# Patient Record
Sex: FEMALE | Race: WHITE | NOT HISPANIC OR LATINO | Employment: FULL TIME | URBAN - METROPOLITAN AREA
[De-identification: names, ages, dates, MRNs, and addresses within clinical notes are randomized per-mention and may not be internally consistent; named-entity substitution may affect disease eponyms.]

---
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
## 2022-03-21 ENCOUNTER — HOSPITAL ENCOUNTER (EMERGENCY)
Facility: HOSPITAL | Age: 22
Discharge: HOME | End: 2022-03-22
Attending: EMERGENCY MEDICINE
Payer: COMMERCIAL

## 2022-03-21 DIAGNOSIS — R25.1 TREMOR: ICD-10-CM

## 2022-03-21 DIAGNOSIS — R55 SYNCOPE, UNSPECIFIED SYNCOPE TYPE: Primary | ICD-10-CM

## 2022-03-21 DIAGNOSIS — R51.9 RIGHT-SIDED HEADACHE: ICD-10-CM

## 2022-03-21 LAB
ALBUMIN SERPL-MCNC: 4.4 G/DL (ref 3.4–5)
ALP SERPL-CCNC: 36 IU/L (ref 35–126)
ALT SERPL-CCNC: 24 IU/L (ref 11–54)
ANION GAP SERPL CALC-SCNC: 8 MEQ/L (ref 3–15)
AST SERPL-CCNC: 20 IU/L (ref 15–41)
B-HCG UR QL: NEGATIVE
BASOPHILS # BLD: 0.03 K/UL (ref 0.01–0.1)
BASOPHILS NFR BLD: 0.5 %
BILIRUB SERPL-MCNC: 0.3 MG/DL (ref 0.3–1.2)
BILIRUB UR QL STRIP.AUTO: NEGATIVE MG/DL
BUN SERPL-MCNC: 10 MG/DL (ref 8–20)
CALCIUM SERPL-MCNC: 9.3 MG/DL (ref 8.9–10.3)
CHLORIDE SERPL-SCNC: 107 MEQ/L (ref 98–109)
CLARITY UR REFRACT.AUTO: CLEAR
CO2 SERPL-SCNC: 23 MEQ/L (ref 22–32)
COLOR UR AUTO: COLORLESS
CREAT SERPL-MCNC: 0.7 MG/DL (ref 0.6–1.1)
DIFFERENTIAL METHOD BLD: NORMAL
EOSINOPHIL # BLD: 0.05 K/UL (ref 0.04–0.36)
EOSINOPHIL NFR BLD: 0.8 %
ERYTHROCYTE [DISTWIDTH] IN BLOOD BY AUTOMATED COUNT: 12.2 % (ref 11.7–14.4)
GFR SERPL CREATININE-BSD FRML MDRD: >60 ML/MIN/1.73M*2
GLUCOSE SERPL-MCNC: 91 MG/DL (ref 70–99)
GLUCOSE UR STRIP.AUTO-MCNC: NEGATIVE MG/DL
HCG UR QL: NEGATIVE
HCT VFR BLDCO AUTO: 42.8 % (ref 35–45)
HGB BLD-MCNC: 14.4 G/DL (ref 11.8–15.7)
HGB UR QL STRIP.AUTO: NEGATIVE
IMM GRANULOCYTES # BLD AUTO: 0.03 K/UL (ref 0–0.08)
IMM GRANULOCYTES NFR BLD AUTO: 0.5 %
KETONES UR STRIP.AUTO-MCNC: NEGATIVE MG/DL
LEUKOCYTE ESTERASE UR QL STRIP.AUTO: NEGATIVE
LYMPHOCYTES # BLD: 1.63 K/UL (ref 1.2–3.5)
LYMPHOCYTES NFR BLD: 24.9 %
MCH RBC QN AUTO: 31.2 PG (ref 28–33.2)
MCHC RBC AUTO-ENTMCNC: 33.6 G/DL (ref 32.2–35.5)
MCV RBC AUTO: 92.6 FL (ref 83–98)
MONOCYTES # BLD: 0.4 K/UL (ref 0.28–0.8)
MONOCYTES NFR BLD: 6.1 %
NEUTROPHILS # BLD: 4.41 K/UL (ref 1.7–7)
NEUTS SEG NFR BLD: 67.2 %
NITRITE UR QL STRIP.AUTO: NEGATIVE
NRBC BLD-RTO: 0 %
PDW BLD AUTO: 9.9 FL (ref 9.4–12.3)
PH UR STRIP.AUTO: 7 [PH]
PLATELET # BLD AUTO: 331 K/UL (ref 150–369)
POTASSIUM SERPL-SCNC: 4 MEQ/L (ref 3.6–5.1)
PROT SERPL-MCNC: 7.7 G/DL (ref 6–8.2)
PROT UR QL STRIP.AUTO: NEGATIVE
RBC # BLD AUTO: 4.62 M/UL (ref 3.93–5.22)
SODIUM SERPL-SCNC: 138 MEQ/L (ref 136–144)
SP GR UR REFRACT.AUTO: 1.01
UROBILINOGEN UR STRIP-ACNC: 0.2 EU/DL
WBC # BLD AUTO: 6.55 K/UL (ref 3.8–10.5)

## 2022-03-21 PROCEDURE — 99284 EMERGENCY DEPT VISIT MOD MDM: CPT | Mod: 25

## 2022-03-21 PROCEDURE — 3E023GC INTRODUCTION OF OTHER THERAPEUTIC SUBSTANCE INTO MUSCLE, PERCUTANEOUS APPROACH: ICD-10-PCS | Performed by: EMERGENCY MEDICINE

## 2022-03-21 PROCEDURE — 25800000 HC PHARMACY IV SOLUTIONS: Performed by: PHYSICIAN ASSISTANT

## 2022-03-21 PROCEDURE — 96375 TX/PRO/DX INJ NEW DRUG ADDON: CPT

## 2022-03-21 PROCEDURE — 63600000 HC DRUGS/DETAIL CODE: Performed by: PHYSICIAN ASSISTANT

## 2022-03-21 PROCEDURE — 96374 THER/PROPH/DIAG INJ IV PUSH: CPT

## 2022-03-21 PROCEDURE — 81003 URINALYSIS AUTO W/O SCOPE: CPT

## 2022-03-21 PROCEDURE — 36415 COLL VENOUS BLD VENIPUNCTURE: CPT

## 2022-03-21 PROCEDURE — 85025 COMPLETE CBC W/AUTO DIFF WBC: CPT | Performed by: EMERGENCY MEDICINE

## 2022-03-21 PROCEDURE — 84703 CHORIONIC GONADOTROPIN ASSAY: CPT | Performed by: EMERGENCY MEDICINE

## 2022-03-21 PROCEDURE — 3E0333Z INTRODUCTION OF ANTI-INFLAMMATORY INTO PERIPHERAL VEIN, PERCUTANEOUS APPROACH: ICD-10-PCS | Performed by: EMERGENCY MEDICINE

## 2022-03-21 PROCEDURE — 96372 THER/PROPH/DIAG INJ SC/IM: CPT | Mod: 59

## 2022-03-21 PROCEDURE — 81025 URINE PREGNANCY TEST: CPT

## 2022-03-21 PROCEDURE — 80053 COMPREHEN METABOLIC PANEL: CPT

## 2022-03-21 PROCEDURE — 3E033GC INTRODUCTION OF OTHER THERAPEUTIC SUBSTANCE INTO PERIPHERAL VEIN, PERCUTANEOUS APPROACH: ICD-10-PCS | Performed by: EMERGENCY MEDICINE

## 2022-03-21 PROCEDURE — 80053 COMPREHEN METABOLIC PANEL: CPT | Performed by: EMERGENCY MEDICINE

## 2022-03-21 PROCEDURE — 3E0337Z INTRODUCTION OF ELECTROLYTIC AND WATER BALANCE SUBSTANCE INTO PERIPHERAL VEIN, PERCUTANEOUS APPROACH: ICD-10-PCS | Performed by: EMERGENCY MEDICINE

## 2022-03-21 PROCEDURE — 93005 ELECTROCARDIOGRAM TRACING: CPT | Performed by: PHYSICIAN ASSISTANT

## 2022-03-21 PROCEDURE — 84703 CHORIONIC GONADOTROPIN ASSAY: CPT

## 2022-03-21 PROCEDURE — 81025 URINE PREGNANCY TEST: CPT | Performed by: EMERGENCY MEDICINE

## 2022-03-21 PROCEDURE — 81003 URINALYSIS AUTO W/O SCOPE: CPT | Performed by: EMERGENCY MEDICINE

## 2022-03-21 PROCEDURE — 85025 COMPLETE CBC W/AUTO DIFF WBC: CPT

## 2022-03-21 PROCEDURE — 96361 HYDRATE IV INFUSION ADD-ON: CPT

## 2022-03-21 RX ORDER — KETOROLAC TROMETHAMINE 15 MG/ML
15 INJECTION, SOLUTION INTRAMUSCULAR; INTRAVENOUS ONCE
Status: COMPLETED | OUTPATIENT
Start: 2022-03-21 | End: 2022-03-22

## 2022-03-21 RX ORDER — DIPHENHYDRAMINE HCL 50 MG/ML
25 VIAL (ML) INJECTION ONCE
Status: COMPLETED | OUTPATIENT
Start: 2022-03-21 | End: 2022-03-21

## 2022-03-21 RX ORDER — METOCLOPRAMIDE HYDROCHLORIDE 5 MG/ML
10 INJECTION INTRAMUSCULAR; INTRAVENOUS ONCE
Status: COMPLETED | OUTPATIENT
Start: 2022-03-21 | End: 2022-03-21

## 2022-03-21 RX ORDER — ATENOLOL 25 MG/1
25 TABLET ORAL DAILY
COMMUNITY
End: 2022-11-18

## 2022-03-21 RX ORDER — SUMATRIPTAN 6 MG/.5ML
6 INJECTION, SOLUTION SUBCUTANEOUS ONCE
Status: COMPLETED | OUTPATIENT
Start: 2022-03-21 | End: 2022-03-21

## 2022-03-21 RX ADMIN — SODIUM CHLORIDE 1000 ML: 9 INJECTION, SOLUTION INTRAVENOUS at 21:49

## 2022-03-21 RX ADMIN — SUMATRIPTAN 6 MG: 6 INJECTION, SOLUTION SUBCUTANEOUS at 21:55

## 2022-03-21 RX ADMIN — METOCLOPRAMIDE 10 MG: 5 INJECTION, SOLUTION INTRAMUSCULAR; INTRAVENOUS at 21:49

## 2022-03-21 RX ADMIN — DIPHENHYDRAMINE HYDROCHLORIDE 25 MG: 50 INJECTION, SOLUTION INTRAMUSCULAR; INTRAVENOUS at 21:53

## 2022-03-21 ASSESSMENT — ENCOUNTER SYMPTOMS
TREMORS: 1
DYSURIA: 0
COUGH: 0
ARTHRALGIAS: 0
EYE PAIN: 0
HEMATURIA: 0
DIZZINESS: 1
PALPITATIONS: 0
VOMITING: 0
ABDOMINAL PAIN: 0
NECK PAIN: 1
FEVER: 0
HEADACHES: 1
SEIZURES: 0
BACK PAIN: 0
SORE THROAT: 0
COLOR CHANGE: 0
SHORTNESS OF BREATH: 0
CHILLS: 0

## 2022-03-22 ENCOUNTER — TELEPHONE (OUTPATIENT)
Dept: FAMILY MEDICINE | Facility: CLINIC | Age: 22
End: 2022-03-22
Payer: COMMERCIAL

## 2022-03-22 VITALS
TEMPERATURE: 98.5 F | DIASTOLIC BLOOD PRESSURE: 75 MMHG | WEIGHT: 140 LBS | OXYGEN SATURATION: 99 % | HEART RATE: 82 BPM | SYSTOLIC BLOOD PRESSURE: 114 MMHG | HEIGHT: 64 IN | RESPIRATION RATE: 20 BRPM | BODY MASS INDEX: 23.9 KG/M2

## 2022-03-22 LAB
ATRIAL RATE: 78
P AXIS: 71
PR INTERVAL: 162
QRS DURATION: 76
QT INTERVAL: 374
QTC CALCULATION(BAZETT): 426
R AXIS: 86
T WAVE AXIS: 53
VENTRICULAR RATE: 78

## 2022-03-22 PROCEDURE — 63600000 HC DRUGS/DETAIL CODE: Performed by: PHYSICIAN ASSISTANT

## 2022-03-22 RX ADMIN — KETOROLAC TROMETHAMINE 15 MG: 15 INJECTION, SOLUTION INTRAMUSCULAR; INTRAVENOUS at 00:09

## 2022-03-22 NOTE — ED ATTESTATION NOTE
I reviewed and agree with physician assistant / nurse practitioner’s assessment and plan of care.  We discussed the treatment plan for the patient.  I was immediately available for any questions regarding the care of the patient.    We are in a pandemic with increased volumes and decreased capacity.      Héctor Mantilla MD  03/22/22 1958

## 2022-03-22 NOTE — DISCHARGE INSTRUCTIONS
Please call your primary care doctor, neurologist, and cardiologist today to inform them of your ER visit today and arrange a follow up appointment within 2 days. Please return immediately to the emergency department for any new/worsening or concerning symptoms.  We offered to complete a noncontrast CT of your head, but you declined at this time.  Please follow-up with your neurologist to discuss possibility of advanced imaging and to discuss your tremors.
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
## 2022-03-25 ENCOUNTER — HOSPITAL ENCOUNTER (EMERGENCY)
Facility: HOSPITAL | Age: 22
Discharge: HOME | End: 2022-03-25
Attending: EMERGENCY MEDICINE
Payer: COMMERCIAL

## 2022-03-25 ENCOUNTER — APPOINTMENT (EMERGENCY)
Dept: RADIOLOGY | Facility: HOSPITAL | Age: 22
End: 2022-03-25
Attending: EMERGENCY MEDICINE
Payer: COMMERCIAL

## 2022-03-25 ENCOUNTER — TELEPHONE (OUTPATIENT)
Dept: FAMILY MEDICINE | Facility: CLINIC | Age: 22
End: 2022-03-25

## 2022-03-25 VITALS
DIASTOLIC BLOOD PRESSURE: 84 MMHG | HEART RATE: 96 BPM | SYSTOLIC BLOOD PRESSURE: 116 MMHG | RESPIRATION RATE: 16 BRPM | OXYGEN SATURATION: 99 % | TEMPERATURE: 98 F

## 2022-03-25 DIAGNOSIS — N39.0 URINARY TRACT INFECTION IN FEMALE: Primary | ICD-10-CM

## 2022-03-25 DIAGNOSIS — R55 SYNCOPE, UNSPECIFIED SYNCOPE TYPE: ICD-10-CM

## 2022-03-25 DIAGNOSIS — R51.9 NONINTRACTABLE HEADACHE, UNSPECIFIED CHRONICITY PATTERN, UNSPECIFIED HEADACHE TYPE: ICD-10-CM

## 2022-03-25 DIAGNOSIS — Z86.69 HISTORY OF TREMOR: ICD-10-CM

## 2022-03-25 LAB
ALBUMIN SERPL-MCNC: 4.5 G/DL (ref 3.4–5)
ALP SERPL-CCNC: 42 IU/L (ref 35–126)
ALT SERPL-CCNC: 19 IU/L (ref 11–54)
ANION GAP SERPL CALC-SCNC: 11 MEQ/L (ref 3–15)
AST SERPL-CCNC: 18 IU/L (ref 15–41)
BACTERIA URNS QL MICRO: 2 /HPF
BASOPHILS # BLD: 0.03 K/UL (ref 0.01–0.1)
BASOPHILS NFR BLD: 0.4 %
BILIRUB SERPL-MCNC: 0.6 MG/DL (ref 0.3–1.2)
BILIRUB UR QL STRIP.AUTO: NEGATIVE MG/DL
BUN SERPL-MCNC: 18 MG/DL (ref 8–20)
CALCIUM SERPL-MCNC: 9.5 MG/DL (ref 8.9–10.3)
CHLORIDE SERPL-SCNC: 103 MEQ/L (ref 98–109)
CLARITY UR REFRACT.AUTO: ABNORMAL
CO2 SERPL-SCNC: 23 MEQ/L (ref 22–32)
COLOR UR AUTO: YELLOW
CREAT SERPL-MCNC: 1.1 MG/DL (ref 0.6–1.1)
DIFFERENTIAL METHOD BLD: ABNORMAL
EOSINOPHIL # BLD: 0.03 K/UL (ref 0.04–0.36)
EOSINOPHIL NFR BLD: 0.4 %
ERYTHROCYTE [DISTWIDTH] IN BLOOD BY AUTOMATED COUNT: 12.2 % (ref 11.7–14.4)
GFR SERPL CREATININE-BSD FRML MDRD: >60 ML/MIN/1.73M*2
GLUCOSE SERPL-MCNC: 66 MG/DL (ref 70–99)
GLUCOSE UR STRIP.AUTO-MCNC: NEGATIVE MG/DL
HCG UR QL: NEGATIVE
HCT VFR BLDCO AUTO: 44.6 % (ref 35–45)
HGB BLD-MCNC: 14.9 G/DL (ref 11.8–15.7)
HGB UR QL STRIP.AUTO: NEGATIVE
HYALINE CASTS #/AREA URNS LPF: ABNORMAL /LPF
IMM GRANULOCYTES # BLD AUTO: 0.02 K/UL (ref 0–0.08)
IMM GRANULOCYTES NFR BLD AUTO: 0.3 %
KETONES UR STRIP.AUTO-MCNC: NEGATIVE MG/DL
LEUKOCYTE ESTERASE UR QL STRIP.AUTO: 2
LIPASE SERPL-CCNC: 36 U/L (ref 20–51)
LYMPHOCYTES # BLD: 1.19 K/UL (ref 1.2–3.5)
LYMPHOCYTES NFR BLD: 17.8 %
MCH RBC QN AUTO: 31.1 PG (ref 28–33.2)
MCHC RBC AUTO-ENTMCNC: 33.4 G/DL (ref 32.2–35.5)
MCV RBC AUTO: 93.1 FL (ref 83–98)
MONOCYTES # BLD: 0.6 K/UL (ref 0.28–0.8)
MONOCYTES NFR BLD: 9 %
MUCOUS THREADS URNS QL MICRO: ABNORMAL /LPF
NEUTROPHILS # BLD: 4.83 K/UL (ref 1.7–7)
NEUTS SEG NFR BLD: 72.1 %
NITRITE UR QL STRIP.AUTO: NEGATIVE
NRBC BLD-RTO: 0 %
PDW BLD AUTO: 9.8 FL (ref 9.4–12.3)
PH UR STRIP.AUTO: 6.5 [PH]
PLATELET # BLD AUTO: 306 K/UL (ref 150–369)
POTASSIUM SERPL-SCNC: 4.1 MEQ/L (ref 3.6–5.1)
PROT SERPL-MCNC: 8.3 G/DL (ref 6–8.2)
PROT UR QL STRIP.AUTO: 2
RBC # BLD AUTO: 4.79 M/UL (ref 3.93–5.22)
RBC #/AREA URNS HPF: ABNORMAL /HPF
SODIUM SERPL-SCNC: 137 MEQ/L (ref 136–144)
SP GR UR REFRACT.AUTO: 1.01
SQUAMOUS URNS QL MICRO: 1 /HPF
UROBILINOGEN UR STRIP-ACNC: 0.2 EU/DL
WBC # BLD AUTO: 6.7 K/UL (ref 3.8–10.5)
WBC #/AREA URNS HPF: ABNORMAL /HPF

## 2022-03-25 PROCEDURE — 96374 THER/PROPH/DIAG INJ IV PUSH: CPT | Mod: 59

## 2022-03-25 PROCEDURE — 96375 TX/PRO/DX INJ NEW DRUG ADDON: CPT

## 2022-03-25 PROCEDURE — 87086 URINE CULTURE/COLONY COUNT: CPT | Performed by: PHYSICIAN ASSISTANT

## 2022-03-25 PROCEDURE — 83690 ASSAY OF LIPASE: CPT | Performed by: PHYSICIAN ASSISTANT

## 2022-03-25 PROCEDURE — 99284 EMERGENCY DEPT VISIT MOD MDM: CPT | Mod: 25

## 2022-03-25 PROCEDURE — 36415 COLL VENOUS BLD VENIPUNCTURE: CPT | Performed by: EMERGENCY MEDICINE

## 2022-03-25 PROCEDURE — 25000000 HC PHARMACY GENERAL

## 2022-03-25 PROCEDURE — 93005 ELECTROCARDIOGRAM TRACING: CPT | Performed by: EMERGENCY MEDICINE

## 2022-03-25 PROCEDURE — 80053 COMPREHEN METABOLIC PANEL: CPT | Performed by: EMERGENCY MEDICINE

## 2022-03-25 PROCEDURE — 84703 CHORIONIC GONADOTROPIN ASSAY: CPT | Performed by: EMERGENCY MEDICINE

## 2022-03-25 PROCEDURE — 63600000 HC DRUGS/DETAIL CODE: Performed by: PHYSICIAN ASSISTANT

## 2022-03-25 PROCEDURE — 96376 TX/PRO/DX INJ SAME DRUG ADON: CPT

## 2022-03-25 PROCEDURE — 3E0337Z INTRODUCTION OF ELECTROLYTIC AND WATER BALANCE SUBSTANCE INTO PERIPHERAL VEIN, PERCUTANEOUS APPROACH: ICD-10-PCS | Performed by: EMERGENCY MEDICINE

## 2022-03-25 PROCEDURE — 3E033GC INTRODUCTION OF OTHER THERAPEUTIC SUBSTANCE INTO PERIPHERAL VEIN, PERCUTANEOUS APPROACH: ICD-10-PCS | Performed by: EMERGENCY MEDICINE

## 2022-03-25 PROCEDURE — 93005 ELECTROCARDIOGRAM TRACING: CPT

## 2022-03-25 PROCEDURE — 63600105 HC IODINE BASED CONTRAST: Performed by: PHYSICIAN ASSISTANT

## 2022-03-25 PROCEDURE — 25800000 HC PHARMACY IV SOLUTIONS: Performed by: PHYSICIAN ASSISTANT

## 2022-03-25 PROCEDURE — 96361 HYDRATE IV INFUSION ADD-ON: CPT | Mod: 59

## 2022-03-25 PROCEDURE — 63700000 HC SELF-ADMINISTRABLE DRUG: Performed by: PHYSICIAN ASSISTANT

## 2022-03-25 PROCEDURE — 85025 COMPLETE CBC W/AUTO DIFF WBC: CPT | Performed by: EMERGENCY MEDICINE

## 2022-03-25 PROCEDURE — G1004 CDSM NDSC: HCPCS

## 2022-03-25 PROCEDURE — 81003 URINALYSIS AUTO W/O SCOPE: CPT | Performed by: PHYSICIAN ASSISTANT

## 2022-03-25 RX ORDER — DIPHENHYDRAMINE HCL 50 MG/ML
25 VIAL (ML) INJECTION ONCE
Status: COMPLETED | OUTPATIENT
Start: 2022-03-25 | End: 2022-03-25

## 2022-03-25 RX ORDER — CEFUROXIME AXETIL 500 MG/1
500 TABLET ORAL 2 TIMES DAILY
Qty: 14 TABLET | Refills: 0 | Status: SHIPPED | OUTPATIENT
Start: 2022-03-25 | End: 2022-04-01

## 2022-03-25 RX ORDER — DIPHENHYDRAMINE HCL 50 MG/ML
VIAL (ML) INJECTION
Status: DISCONTINUED
Start: 2022-03-25 | End: 2022-03-25 | Stop reason: HOSPADM

## 2022-03-25 RX ORDER — FAMOTIDINE 10 MG/ML
INJECTION INTRAVENOUS
Status: COMPLETED
Start: 2022-03-25 | End: 2022-03-25

## 2022-03-25 RX ORDER — METOCLOPRAMIDE HYDROCHLORIDE 5 MG/ML
10 INJECTION INTRAMUSCULAR; INTRAVENOUS ONCE
Status: COMPLETED | OUTPATIENT
Start: 2022-03-25 | End: 2022-03-25

## 2022-03-25 RX ORDER — CEFUROXIME AXETIL 250 MG/1
500 TABLET ORAL EVERY 12 HOURS
Status: DISCONTINUED | OUTPATIENT
Start: 2022-03-25 | End: 2022-03-25 | Stop reason: HOSPADM

## 2022-03-25 RX ORDER — FLUDROCORTISONE ACETATE 0.1 MG/1
TABLET ORAL
COMMUNITY
Start: 2022-03-04 | End: 2022-11-18

## 2022-03-25 RX ORDER — CEFUROXIME AXETIL 250 MG/1
TABLET ORAL
Status: DISCONTINUED
Start: 2022-03-25 | End: 2022-03-25 | Stop reason: HOSPADM

## 2022-03-25 RX ORDER — FAMOTIDINE 10 MG/ML
20 INJECTION INTRAVENOUS ONCE
Status: COMPLETED | OUTPATIENT
Start: 2022-03-25 | End: 2022-03-25

## 2022-03-25 RX ADMIN — DIPHENHYDRAMINE HYDROCHLORIDE 25 MG: 50 INJECTION INTRAMUSCULAR; INTRAVENOUS at 19:37

## 2022-03-25 RX ADMIN — METOCLOPRAMIDE HYDROCHLORIDE 10 MG: 5 INJECTION INTRAMUSCULAR; INTRAVENOUS at 16:28

## 2022-03-25 RX ADMIN — FAMOTIDINE 20 MG: 10 INJECTION INTRAVENOUS at 19:37

## 2022-03-25 RX ADMIN — DIPHENHYDRAMINE HYDROCHLORIDE 25 MG: 50 INJECTION INTRAMUSCULAR; INTRAVENOUS at 16:28

## 2022-03-25 RX ADMIN — IOHEXOL 120 ML: 350 INJECTION, SOLUTION INTRAVENOUS at 18:59

## 2022-03-25 RX ADMIN — SODIUM CHLORIDE 1000 ML: 9 INJECTION, SOLUTION INTRAVENOUS at 16:28

## 2022-03-25 RX ADMIN — CEFUROXIME AXETIL 500 MG: 250 TABLET, FILM COATED ORAL at 21:07

## 2022-03-25 NOTE — DISCHARGE INSTRUCTIONS
Your symptoms need further evaluation by your doctor and your neurologist.  Please call to arrange outpatient follow-up of you have worsening symptoms or concerns return to the ER for reevaluation
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
## 2022-03-26 LAB
ATRIAL RATE: 92
P AXIS: 45
PR INTERVAL: 140
QRS DURATION: 66
QT INTERVAL: 332
QTC CALCULATION(BAZETT): 410
R AXIS: 82
T WAVE AXIS: 42
VENTRICULAR RATE: 92

## 2022-03-26 ASSESSMENT — ENCOUNTER SYMPTOMS
CHILLS: 0
CHEST TIGHTNESS: 0
NUMBNESS: 0
HEADACHES: 1
WEAKNESS: 0
TREMORS: 1
SHORTNESS OF BREATH: 0
VOMITING: 0
NAUSEA: 0
FEVER: 0

## 2022-03-27 LAB
BACTERIA UR CULT: NORMAL
BACTERIA UR CULT: NORMAL

## 2022-03-29 ENCOUNTER — DOCUMENTATION (OUTPATIENT)
Dept: FAMILY MEDICINE | Facility: CLINIC | Age: 22
End: 2022-03-29
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
## 2022-11-03 ENCOUNTER — APPOINTMENT (EMERGENCY)
Dept: RADIOLOGY | Facility: HOSPITAL | Age: 22
End: 2022-11-03
Attending: STUDENT IN AN ORGANIZED HEALTH CARE EDUCATION/TRAINING PROGRAM
Payer: COMMERCIAL

## 2022-11-03 ENCOUNTER — HOSPITAL ENCOUNTER (EMERGENCY)
Facility: HOSPITAL | Age: 22
Discharge: HOME | End: 2022-11-03
Attending: EMERGENCY MEDICINE | Admitting: EMERGENCY MEDICINE
Payer: COMMERCIAL

## 2022-11-03 ENCOUNTER — OFFICE VISIT (OUTPATIENT)
Dept: FAMILY MEDICINE | Facility: CLINIC | Age: 22
End: 2022-11-03
Payer: COMMERCIAL

## 2022-11-03 VITALS
SYSTOLIC BLOOD PRESSURE: 118 MMHG | BODY MASS INDEX: 22.2 KG/M2 | WEIGHT: 130 LBS | RESPIRATION RATE: 14 BRPM | HEART RATE: 80 BPM | HEIGHT: 64 IN | TEMPERATURE: 98 F | OXYGEN SATURATION: 100 % | DIASTOLIC BLOOD PRESSURE: 63 MMHG

## 2022-11-03 VITALS — DIASTOLIC BLOOD PRESSURE: 70 MMHG | HEART RATE: 88 BPM | SYSTOLIC BLOOD PRESSURE: 90 MMHG | OXYGEN SATURATION: 98 %

## 2022-11-03 DIAGNOSIS — A18.01 POTT'S DISEASE: ICD-10-CM

## 2022-11-03 DIAGNOSIS — R55 SYNCOPE AND COLLAPSE: Primary | ICD-10-CM

## 2022-11-03 DIAGNOSIS — R10.9 ABDOMINAL PAIN, UNSPECIFIED ABDOMINAL LOCATION: ICD-10-CM

## 2022-11-03 DIAGNOSIS — R10.31 RIGHT LOWER QUADRANT ABDOMINAL PAIN: Primary | ICD-10-CM

## 2022-11-03 LAB
ALBUMIN SERPL-MCNC: 4 G/DL (ref 3.4–5)
ALP SERPL-CCNC: 36 IU/L (ref 35–126)
ALT SERPL-CCNC: 15 IU/L (ref 11–54)
ANION GAP SERPL CALC-SCNC: 7 MEQ/L (ref 3–15)
AST SERPL-CCNC: 17 IU/L (ref 15–41)
BASOPHILS # BLD: 0.03 K/UL (ref 0.01–0.1)
BASOPHILS NFR BLD: 0.5 %
BILIRUB SERPL-MCNC: 0.3 MG/DL (ref 0.3–1.2)
BILIRUB UR QL STRIP.AUTO: NEGATIVE MG/DL
BUN SERPL-MCNC: 14 MG/DL (ref 8–20)
CALCIUM SERPL-MCNC: 9.1 MG/DL (ref 8.9–10.3)
CHLORIDE SERPL-SCNC: 105 MEQ/L (ref 98–109)
CLARITY UR REFRACT.AUTO: CLEAR
CO2 SERPL-SCNC: 24 MEQ/L (ref 22–32)
COLOR UR AUTO: YELLOW
CREAT SERPL-MCNC: 0.7 MG/DL (ref 0.6–1.1)
DIFFERENTIAL METHOD BLD: ABNORMAL
EOSINOPHIL # BLD: 0.04 K/UL (ref 0.04–0.36)
EOSINOPHIL NFR BLD: 0.6 %
ERYTHROCYTE [DISTWIDTH] IN BLOOD BY AUTOMATED COUNT: 12.2 % (ref 11.7–14.4)
GFR SERPL CREATININE-BSD FRML MDRD: >60 ML/MIN/1.73M*2
GLUCOSE BLD-MCNC: 84 MG/DL (ref 70–99)
GLUCOSE SERPL-MCNC: 77 MG/DL (ref 70–99)
GLUCOSE UR STRIP.AUTO-MCNC: NEGATIVE MG/DL
HCG UR QL: NEGATIVE
HCT VFR BLDCO AUTO: 41.5 % (ref 35–45)
HGB BLD-MCNC: 13.3 G/DL (ref 11.8–15.7)
HGB UR QL STRIP.AUTO: NEGATIVE
IMM GRANULOCYTES # BLD AUTO: 0.04 K/UL (ref 0–0.08)
IMM GRANULOCYTES NFR BLD AUTO: 0.6 %
KETONES UR STRIP.AUTO-MCNC: NEGATIVE MG/DL
LACTATE SERPL-SCNC: 1 MMOL/L (ref 0.4–2)
LEUKOCYTE ESTERASE UR QL STRIP.AUTO: NEGATIVE
LYMPHOCYTES # BLD: 1.57 K/UL (ref 1.2–3.5)
LYMPHOCYTES NFR BLD: 23.8 %
MCH RBC QN AUTO: 30.9 PG (ref 28–33.2)
MCHC RBC AUTO-ENTMCNC: 32 G/DL (ref 32.2–35.5)
MCV RBC AUTO: 96.5 FL (ref 83–98)
MONOCYTES # BLD: 0.44 K/UL (ref 0.28–0.8)
MONOCYTES NFR BLD: 6.7 %
NEUTROPHILS # BLD: 4.49 K/UL (ref 1.7–7)
NEUTS SEG NFR BLD: 67.8 %
NITRITE UR QL STRIP.AUTO: NEGATIVE
NRBC BLD-RTO: 0 %
PDW BLD AUTO: 9.6 FL (ref 9.4–12.3)
PH UR STRIP.AUTO: 6.5 [PH]
PLATELET # BLD AUTO: 305 K/UL (ref 150–369)
POCT TEST: NORMAL
POTASSIUM SERPL-SCNC: 3.8 MEQ/L (ref 3.6–5.1)
PROT SERPL-MCNC: 7.6 G/DL (ref 6–8.2)
PROT UR QL STRIP.AUTO: NEGATIVE
RBC # BLD AUTO: 4.3 M/UL (ref 3.93–5.22)
SODIUM SERPL-SCNC: 136 MEQ/L (ref 136–144)
SP GR UR REFRACT.AUTO: 1.01
TROPONIN I SERPL HS-MCNC: <2 PG/ML
UROBILINOGEN UR STRIP-ACNC: 0.2 EU/DL
WBC # BLD AUTO: 6.61 K/UL (ref 3.8–10.5)

## 2022-11-03 PROCEDURE — 81003 URINALYSIS AUTO W/O SCOPE: CPT | Performed by: STUDENT IN AN ORGANIZED HEALTH CARE EDUCATION/TRAINING PROGRAM

## 2022-11-03 PROCEDURE — 93005 ELECTROCARDIOGRAM TRACING: CPT | Performed by: EMERGENCY MEDICINE

## 2022-11-03 PROCEDURE — 3E033NZ INTRODUCTION OF ANALGESICS, HYPNOTICS, SEDATIVES INTO PERIPHERAL VEIN, PERCUTANEOUS APPROACH: ICD-10-PCS | Performed by: EMERGENCY MEDICINE

## 2022-11-03 PROCEDURE — 76857 US EXAM PELVIC LIMITED: CPT | Mod: 59

## 2022-11-03 PROCEDURE — 83605 ASSAY OF LACTIC ACID: CPT

## 2022-11-03 PROCEDURE — 84484 ASSAY OF TROPONIN QUANT: CPT

## 2022-11-03 PROCEDURE — 84484 ASSAY OF TROPONIN QUANT: CPT | Performed by: EMERGENCY MEDICINE

## 2022-11-03 PROCEDURE — 83605 ASSAY OF LACTIC ACID: CPT | Performed by: EMERGENCY MEDICINE

## 2022-11-03 PROCEDURE — 84703 CHORIONIC GONADOTROPIN ASSAY: CPT | Performed by: EMERGENCY MEDICINE

## 2022-11-03 PROCEDURE — 93005 ELECTROCARDIOGRAM TRACING: CPT

## 2022-11-03 PROCEDURE — 99284 EMERGENCY DEPT VISIT MOD MDM: CPT | Mod: 25

## 2022-11-03 PROCEDURE — 76775 US EXAM ABDO BACK WALL LIM: CPT

## 2022-11-03 PROCEDURE — 76830 TRANSVAGINAL US NON-OB: CPT

## 2022-11-03 PROCEDURE — 63600000 HC DRUGS/DETAIL CODE: Performed by: EMERGENCY MEDICINE

## 2022-11-03 PROCEDURE — 85025 COMPLETE CBC W/AUTO DIFF WBC: CPT

## 2022-11-03 PROCEDURE — 84075 ASSAY ALKALINE PHOSPHATASE: CPT

## 2022-11-03 PROCEDURE — 85025 COMPLETE CBC W/AUTO DIFF WBC: CPT | Performed by: EMERGENCY MEDICINE

## 2022-11-03 PROCEDURE — 200200 PR NO CHARGE: Performed by: NURSE PRACTITIONER

## 2022-11-03 PROCEDURE — 84703 CHORIONIC GONADOTROPIN ASSAY: CPT

## 2022-11-03 PROCEDURE — 80053 COMPREHEN METABOLIC PANEL: CPT | Performed by: EMERGENCY MEDICINE

## 2022-11-03 PROCEDURE — 36415 COLL VENOUS BLD VENIPUNCTURE: CPT

## 2022-11-03 PROCEDURE — 96374 THER/PROPH/DIAG INJ IV PUSH: CPT

## 2022-11-03 RX ORDER — KETOROLAC TROMETHAMINE 15 MG/ML
15 INJECTION, SOLUTION INTRAMUSCULAR; INTRAVENOUS ONCE
Status: COMPLETED | OUTPATIENT
Start: 2022-11-03 | End: 2022-11-03

## 2022-11-03 RX ORDER — NAPROXEN 500 MG/1
500 TABLET ORAL 2 TIMES DAILY WITH MEALS
Qty: 60 TABLET | Refills: 0 | Status: SHIPPED | OUTPATIENT
Start: 2022-11-03 | End: 2023-10-05

## 2022-11-03 RX ADMIN — KETOROLAC TROMETHAMINE 15 MG: 15 INJECTION, SOLUTION INTRAMUSCULAR; INTRAVENOUS at 16:19

## 2022-11-03 ASSESSMENT — ENCOUNTER SYMPTOMS
FEVER: 0
ABDOMINAL PAIN: 1
ARTHRALGIAS: 0
PALPITATIONS: 0
SHORTNESS OF BREATH: 0
BACK PAIN: 0
SORE THROAT: 0
DYSURIA: 1
FREQUENCY: 1
COUGH: 0
VOMITING: 0
COLOR CHANGE: 0
CHILLS: 0
EYE PAIN: 0
HEMATURIA: 0
SEIZURES: 0
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
## 2022-11-03 NOTE — ED PROVIDER NOTES
Emergency Medicine Note  HPI   HISTORY OF PRESENT ILLNESS     22-year-old female, past medical history of appendectomy, POTS, anxiety, pseudoseizure who presents with abdominal pain.  Patient reports she has had 1 day of right lower quadrant abdominal pain.  Reports pain to be sharp and radiating to her back.  Patient does report she has a history of a ovarian cyst.  Orts that she went to the doctor to be evaluated today and while she was there she had a loss of consciousness.  No head strike or blood thinner use.  Witnessed by mother.  No seizure-like activity.,  Return to normal mental status fairly quickly.  Patient does report dysuria, frequency.  Patient denies vaginal bleeding.  Reports nausea without vomiting.  No chest pain, shortness of breath or additional symptoms.            Patient History   PAST HISTORY     Reviewed from Nursing Triage:       Past Medical History:   Diagnosis Date    Anxiety     Concussion     Hypoglycemia     POP (persistent occipitoposterior position)     POTS (postural orthostatic tachycardia syndrome)        Past Surgical History:   Procedure Laterality Date    APPENDECTOMY      SKIN BIOPSY         Family History   Problem Relation Age of Onset    Diabetes Biological Mother     Bipolar disorder Biological Father     JOSE RAUL disease Biological Father     No Known Problems Biological Sister         gastroparesis unknown     Kidney disease Maternal Grandmother     Hyperlipidemia Maternal Grandmother     Diabetes Maternal Grandfather     Heart disease Maternal Grandfather     Lupus Paternal Grandmother     Thyroid disease Paternal Grandmother     Hydrocephalus Paternal Grandmother     Bipolar disorder Paternal Grandmother     Atrial fibrillation Paternal Grandfather        Social History     Tobacco Use    Smoking status: Never    Smokeless tobacco: Never   Vaping Use    Vaping Use: Never used   Substance Use Topics    Alcohol use: Never    Drug use: Never          Review of Systems   REVIEW OF SYSTEMS     Review of Systems   Constitutional: Negative for chills and fever.   HENT: Negative for ear pain and sore throat.    Eyes: Negative for pain and visual disturbance.   Respiratory: Negative for cough and shortness of breath.    Cardiovascular: Negative for chest pain and palpitations.   Gastrointestinal: Positive for abdominal pain. Negative for vomiting.   Genitourinary: Positive for dysuria and frequency. Negative for hematuria.   Musculoskeletal: Negative for arthralgias and back pain.   Skin: Negative for color change and rash.   Neurological: Positive for syncope. Negative for seizures.   All other systems reviewed and are negative.        VITALS     ED Vitals    Date/Time Temp Pulse Resp BP SpO2 [facility]   11/03/22 1839 36.7 °C (98 °F) 80 14 118/63 100 % KLM   11/03/22 1520 -- 92 15 116/72 100 % KLM   11/03/22 1407 36.2 °C (97.2 °F) 99 17 115/75 100 % KLM                       Physical Exam   PHYSICAL EXAM     Physical Exam  Vitals and nursing note reviewed.   Constitutional:       General: She is not in acute distress.     Appearance: She is well-developed.   HENT:      Head: Normocephalic and atraumatic.      Right Ear: External ear normal.      Left Ear: External ear normal.      Mouth/Throat:      Pharynx: Oropharynx is clear.   Eyes:      Conjunctiva/sclera: Conjunctivae normal.   Cardiovascular:      Rate and Rhythm: Normal rate and regular rhythm.      Heart sounds: No murmur heard.  Pulmonary:      Effort: Pulmonary effort is normal. No respiratory distress.      Breath sounds: Normal breath sounds.   Abdominal:      Palpations: Abdomen is soft.      Tenderness: There is abdominal tenderness.      Comments: Tenderness to palpation in right pelvic area   Musculoskeletal:      Cervical back: Neck supple.      Right lower leg: No edema.      Left lower leg: No edema.   Skin:     General: Skin is warm and dry.   Neurological:      General: No focal deficit present.       Mental Status: She is alert and oriented to person, place, and time.      Cranial Nerves: No cranial nerve deficit.      Sensory: No sensory deficit.      Motor: No weakness.      Gait: Gait normal.   Psychiatric:         Mood and Affect: Mood normal.         Behavior: Behavior normal.         Thought Content: Thought content normal.         Judgment: Judgment normal.           PROCEDURES     Procedures     DATA     Results     Procedure Component Value Units Date/Time    Urinalysis with Reflex Culture [[id number]]  (Normal) Collected: 11/03/22 1533    Specimen: Urine, Clean Catch Updated: 11/03/22 1541    Narrative:      The following orders were created for panel order Urinalysis with Reflex Culture.  Procedure                               Abnormality         Status                     ---------                               -----------         ------                     UA Reflex to Culture (Ma...[[id number]]  Normal              Final result                 Please view results for these tests on the individual orders.    UA Reflex to Culture (Macroscopic) [[id number]]  (Normal) Collected: 11/03/22 1533    Specimen: Urine, Clean Catch Updated: 11/03/22 1541     Color, Urine Yellow     Clarity, Urine Clear     Specific Gravity, Urine 1.014     pH, Urine 6.5     Leukocyte Esterase Negative     Comment: Results can be falsely negative due to high specific gravity, some antibiotics, glucose >3 g/dl, or WBC other than neutrophils.        Nitrite, Urine Negative     Protein, Urine Negative     Glucose, Urine Negative mg/dL      Ketones, Urine Negative mg/dL      Urobilinogen, Urine 0.2 EU/dL      Bilirubin, Urine Negative mg/dL      Blood, Urine Negative     Comment: The sensitivity of the occult blood test is equivalent to approximately 4 intact RBC/HPF.       HS Troponin I (with 2 hour reflex) [[id number]]  (Normal) Collected: 11/03/22 1413    Specimen: Blood, Venous Updated: 11/03/22 1500     High Sens Troponin I <2.0  pg/mL     Comprehensive metabolic panel [[id number]]  (Normal) Collected: 11/03/22 1413    Specimen: Blood, Venous Updated: 11/03/22 1449     Sodium 136 mEQ/L      Potassium 3.8 mEQ/L      Comment: Results obtained on plasma. Plasma Potassium values may be up to 0.4 mEQ/L less than serum values. The differences may be greater for patients with high platelet or white cell counts.        Chloride 105 mEQ/L      CO2 24 mEQ/L      BUN 14 mg/dL      Creatinine 0.7 mg/dL      Glucose 77 mg/dL      Calcium 9.1 mg/dL      AST (SGOT) 17 IU/L      ALT (SGPT) 15 IU/L      Alkaline Phosphatase 36 IU/L      Total Protein 7.6 g/dL      Comment: Test performed on plasma which typically contains approximately 0.4 g/dL more protein than serum.        Albumin 4.0 g/dL      Bilirubin, Total 0.3 mg/dL      eGFR >60.0 mL/min/1.73m*2      Anion Gap 7 mEQ/L     hCG, serum, qualitative [[id number]]  (Normal) Collected: 11/03/22 1413    Specimen: Blood, Venous Updated: 11/03/22 1441     Preg Test, Serum Negative    CBC and differential [[id number]]  (Abnormal) Collected: 11/03/22 1413    Specimen: Blood, Venous Updated: 11/03/22 1433     WBC 6.61 K/uL      RBC 4.30 M/uL      Hemoglobin 13.3 g/dL      Hematocrit 41.5 %      MCV 96.5 fL      MCH 30.9 pg      MCHC 32.0 g/dL      RDW 12.2 %      Platelets 305 K/uL      MPV 9.6 fL      Differential Type Auto     nRBC 0.0 %      Immature Granulocytes 0.6 %      Neutrophils 67.8 %      Lymphocytes 23.8 %      Monocytes 6.7 %      Eosinophils 0.6 %      Basophils 0.5 %      Immature Granulocytes, Absolute 0.04 K/uL      Neutrophils, Absolute 4.49 K/uL      Lymphocytes, Absolute 1.57 K/uL      Monocytes, Absolute 0.44 K/uL      Eosinophils, Absolute 0.04 K/uL      Basophils, Absolute 0.03 K/uL     Lactate, w/ reflex repeat if > 2.0 [[id number]]  (Normal) Collected: 11/03/22 1413    Specimen: Blood, Venous Updated: 11/03/22 1425     Lactate 1.0 mmol/L     RAINBOW LT BLUE [[id number]] Collected: 11/03/22  1413    Specimen: Blood, Venous Updated: 11/03/22 1423    Reese Draw Panel [[id number]] Collected: 11/03/22 1413    Specimen: Blood, Venous Updated: 11/03/22 1423    Narrative:      The following orders were created for panel order Reese Draw Panel.  Procedure                               Abnormality         Status                     ---------                               -----------         ------                     RAINBOW RED[[id number]]                                      In process                 RAINBOW LT BLUE[[id number]]                                  In process                 RAINBOW GOLD[[id number]]                                                                  Please view results for these tests on the individual orders.    RAINBOW RED [[id number]] Collected: 11/03/22 1413    Specimen: Blood, Venous Updated: 11/03/22 1423          Imaging Results          ULTRASOUND KIDNEYS (Final result)  Result time 11/03/22 17:16:51    Final result                 Impression:    IMPRESSION: Unremarkable sonographic appearance of the kidneys.             Narrative:      CLINICAL HISTORY: c/f stone, please also eval with ureteral jets   . Right lower  quadrant pain radiating to right flank. Hematuria.    Comparison: None.    COMMENT: Ultrasound of the kidneys was performed.    The right kidney measures 10.7 cm.    The left kidney measures 11.3 cm.    There is no evidence of renal cysts, shadowing calculi, solid contour deforming  masses or hydronephrosis.    The renal parenchymal echogenicity is within normal limits.    No ureteral jets were demonstrated on survey imaging of the urinary bladder.  However, this may be technical in this patient without hydronephrosis.                               ULTRASOUND PELVIS LIMITED TRANSABDOMINAL ONLY (Final result)  Result time 11/03/22 17:14:37   Procedure changed from ULTRASOUND PELVIS COMPLETE TRANSABDOMINAL ONLY     Final result                 Impression:     IMPRESSION: Unremarkable pelvic ultrasound.               Narrative:      CLINICAL HISTORY: L pelvic pain, hx of ovarian cyst   .    Comparison: None.    COMMENT:    Transabdominal limited pelvic ultrasound and transvaginal examination of the  pelvis is performed. Transabdominal ultrasound was performed to allow a broad  examination of the pelvis to visualize structures that cannot be seen with  transvaginal imaging alone. Transvaginal scans were performed for better  delineation of the adnexa.    Uterus/Endometrium: The uterus measures 6.8 x 3.0 x 3.2 cm. Uterine parenchyma  appears homogeneous. No focal myometrial masses are identified. The endometrial  stripe measures 0.5 cm in maximum diameter.    Right Ovary: The right ovary measures 2.8 x 1.3 x 2.7 cm. The right ovary is  normal in echotexture with only tiny follicles.    The Left Ovary:  The left ovary measures 2.9 x 1.5 x 3.0 cm. The left ovary is  normal in echotexture with only tiny follicles.    Peritoneum:  There is no significant free fluid in the cul-de-sac.    Additional findings:  All of the pelvic structures were best demonstrated on  transvaginal imaging on this exam.                                ECG 12 lead             Scoring tools                                  ED Course & MDM   MDM / ED COURSE / CLINICAL IMPRESSION / DISPO     MDM  Number of Diagnoses or Management Options  Right lower quadrant abdominal pain  Diagnosis management comments: 22-year-old who presents with 1 day of right lower quadrant abdominal pain in the setting of known ovarian cyst complicated by syncopal type episode, likely related to POTS/vasovagal syncope while at doctor's office  Vital signs stable, patient overall well-appearing, in no acute distress, neuro examination within normal limits, patient does have tenderness to palpation in right lower quadrant/right pelvic area  Will obtain labs, pelvic ultrasound, renal ultrasound to evaluate for stones and  torsion  Pain controlled, patient has negative work-up, patient stable for discharge with PCP follow-up      ED Course as of 11/03/22 2053   Thu Nov 03, 2022 1732 IMPRESSION: Unremarkable sonographic appearance of the kidneys.   [MK]   1734 IMPRESSION: Unremarkable pelvic ultrasound. [MK]      ED Course User Index  [MK] Aleksandar Tyson MD     Clinical Impression      Right lower quadrant abdominal pain     _________________     ED Disposition   Discharge                   Aleksandar Tyson MD  Resident  11/03/22 2054

## 2022-11-03 NOTE — ED ATTESTATION NOTE
"Procedures  Physical Exam  Review of Systems    11/3/08481:51 PM  I have personally seen and examined the patient.  I reviewed and agree with the PA/NP/Resident's assessment and plan of care.    My examination, assessment, and plan of care of Kyaw Harrison is as follows:  The patient presents with pain in her right inguinal region radiating into her right back since last night.  She feels the pain started quite suddenly and she felt a \"pop.\"  The patient was going to her doctor's office for evaluation and had pain and became very flushed, diaphoretic, and passed out with some seizure-like activity.  She had no significant postictal period.  The patient has a history of pots and has had \"conversion disorder.\"  She has been evaluated with MRIs and EEGs and does not have a true seizure disorder.  She has a prescription for Klonopin but does not take it regularly.  She has never had her license removed for these episodes.  Patient has mild nausea, there is pain radiating from her right pelvis and inguinal region into her back.  She denies any pain radiating down her right leg.  There is a significant family history of kidney stones and the patient admits to frequent urinary infections.  Exam: The patient is alert in no acute distress.  Her skin is warm and dry.  She has mild CVA tenderness.  Her abdomen is diffusely tender without voluntary guarding or rebound tenderness.  There is no muscular tenderness in the back and straight leg raising is negative.  She is neurovascularly intact throughout.  Impression/Plan: The patient is being evaluated with lab work and will go for ultrasound of the pelvis, renal ultrasound with ureteral jets, etc.  We are treating her right now with IV fluids and Toradol.  We feel her syncope was vasovagal in origin.  She can follow-up with her own PCP for that.    Ultrasound of the pelvis was unremarkable, ultrasound of the kidneys was unremarkable.  The patient was discharged home to treat " symptomatically and she will follow-up with her own PCP.  She was given strict return precautions.    Vital Sign Review: Vital signs have been ordered and reviewed. The oxygen saturation is 100% on room air, normal     I was physically present for the key/critical portions of the following procedures: None    This document was created using dragon dictation software.  There might be some typographical errors due to this technology.     Vinod Nelson, DO  11/03/22 0988

## 2022-11-04 LAB
ATRIAL RATE: 98
P AXIS: 72
PR INTERVAL: 162
QRS DURATION: 78
QT INTERVAL: 332
QTC CALCULATION(BAZETT): 423
R AXIS: 85
T WAVE AXIS: 55
VENTRICULAR RATE: 98

## 2022-11-15 ENCOUNTER — TELEPHONE (OUTPATIENT)
Dept: FAMILY MEDICINE | Facility: CLINIC | Age: 22
End: 2022-11-15

## 2022-11-15 NOTE — TELEPHONE ENCOUNTER
HFU Visit needed ...11/3/2022 -- treated @ [facility] -abdominal pain.    I am not finding any availability with PCP... Call Beatriz brown ph# [phone]

## 2022-11-16 NOTE — TELEPHONE ENCOUNTER
Called Beatriz again to try to confirm appointment. She states that they should be able to make the appointment, if not she will call and reschedule.

## 2022-11-16 NOTE — TELEPHONE ENCOUNTER
Scheduled patient for an appointment on 11/18/2022 with Tali @ 10am. Called patient mother Beatriz to confirm appointment and LVM to callback to let me know if they can make that appointment.

## 2022-11-18 ENCOUNTER — OFFICE VISIT (OUTPATIENT)
Dept: FAMILY MEDICINE | Facility: CLINIC | Age: 22
End: 2022-11-18
Payer: COMMERCIAL

## 2022-11-18 VITALS
WEIGHT: 142 LBS | OXYGEN SATURATION: 98 % | HEART RATE: 101 BPM | HEIGHT: 64 IN | TEMPERATURE: 98 F | DIASTOLIC BLOOD PRESSURE: 70 MMHG | SYSTOLIC BLOOD PRESSURE: 110 MMHG | BODY MASS INDEX: 24.24 KG/M2

## 2022-11-18 DIAGNOSIS — Z62.810 HISTORY OF SEXUAL ABUSE IN CHILDHOOD: ICD-10-CM

## 2022-11-18 DIAGNOSIS — F43.10 PTSD (POST-TRAUMATIC STRESS DISORDER): ICD-10-CM

## 2022-11-18 DIAGNOSIS — R10.2 PELVIC PAIN: ICD-10-CM

## 2022-11-18 DIAGNOSIS — Z83.3 FAMILY HISTORY OF DIABETES MELLITUS (DM): ICD-10-CM

## 2022-11-18 DIAGNOSIS — Z87.440 HISTORY OF CHRONIC URINARY TRACT INFECTION: ICD-10-CM

## 2022-11-18 DIAGNOSIS — Q79.60 EHLERS-DANLOS SYNDROME: ICD-10-CM

## 2022-11-18 DIAGNOSIS — Z87.898 HISTORY OF SEIZURE: ICD-10-CM

## 2022-11-18 DIAGNOSIS — K31.84 GASTROPARESIS: ICD-10-CM

## 2022-11-18 DIAGNOSIS — N92.6 ABNORMAL MENSES: ICD-10-CM

## 2022-11-18 DIAGNOSIS — Z81.8 FAMILY HISTORY OF BIPOLAR DISORDER: ICD-10-CM

## 2022-11-18 DIAGNOSIS — E16.2 HYPOGLYCEMIA: Primary | ICD-10-CM

## 2022-11-18 DIAGNOSIS — F41.9 ANXIETY: ICD-10-CM

## 2022-11-18 DIAGNOSIS — M62.89 PELVIC FLOOR DYSFUNCTION: ICD-10-CM

## 2022-11-18 DIAGNOSIS — Z86.39 HISTORY OF HYPOTHYROIDISM: ICD-10-CM

## 2022-11-18 DIAGNOSIS — G43.109 MIGRAINE WITH AURA AND WITHOUT STATUS MIGRAINOSUS, NOT INTRACTABLE: ICD-10-CM

## 2022-11-18 DIAGNOSIS — Z87.42 HISTORY OF OVARIAN CYST: ICD-10-CM

## 2022-11-18 DIAGNOSIS — Z23 NEED FOR VACCINATION: ICD-10-CM

## 2022-11-18 DIAGNOSIS — A18.01 POTT'S DISEASE: ICD-10-CM

## 2022-11-18 PROCEDURE — 3008F BODY MASS INDEX DOCD: CPT | Performed by: NURSE PRACTITIONER

## 2022-11-18 PROCEDURE — 90686 IIV4 VACC NO PRSV 0.5 ML IM: CPT | Performed by: NURSE PRACTITIONER

## 2022-11-18 PROCEDURE — 90471 IMMUNIZATION ADMIN: CPT | Performed by: NURSE PRACTITIONER

## 2022-11-18 PROCEDURE — 99215 OFFICE O/P EST HI 40 MIN: CPT | Mod: 25 | Performed by: NURSE PRACTITIONER

## 2022-11-18 RX ORDER — DESMOPRESSIN ACETATE 0.1 MG/1
TABLET ORAL
COMMUNITY
End: 2024-03-01

## 2022-11-18 RX ORDER — PROPRANOLOL HYDROCHLORIDE 10 MG/1
1 TABLET ORAL 3 TIMES DAILY
COMMUNITY
Start: 2022-05-29 | End: 2022-11-29 | Stop reason: SDUPTHER

## 2022-11-18 ASSESSMENT — ENCOUNTER SYMPTOMS
HEADACHES: 1
JOINT SWELLING: 1
DIARRHEA: 1
NERVOUS/ANXIOUS: 1
VOMITING: 0
CONSTIPATION: 0
PALPITATIONS: 1
ABDOMINAL PAIN: 1
SHORTNESS OF BREATH: 1
UNEXPECTED WEIGHT CHANGE: 0

## 2022-11-18 NOTE — PATIENT INSTRUCTIONS
Please follow up with Dr Cook     Please consider probiotic    Please see Urogyn     We can check A1c and insulin with next labs     Please really consider mood stabilizer consider lamictal if you have not trialed it    Eat small frequent meals with carbs and protein and fat every 4-5 hours   Avoid high sugar foods   Cake gel for low blood sugar      [facility]  Vignesh Gonzales and Sj Conteh and NP Chloé Benson  [phone]    [address] [phone]    Dr Heredia minimally invasive surgery     Dr Margret Ramirez [facility] [place] [phone]    Boubacar Hamlin Dawson [phone]  Dr Edward Hamlin  [place] [phone]
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
## 2022-11-18 NOTE — ASSESSMENT & PLAN NOTE
Follows with psych regularly- Dr Cook and therapist   trialed multiple medications most recently Latuda but did not tolerate   On nortriptlyine for years   Encouraged her to follow up with Dr Cook neuropysch

## 2022-11-28 ENCOUNTER — HOSPITAL ENCOUNTER (OUTPATIENT)
Facility: HOSPITAL | Age: 22
Setting detail: OBSERVATION
Discharge: HOME | End: 2022-11-29
Attending: EMERGENCY MEDICINE | Admitting: STUDENT IN AN ORGANIZED HEALTH CARE EDUCATION/TRAINING PROGRAM
Payer: COMMERCIAL

## 2022-11-28 ENCOUNTER — APPOINTMENT (EMERGENCY)
Dept: RADIOLOGY | Facility: HOSPITAL | Age: 22
End: 2022-11-28
Attending: STUDENT IN AN ORGANIZED HEALTH CARE EDUCATION/TRAINING PROGRAM
Payer: COMMERCIAL

## 2022-11-28 DIAGNOSIS — R55 SYNCOPE AND COLLAPSE: Primary | ICD-10-CM

## 2022-11-28 DIAGNOSIS — R00.0 TACHYCARDIA: ICD-10-CM

## 2022-11-28 LAB
ALBUMIN SERPL-MCNC: 4.1 G/DL (ref 3.4–5)
ALP SERPL-CCNC: 44 IU/L (ref 35–126)
ALT SERPL-CCNC: 14 IU/L (ref 11–54)
ANION GAP SERPL CALC-SCNC: 5 MEQ/L (ref 3–15)
AST SERPL-CCNC: 17 IU/L (ref 15–41)
B-HCG UR QL: NEGATIVE
BACTERIA URNS QL MICRO: ABNORMAL /HPF
BASOPHILS # BLD: 0.03 K/UL (ref 0.01–0.1)
BASOPHILS NFR BLD: 0.3 %
BILIRUB SERPL-MCNC: 0.9 MG/DL (ref 0.3–1.2)
BILIRUB UR QL STRIP.AUTO: NEGATIVE MG/DL
BUN SERPL-MCNC: 13 MG/DL (ref 8–20)
CALCIUM SERPL-MCNC: 8.8 MG/DL (ref 8.9–10.3)
CHLORIDE SERPL-SCNC: 108 MEQ/L (ref 98–109)
CLARITY UR REFRACT.AUTO: CLEAR
CO2 SERPL-SCNC: 24 MEQ/L (ref 22–32)
COLOR UR AUTO: YELLOW
CREAT SERPL-MCNC: 0.6 MG/DL (ref 0.6–1.1)
D DIMER PPP IA.FEU-MCNC: 0.73 UG/ML FEU (ref 0–0.5)
DIFFERENTIAL METHOD BLD: ABNORMAL
EOSINOPHIL # BLD: 0.06 K/UL (ref 0.04–0.36)
EOSINOPHIL NFR BLD: 0.6 %
ERYTHROCYTE [DISTWIDTH] IN BLOOD BY AUTOMATED COUNT: 11.9 % (ref 11.7–14.4)
FLUAV RNA SPEC QL NAA+PROBE: NEGATIVE
FLUBV RNA SPEC QL NAA+PROBE: NEGATIVE
GFR SERPL CREATININE-BSD FRML MDRD: >60 ML/MIN/1.73M*2
GLUCOSE SERPL-MCNC: 91 MG/DL (ref 70–99)
GLUCOSE UR STRIP.AUTO-MCNC: NEGATIVE MG/DL
HCG SERPL-ACNC: <0.6 IU/L (MIU/ML)
HCG UR QL: NEGATIVE
HCT VFR BLDCO AUTO: 43.4 % (ref 35–45)
HGB BLD-MCNC: 14.2 G/DL (ref 11.8–15.7)
HGB UR QL STRIP.AUTO: NEGATIVE
HYALINE CASTS #/AREA URNS LPF: ABNORMAL /LPF
IMM GRANULOCYTES # BLD AUTO: 0.04 K/UL (ref 0–0.08)
IMM GRANULOCYTES NFR BLD AUTO: 0.4 %
KETONES UR STRIP.AUTO-MCNC: NEGATIVE MG/DL
LEUKOCYTE ESTERASE UR QL STRIP.AUTO: NEGATIVE
LYMPHOCYTES # BLD: 0.64 K/UL (ref 1.2–3.5)
LYMPHOCYTES NFR BLD: 6.6 %
MAGNESIUM SERPL-MCNC: 1.9 MG/DL (ref 1.8–2.5)
MCH RBC QN AUTO: 31.2 PG (ref 28–33.2)
MCHC RBC AUTO-ENTMCNC: 32.7 G/DL (ref 32.2–35.5)
MCV RBC AUTO: 95.4 FL (ref 83–98)
MONOCYTES # BLD: 0.46 K/UL (ref 0.28–0.8)
MONOCYTES NFR BLD: 4.7 %
MUCOUS THREADS URNS QL MICRO: ABNORMAL /LPF
NEUTROPHILS # BLD: 8.53 K/UL (ref 1.7–7)
NEUTS SEG NFR BLD: 87.4 %
NITRITE UR QL STRIP.AUTO: NEGATIVE
NRBC BLD-RTO: 0 %
PDW BLD AUTO: 9.5 FL (ref 9.4–12.3)
PH UR STRIP.AUTO: 6 [PH]
PLATELET # BLD AUTO: 241 K/UL (ref 150–369)
POTASSIUM SERPL-SCNC: 4.1 MEQ/L (ref 3.6–5.1)
PROT SERPL-MCNC: 7.3 G/DL (ref 6–8.2)
PROT UR QL STRIP.AUTO: ABNORMAL
RBC # BLD AUTO: 4.55 M/UL (ref 3.93–5.22)
RBC #/AREA URNS HPF: ABNORMAL /HPF
RSV RNA SPEC QL NAA+PROBE: NEGATIVE
SARS-COV-2 RNA RESP QL NAA+PROBE: NEGATIVE
SODIUM SERPL-SCNC: 137 MEQ/L (ref 136–144)
SP GR UR REFRACT.AUTO: >1.03
SQUAMOUS URNS QL MICRO: ABNORMAL /HPF
TROPONIN I SERPL HS-MCNC: <2 PG/ML
TSH SERPL DL<=0.05 MIU/L-ACNC: 1.29 MIU/L (ref 0.34–5.6)
UROBILINOGEN UR STRIP-ACNC: 0.2 EU/DL
WBC # BLD AUTO: 9.76 K/UL (ref 3.8–10.5)
WBC #/AREA URNS HPF: ABNORMAL /HPF

## 2022-11-28 PROCEDURE — 82533 TOTAL CORTISOL: CPT | Performed by: STUDENT IN AN ORGANIZED HEALTH CARE EDUCATION/TRAINING PROGRAM

## 2022-11-28 PROCEDURE — 84484 ASSAY OF TROPONIN QUANT: CPT | Performed by: STUDENT IN AN ORGANIZED HEALTH CARE EDUCATION/TRAINING PROGRAM

## 2022-11-28 PROCEDURE — 63700000 HC SELF-ADMINISTRABLE DRUG: Performed by: STUDENT IN AN ORGANIZED HEALTH CARE EDUCATION/TRAINING PROGRAM

## 2022-11-28 PROCEDURE — 71045 X-RAY EXAM CHEST 1 VIEW: CPT

## 2022-11-28 PROCEDURE — 81003 URINALYSIS AUTO W/O SCOPE: CPT | Performed by: STUDENT IN AN ORGANIZED HEALTH CARE EDUCATION/TRAINING PROGRAM

## 2022-11-28 PROCEDURE — 93005 ELECTROCARDIOGRAM TRACING: CPT | Performed by: EMERGENCY MEDICINE

## 2022-11-28 PROCEDURE — 85025 COMPLETE CBC W/AUTO DIFF WBC: CPT | Performed by: EMERGENCY MEDICINE

## 2022-11-28 PROCEDURE — 63600000 HC DRUGS/DETAIL CODE

## 2022-11-28 PROCEDURE — 96375 TX/PRO/DX INJ NEW DRUG ADDON: CPT | Mod: 59

## 2022-11-28 PROCEDURE — 99285 EMERGENCY DEPT VISIT HI MDM: CPT | Mod: 25

## 2022-11-28 PROCEDURE — 63600105 HC IODINE BASED CONTRAST: Performed by: STUDENT IN AN ORGANIZED HEALTH CARE EDUCATION/TRAINING PROGRAM

## 2022-11-28 PROCEDURE — 93005 ELECTROCARDIOGRAM TRACING: CPT

## 2022-11-28 PROCEDURE — 63600000 HC DRUGS/DETAIL CODE: Mod: JW | Performed by: STUDENT IN AN ORGANIZED HEALTH CARE EDUCATION/TRAINING PROGRAM

## 2022-11-28 PROCEDURE — 85379 FIBRIN DEGRADATION QUANT: CPT | Performed by: STUDENT IN AN ORGANIZED HEALTH CARE EDUCATION/TRAINING PROGRAM

## 2022-11-28 PROCEDURE — 81025 URINE PREGNANCY TEST: CPT | Performed by: STUDENT IN AN ORGANIZED HEALTH CARE EDUCATION/TRAINING PROGRAM

## 2022-11-28 PROCEDURE — 84702 CHORIONIC GONADOTROPIN TEST: CPT | Performed by: STUDENT IN AN ORGANIZED HEALTH CARE EDUCATION/TRAINING PROGRAM

## 2022-11-28 PROCEDURE — 87637 SARSCOV2&INF A&B&RSV AMP PRB: CPT | Performed by: EMERGENCY MEDICINE

## 2022-11-28 PROCEDURE — G1004 CDSM NDSC: HCPCS

## 2022-11-28 PROCEDURE — 36415 COLL VENOUS BLD VENIPUNCTURE: CPT

## 2022-11-28 PROCEDURE — 25800000 HC PHARMACY IV SOLUTIONS: Performed by: STUDENT IN AN ORGANIZED HEALTH CARE EDUCATION/TRAINING PROGRAM

## 2022-11-28 PROCEDURE — 80053 COMPREHEN METABOLIC PANEL: CPT | Performed by: EMERGENCY MEDICINE

## 2022-11-28 PROCEDURE — 84443 ASSAY THYROID STIM HORMONE: CPT | Performed by: STUDENT IN AN ORGANIZED HEALTH CARE EDUCATION/TRAINING PROGRAM

## 2022-11-28 PROCEDURE — 96374 THER/PROPH/DIAG INJ IV PUSH: CPT | Mod: 59

## 2022-11-28 PROCEDURE — 83735 ASSAY OF MAGNESIUM: CPT | Performed by: STUDENT IN AN ORGANIZED HEALTH CARE EDUCATION/TRAINING PROGRAM

## 2022-11-28 PROCEDURE — 85025 COMPLETE CBC W/AUTO DIFF WBC: CPT

## 2022-11-28 PROCEDURE — 96361 HYDRATE IV INFUSION ADD-ON: CPT

## 2022-11-28 PROCEDURE — 84703 CHORIONIC GONADOTROPIN ASSAY: CPT | Performed by: EMERGENCY MEDICINE

## 2022-11-28 RX ORDER — PROPRANOLOL HYDROCHLORIDE 10 MG/1
10 TABLET ORAL ONCE
Status: COMPLETED | OUTPATIENT
Start: 2022-11-29 | End: 2022-11-29

## 2022-11-28 RX ORDER — ACETAMINOPHEN 325 MG/1
975 TABLET ORAL ONCE
Status: COMPLETED | OUTPATIENT
Start: 2022-11-28 | End: 2022-11-28

## 2022-11-28 RX ORDER — DIPHENHYDRAMINE HCL 50 MG/ML
25 VIAL (ML) INJECTION ONCE
Status: COMPLETED | OUTPATIENT
Start: 2022-11-28 | End: 2022-11-28

## 2022-11-28 RX ORDER — METOCLOPRAMIDE 10 MG/1
10 TABLET ORAL ONCE
Status: DISCONTINUED | OUTPATIENT
Start: 2022-11-28 | End: 2022-11-28

## 2022-11-28 RX ORDER — KETOROLAC TROMETHAMINE 30 MG/ML
15 INJECTION, SOLUTION INTRAMUSCULAR; INTRAVENOUS ONCE
Status: COMPLETED | OUTPATIENT
Start: 2022-11-28 | End: 2022-11-28

## 2022-11-28 RX ORDER — METOCLOPRAMIDE HYDROCHLORIDE 5 MG/ML
INJECTION INTRAMUSCULAR; INTRAVENOUS
Status: COMPLETED
Start: 2022-11-28 | End: 2022-11-28

## 2022-11-28 RX ORDER — METOCLOPRAMIDE HYDROCHLORIDE 5 MG/ML
10 INJECTION INTRAMUSCULAR; INTRAVENOUS ONCE
Status: COMPLETED | OUTPATIENT
Start: 2022-11-28 | End: 2022-11-28

## 2022-11-28 RX ADMIN — METOCLOPRAMIDE HYDROCHLORIDE 10 MG: 5 INJECTION INTRAMUSCULAR; INTRAVENOUS at 17:38

## 2022-11-28 RX ADMIN — IOHEXOL 100 ML: 350 INJECTION, SOLUTION INTRAVENOUS at 23:27

## 2022-11-28 RX ADMIN — METOCLOPRAMIDE 10 MG: 5 INJECTION, SOLUTION INTRAMUSCULAR; INTRAVENOUS at 17:38

## 2022-11-28 RX ADMIN — SODIUM CHLORIDE 1000 ML: 9 INJECTION, SOLUTION INTRAVENOUS at 20:35

## 2022-11-28 RX ADMIN — KETOROLAC TROMETHAMINE 15 MG: 30 INJECTION, SOLUTION INTRAMUSCULAR at 21:49

## 2022-11-28 RX ADMIN — SODIUM CHLORIDE 1000 ML: 9 INJECTION, SOLUTION INTRAVENOUS at 17:37

## 2022-11-28 RX ADMIN — DIPHENHYDRAMINE HYDROCHLORIDE 25 MG: 50 INJECTION INTRAMUSCULAR; INTRAVENOUS at 17:38

## 2022-11-28 RX ADMIN — ACETAMINOPHEN 975 MG: 325 TABLET ORAL at 17:38

## 2022-11-28 ASSESSMENT — ENCOUNTER SYMPTOMS
COUGH: 0
RHINORRHEA: 0
FREQUENCY: 1
CHILLS: 0
ABDOMINAL PAIN: 1
FEVER: 0
SORE THROAT: 1
SHORTNESS OF BREATH: 0
HEADACHES: 1
VOMITING: 1
DIARRHEA: 1
AGITATION: 0
DYSURIA: 1

## 2022-11-28 NOTE — ED ATTESTATION NOTE
"ED ATTENDING ATTESTATION NOTE  11/28/2022, 5:24 PM    I supervised care provided by the Resident (Michael).   We have discussed the case.  I have personally seen and examined the patient, and have reviewed and agree with their assessment and plan of care.     My examination, assessment, and plan of care of Kyaw Harrison is as follows:  The patient presents with 5 \"falls\" over the past 3 weeks, with possible head injury, most recently PTA while being evaluated at [facility]. The episode today was reported as syncope . There was no injury today. She has a h/o POTS and migraines. She complains of headache, photophobia, and phonophobia. In additions she reports L flank pain that has been occurring over the past 3 weeks, with associated dysuria, hematuria, and frequency with N/V/D over the past few days.       Vital Signs Review: Vital signs have been reviewed. The initial oxygen saturation is SpO2: 98 % on room air. Interpretation: normal  ED Vitals    Date/Time Temp Pulse Resp BP SpO2 [facility]   11/28/22 1622 36.2 °C (97.1 °F) 116 18 124/75 98 % RP        PHYSICAL EXAM  Constitutional:    Comments: Appears in no acute distress  HENT:   Head: Normocephalic and atraumatic.   Cardiovascular:   Rate and Rhythm: Tachycardia, regular rhythm.   Heart sounds: Normal heart sounds.   Pulmonary:   Effort: Pulmonary effort is normal. No respiratory distress.   Breath sounds: Normal breath sounds.   Abdominal:   Palpation: Abdomen is soft.   Tenderness: There is mild tenderness in the LLQ, without CVAT, guarding or rebound.   Neurological:   Mental Status: Alert and oriented to person, place, and time. Mental status is at baseline. Grossly nonfocal.    Impression/Plan: headache, photophobia, phonophobia after a syncopal episode at a [facility] PTA in a pt. with POTS and migraines, who also reports LLQ pain and dysuria x 3 weeks.  CBC, CMP, troponin, UA, magnesium level, beta-hCG qual, CXR, COVID/flu/RSV test, IV fluids, p.o. Tylenol, IV Benadryl, IV " Reglan. Reevaluate.      *This document was created using dragon dictation software.  There might be some typographical errors due to this technology. 29     Bibi Burgess MD  12/13/22 3062

## 2022-11-28 NOTE — ED PROVIDER NOTES
Emergency Medicine Note  HPI   HISTORY OF PRESENT ILLNESS     Kyaw Harrison is a 22-year-old female with past medical history of postural orthostatic tachycardic syndrome, migraines, postconcussive syndrome.  Presents today with headache, photophobia, phonophobia.  Patient states that over the past 3 weeks she has fallen approximately 5 times.  States that she remembers the episode immediately before and immediately after.  Had episode of syncope in urgent care prior to presentation, urgent care called EMS. There are 2 instances in which patient is unsure if head was injured during fall. States that she has trialed Tylenol without relief for headache.     Additionally, patient endorses left flank pain that radiates to the left lower quadrant, labs as sharp and intermittent.  States that this has been present for approximately 3 weeks with burning on urination, blood in urine, frequency. History of UTIs. Patient says she woke today feeling feverish and sweaty. Patient also endorses diarrhea occurring over the last several days and nausea with vomiting.  Has sick contact in mother with URI-like symptoms. Patient endorses sore throat.  Patient denies blood in vomit. History of appendectomy. Vomiting occurred before the fall today, has pain in back of head from fall.            Patient History   PAST HISTORY     Reviewed from Nursing Triage:       Past Medical History:   Diagnosis Date   • Anxiety    • Concussion    • Hypoglycemia    • POP (persistent occipitoposterior position)    • POTS (postural orthostatic tachycardia syndrome)        Past Surgical History:   Procedure Laterality Date   • APPENDECTOMY     • SKIN BIOPSY         Family History   Problem Relation Age of Onset   • Diabetes Biological Mother    • Bipolar disorder Biological Father    • JOSE RAUL disease Biological Father    • No Known Problems Biological Sister         gastroparesis unknown    • Kidney disease Maternal Grandmother    • Hyperlipidemia Maternal  Grandmother    • Diabetes Maternal Grandfather    • Heart disease Maternal Grandfather    • Lupus Paternal Grandmother    • Thyroid disease Paternal Grandmother    • Hydrocephalus Paternal Grandmother    • Bipolar disorder Paternal Grandmother    • Atrial fibrillation Paternal Grandfather        Social History     Tobacco Use   • Smoking status: Never   • Smokeless tobacco: Never   Vaping Use   • Vaping Use: Never used   Substance Use Topics   • Alcohol use: Never   • Drug use: Never         Review of Systems   REVIEW OF SYSTEMS     Review of Systems   Constitutional: Negative for chills and fever.   HENT: Positive for sore throat. Negative for congestion and rhinorrhea.    Eyes: Negative for visual disturbance.   Respiratory: Negative for cough and shortness of breath.    Cardiovascular: Negative for chest pain and leg swelling.   Gastrointestinal: Positive for abdominal pain, diarrhea and vomiting.   Genitourinary: Positive for dysuria and frequency. Negative for vaginal bleeding and vaginal discharge.   Skin: Negative for rash.   Neurological: Positive for syncope and headaches.   Psychiatric/Behavioral: Negative for agitation.         VITALS     ED Vitals    Date/Time Temp Pulse Resp BP SpO2 [facility]   11/28/22 1622 36.2 °C (97.1 °F) 116 18 124/75 98 % RP                       Physical Exam   PHYSICAL EXAM     Physical Exam  Constitutional:       General: She is not in acute distress.     Appearance: She is not ill-appearing, toxic-appearing or diaphoretic.   HENT:      Head: Normocephalic and atraumatic.      Nose: No congestion or rhinorrhea.      Mouth/Throat:      Mouth: Mucous membranes are moist.      Pharynx: No oropharyngeal exudate or posterior oropharyngeal erythema.   Eyes:      Extraocular Movements: Extraocular movements intact.      Conjunctiva/sclera: Conjunctivae normal.      Pupils: Pupils are equal, round, and reactive to light.   Cardiovascular:      Rate and Rhythm: Normal rate and regular rhythm.       Heart sounds: Normal heart sounds.   Pulmonary:      Effort: Pulmonary effort is normal.      Breath sounds: Normal breath sounds.   Abdominal:      General: Abdomen is flat.      Palpations: Abdomen is soft.      Tenderness: There is no right CVA tenderness or left CVA tenderness.      Comments: Mild LLQ TTP   Musculoskeletal:      Right lower leg: No edema.      Left lower leg: No edema.      Comments: No tenderness palpation or visible signs of trauma on thorax, spine, extremities   Skin:     General: Skin is warm.      Capillary Refill: Capillary refill takes less than 2 seconds.   Neurological:      Mental Status: She is alert and oriented to person, place, and time.           PROCEDURES     Procedures     DATA     Results     Procedure Component Value Units Date/Time    CBC and differential [[id number]]  (Abnormal) Collected: 11/28/22 1633    Specimen: Blood, Venous Updated: 11/28/22 1645     WBC 9.76 K/uL      RBC 4.55 M/uL      Hemoglobin 14.2 g/dL      Hematocrit 43.4 %      MCV 95.4 fL      MCH 31.2 pg      MCHC 32.7 g/dL      RDW 11.9 %      Platelets 241 K/uL      MPV 9.5 fL      Differential Type Auto     nRBC 0.0 %      Immature Granulocytes 0.4 %      Neutrophils 87.4 %      Lymphocytes 6.6 %      Monocytes 4.7 %      Eosinophils 0.6 %      Basophils 0.3 %      Immature Granulocytes, Absolute 0.04 K/uL      Neutrophils, Absolute 8.53 K/uL      Lymphocytes, Absolute 0.64 K/uL      Monocytes, Absolute 0.46 K/uL      Eosinophils, Absolute 0.06 K/uL      Basophils, Absolute 0.03 K/uL     Magnesium [[id number]] Collected: 11/28/22 1633    Specimen: Blood, Venous Updated: 11/28/22 1640    BhCG, Serum, Quant [[id number]] Collected: 11/28/22 1633    Specimen: Blood, Venous Updated: 11/28/22 1640    Extra Tube Red [[id number]] Collected: 11/28/22 1633    Specimen: Blood, Venous Updated: 11/28/22 1639    Extra Tube Gold [[id number]] Collected: 11/28/22 1633    Specimen: Blood, Venous Updated: 11/28/22 1639     Extra Tube Lt Green [[id number]] Collected: 11/28/22 1633    Specimen: Blood, Venous Updated: 11/28/22 1639    Extra Tubes [[id number]] Collected: 11/28/22 1633    Specimen: Blood, Venous Updated: 11/28/22 1639    Narrative:      The following orders were created for panel order Extra Tubes.  Procedure                               Abnormality         Status                     ---------                               -----------         ------                     Extra Tube Lt Blue[[id number]]                               In process                 Extra Tube Red[[id number]]                                   In process                 Extra Tube Lt Green[[id number]]                              In process                 Extra Tube Gold[[id number]]                                  In process                   Please view results for these tests on the individual orders.    Extra Tube Lt Blue [[id number]] Collected: 11/28/22 1633    Specimen: Blood, Venous Updated: 11/28/22 1639    Comprehensive metabolic panel [[id number]] Collected: 11/28/22 1633    Specimen: Blood, Venous Updated: 11/28/22 1639    [place] Draw Panel [[id number]] Collected: 11/28/22 1635    Specimen: Blood, Venous Updated: 11/28/22 1639    Narrative:      The following orders were created for panel order [place] Draw Panel.  Procedure                               Abnormality         Status                     ---------                               -----------         ------                     [place] Blood Culture[[id number]]                            In process                   Please view results for these tests on the individual orders.    [place] Blood Culture [[id number]] Collected: 11/28/22 1635    Specimen: Blood, Venous Updated: 11/28/22 1639          Imaging Results          X-RAY CHEST 1 VIEW (Final result)  Result time 11/28/22 16:50:58    Final result                 Impression:    IMPRESSION:    No active disease.    COMMENT:    A single AP  view of the chest was performed.    Comparison: None.    The heart size and pulmonary vasculature are within normal limits.    The lung fields are clear, and no pleural effusions are seen.    No hilar or mediastinal abnormality is seen.    No osseous abnormality is noted.    Electrocardiogram leads and wires could obscure small lesions.                 Narrative:    CLINICAL HISTORY: Syncope                                ECG 12 lead    (Results Pending)       Scoring tools                                  ED Course & MDM   MDM / ED COURSE / CLINICAL IMPRESSION / DISPO     MDM  Number of Diagnoses or Management Options  Diagnosis management comments: We will treat with migraine cocktail, obtain EKG, tropes, urinalysis with culture, pregnancy test, give IV fluids.  Low concern for traumatic injury.  With regards to syncope, has stylish care with cardiology, in setting of known POTS and known syncope.  Describes viral prodrome with regards to diarrhea and URI symptoms.  Please see ED course for further work-up and management.      ED Course as of 11/29/22 0227 Mon Nov 28, 2022   1705 No active disease. [DS]   2140 TSH: 1.29 [DS]   2200 D-Dimer, Quant(!): 0.73 [DS]   2251 Pt signed out to me with concerns for POTS persistent tachycardia at this time will get PE study to rule out PE.  [RW]   2352 Rapid response was called to the CT after giving contrast.  Patient states that she feels like her lips are tingling and she feels short of breath lungs are clear at that time no evidence of rash, swelling or wheezing.  Given benadryl. Pt now requesting home meds.   [RW]   Tue Nov 29, 2022   0225 Patient evaluated after fluids and CT scan patient states she still feels tachycardic at this time attempted to get her up and walk she felt very lightheaded and dizzy at that time.  Patient denies any syncope.  Patient feels that she does not feel safe at home is requesting admission at this time.  I spoke to the patient that mostly  they would work on fluid hydration and reevaluation however patient is concerned she is not normally tachycardic.  [RW]      ED Course User Index  [DS] Lawrence Rodriguez MD  [RW] Navi Lorenzana MD     Clinical Impression      None               Lawrence Rodriguez MD  Resident  11/28/22 [id number]       Lawrence Rodriguez MD  Resident  11/29/22 [id number]

## 2022-11-29 ENCOUNTER — TELEPHONE (OUTPATIENT)
Dept: SCHEDULING | Facility: CLINIC | Age: 22
End: 2022-11-29
Payer: COMMERCIAL

## 2022-11-29 VITALS
TEMPERATURE: 97.8 F | HEART RATE: 102 BPM | HEIGHT: 64 IN | WEIGHT: 130 LBS | DIASTOLIC BLOOD PRESSURE: 62 MMHG | RESPIRATION RATE: 17 BRPM | SYSTOLIC BLOOD PRESSURE: 107 MMHG | BODY MASS INDEX: 22.2 KG/M2 | OXYGEN SATURATION: 98 %

## 2022-11-29 PROBLEM — R55 SYNCOPE AND COLLAPSE: Status: ACTIVE | Noted: 2022-11-29

## 2022-11-29 LAB
ANION GAP SERPL CALC-SCNC: 9 MEQ/L (ref 3–15)
ATRIAL RATE: 115
BUN SERPL-MCNC: 8 MG/DL (ref 8–20)
CALCIUM SERPL-MCNC: 7.8 MG/DL (ref 8.9–10.3)
CHLORIDE SERPL-SCNC: 107 MEQ/L (ref 98–109)
CO2 SERPL-SCNC: 19 MEQ/L (ref 22–32)
CORTIS SERPL-MCNC: 20 UG/DL
CREAT SERPL-MCNC: 0.6 MG/DL (ref 0.6–1.1)
ERYTHROCYTE [DISTWIDTH] IN BLOOD BY AUTOMATED COUNT: 11.9 % (ref 11.7–14.4)
GFR SERPL CREATININE-BSD FRML MDRD: >60 ML/MIN/1.73M*2
GLUCOSE SERPL-MCNC: 96 MG/DL (ref 70–99)
HCT VFR BLDCO AUTO: 34.7 % (ref 35–45)
HGB BLD-MCNC: 11.6 G/DL (ref 11.8–15.7)
MAGNESIUM SERPL-MCNC: 1.8 MG/DL (ref 1.8–2.5)
MCH RBC QN AUTO: 31.9 PG (ref 28–33.2)
MCHC RBC AUTO-ENTMCNC: 33.4 G/DL (ref 32.2–35.5)
MCV RBC AUTO: 95.3 FL (ref 83–98)
P AXIS: 70
PDW BLD AUTO: 10.1 FL (ref 9.4–12.3)
PLATELET # BLD AUTO: 210 K/UL (ref 150–369)
POTASSIUM SERPL-SCNC: 3.4 MEQ/L (ref 3.6–5.1)
PR INTERVAL: 144
QRS DURATION: 74
QT INTERVAL: 328
QTC CALCULATION(BAZETT): 453
R AXIS: 86
RBC # BLD AUTO: 3.64 M/UL (ref 3.93–5.22)
SODIUM SERPL-SCNC: 135 MEQ/L (ref 136–144)
T WAVE AXIS: 46
VENTRICULAR RATE: 115
WBC # BLD AUTO: 4.98 K/UL (ref 3.8–10.5)

## 2022-11-29 PROCEDURE — 63700000 HC SELF-ADMINISTRABLE DRUG: Performed by: STUDENT IN AN ORGANIZED HEALTH CARE EDUCATION/TRAINING PROGRAM

## 2022-11-29 PROCEDURE — G0378 HOSPITAL OBSERVATION PER HR: HCPCS

## 2022-11-29 PROCEDURE — 83735 ASSAY OF MAGNESIUM: CPT | Performed by: STUDENT IN AN ORGANIZED HEALTH CARE EDUCATION/TRAINING PROGRAM

## 2022-11-29 PROCEDURE — 85027 COMPLETE CBC AUTOMATED: CPT | Performed by: STUDENT IN AN ORGANIZED HEALTH CARE EDUCATION/TRAINING PROGRAM

## 2022-11-29 PROCEDURE — 99999 PR OFFICE/OUTPT VISIT,PROCEDURE ONLY: CPT | Performed by: STUDENT IN AN ORGANIZED HEALTH CARE EDUCATION/TRAINING PROGRAM

## 2022-11-29 PROCEDURE — 80048 BASIC METABOLIC PNL TOTAL CA: CPT | Performed by: STUDENT IN AN ORGANIZED HEALTH CARE EDUCATION/TRAINING PROGRAM

## 2022-11-29 PROCEDURE — 36415 COLL VENOUS BLD VENIPUNCTURE: CPT | Performed by: STUDENT IN AN ORGANIZED HEALTH CARE EDUCATION/TRAINING PROGRAM

## 2022-11-29 PROCEDURE — 99235 HOSP IP/OBS SAME DATE MOD 70: CPT | Performed by: STUDENT IN AN ORGANIZED HEALTH CARE EDUCATION/TRAINING PROGRAM

## 2022-11-29 PROCEDURE — 63700000 HC SELF-ADMINISTRABLE DRUG: Performed by: EMERGENCY MEDICINE

## 2022-11-29 PROCEDURE — 63700000 HC SELF-ADMINISTRABLE DRUG: Performed by: PHYSICIAN ASSISTANT

## 2022-11-29 RX ORDER — PANTOPRAZOLE SODIUM 40 MG/1
40 TABLET, DELAYED RELEASE ORAL DAILY
Qty: 10 TABLET | Refills: 0 | Status: SHIPPED | OUTPATIENT
Start: 2022-11-30 | End: 2023-05-25

## 2022-11-29 RX ORDER — DEXTROSE 50 % IN WATER (D50W) INTRAVENOUS SYRINGE
25 AS NEEDED
Status: DISCONTINUED | OUTPATIENT
Start: 2022-11-29 | End: 2022-11-29 | Stop reason: HOSPADM

## 2022-11-29 RX ORDER — FLUCONAZOLE 150 MG/1
150 TABLET ORAL ONCE
Status: COMPLETED | OUTPATIENT
Start: 2022-11-29 | End: 2022-11-29

## 2022-11-29 RX ORDER — PROPRANOLOL HYDROCHLORIDE 10 MG/1
10 TABLET ORAL 3 TIMES DAILY
Status: DISCONTINUED | OUTPATIENT
Start: 2022-11-29 | End: 2022-11-29

## 2022-11-29 RX ORDER — ONDANSETRON HYDROCHLORIDE 2 MG/ML
4 INJECTION, SOLUTION INTRAVENOUS EVERY 8 HOURS PRN
Status: DISCONTINUED | OUTPATIENT
Start: 2022-11-29 | End: 2022-11-29 | Stop reason: HOSPADM

## 2022-11-29 RX ORDER — IBUPROFEN 200 MG
16-32 TABLET ORAL AS NEEDED
Status: DISCONTINUED | OUTPATIENT
Start: 2022-11-29 | End: 2022-11-29 | Stop reason: HOSPADM

## 2022-11-29 RX ORDER — NORTRIPTYLINE HYDROCHLORIDE 25 MG/1
25 CAPSULE ORAL NIGHTLY
Status: DISCONTINUED | OUTPATIENT
Start: 2022-11-29 | End: 2022-11-29 | Stop reason: HOSPADM

## 2022-11-29 RX ORDER — ACETAMINOPHEN 325 MG/1
650 TABLET ORAL EVERY 4 HOURS PRN
Status: DISCONTINUED | OUTPATIENT
Start: 2022-11-29 | End: 2022-11-29 | Stop reason: HOSPADM

## 2022-11-29 RX ORDER — POTASSIUM CHLORIDE 750 MG/1
40 TABLET, EXTENDED RELEASE ORAL AS NEEDED
Status: DISCONTINUED | OUTPATIENT
Start: 2022-11-29 | End: 2022-11-29 | Stop reason: HOSPADM

## 2022-11-29 RX ORDER — PANTOPRAZOLE SODIUM 40 MG/1
40 TABLET, DELAYED RELEASE ORAL DAILY
Status: DISCONTINUED | OUTPATIENT
Start: 2022-11-29 | End: 2022-11-29 | Stop reason: HOSPADM

## 2022-11-29 RX ORDER — ONDANSETRON 4 MG/1
4 TABLET, ORALLY DISINTEGRATING ORAL EVERY 8 HOURS PRN
Qty: 10 TABLET | Refills: 0 | Status: SHIPPED | OUTPATIENT
Start: 2022-11-29 | End: 2024-09-27 | Stop reason: ALTCHOICE

## 2022-11-29 RX ORDER — PROPRANOLOL HYDROCHLORIDE 20 MG/1
20 TABLET ORAL 2 TIMES DAILY
Status: DISCONTINUED | OUTPATIENT
Start: 2022-11-29 | End: 2022-11-29 | Stop reason: HOSPADM

## 2022-11-29 RX ORDER — DEXTROSE 40 %
15-30 GEL (GRAM) ORAL AS NEEDED
Status: DISCONTINUED | OUTPATIENT
Start: 2022-11-29 | End: 2022-11-29 | Stop reason: HOSPADM

## 2022-11-29 RX ORDER — PROPRANOLOL HYDROCHLORIDE 10 MG/1
20 TABLET ORAL 2 TIMES DAILY
Qty: 120 TABLET | Refills: 0 | Status: SHIPPED | OUTPATIENT
Start: 2022-11-29 | End: 2023-10-05

## 2022-11-29 RX ORDER — POTASSIUM CHLORIDE 750 MG/1
20 TABLET, EXTENDED RELEASE ORAL AS NEEDED
Status: DISCONTINUED | OUTPATIENT
Start: 2022-11-29 | End: 2022-11-29 | Stop reason: HOSPADM

## 2022-11-29 RX ORDER — ONDANSETRON 4 MG/1
4 TABLET, ORALLY DISINTEGRATING ORAL EVERY 8 HOURS PRN
Status: DISCONTINUED | OUTPATIENT
Start: 2022-11-29 | End: 2022-11-29 | Stop reason: HOSPADM

## 2022-11-29 RX ADMIN — PROPRANOLOL HYDROCHLORIDE 10 MG: 10 TABLET ORAL at 00:08

## 2022-11-29 RX ADMIN — PROPRANOLOL HYDROCHLORIDE 20 MG: 20 TABLET ORAL at 11:11

## 2022-11-29 RX ADMIN — ONDANSETRON 4 MG: 4 TABLET, ORALLY DISINTEGRATING ORAL at 03:48

## 2022-11-29 RX ADMIN — FLUCONAZOLE 150 MG: 150 TABLET ORAL at 11:11

## 2022-11-29 RX ADMIN — PANTOPRAZOLE SODIUM 40 MG: 40 TABLET, DELAYED RELEASE ORAL at 11:14

## 2022-11-29 RX ADMIN — ACETAMINOPHEN 650 MG: 325 TABLET ORAL at 03:48

## 2022-11-29 RX ADMIN — ACETAMINOPHEN 650 MG: 325 TABLET ORAL at 09:16

## 2022-11-29 NOTE — H&P
Hospital Medicine Service -  History & Physical        CHIEF COMPLAINT   Chief Complaint   Patient presents with   • Fall   • Dizziness   • Syncope      HISTORY OF PRESENT ILLNESS      Kyaw Harrison is a 22 y.o. female with a past medical history of postural orthostatic tachycardic syndrome (POTS), migraines who presents with headache, loss of consciousness, admitted for syncope.    Patient presents with loss of consciousness, headache. Patient has been recently treated for UTI and completed antibiotic course. She also had an episode of fall/syncope.  After these episodes, she has been having recurrent headache and abdominal pain, also associated with several episodes of loss of consciousness.  Besides the pain, she feels getting up from a sitting position also seems to trigger the syncopal episodes.  She was seen in urgent care today for headache, and after a syncopal episode there she was brought into [facility].  Syncopal episodes last for about 1 to 5 minutes, sometimes preceded by an abnormal taste in her mouth.  During the episodes, her eyes rolled to the back.  Has bilateral hand and leg shaking during some of the episodes.  She normally returns to baseline mental status soon after the episode, but lately has been feeling more groggy for 5 to 10 minutes after the episode.  Denies tongue biting, bowel or urinary incontinence during these episodes.  She has hit her head during these episodes in the past.  Notes having intermittent low blood pressure, as low as 83/53. Her BP is usually -120s.    Patient has multiple other complaints at this time, including blurry vision, left flank pain, abdominal pain, nausea, vomiting, diarrhea, subjective fever, intermittent chills, chest pain, fatigue, dysuria.    Notes family history of Tetralogy of Fallot in maternal uncle.  No sudden cardiac deaths at a young age.    ED course:  Patient in stable condition on presentation. -135, /75. D-dimer 0.73.  UA negative for bacteria. CT angio chest negative for PE. Patient given 1L NS bolus x2, Reglan, Toradol, Tylenol. During CT, complaining of SOB and tingling, given benadryl. Given her recurrent symptoms and tachycardia that is worse than her baseline, patient felt uncomfortable going home and wanted to be admitted for evaluation.       PAST MEDICAL AND SURGICAL HISTORY      Past Medical History:   Diagnosis Date   • Anxiety    • Concussion    • Hypoglycemia    • POP (persistent occipitoposterior position)    • POTS (postural orthostatic tachycardia syndrome)        Past Surgical History:   Procedure Laterality Date   • APPENDECTOMY     • SKIN BIOPSY         PCP: Tali Ugarte CRNP    MEDICATIONS      Prior to Admission medications    Medication Sig Start Date End Date Taking? Authorizing Provider   desmopressin (DDAVP) 0.1 mg tablet Take by mouth.    ProviderNomi MD   JUNEL FE 1/20, 28, 1 mg-20 mcg (21)/75 mg (7) per tablet  12/8/21   Geovanni Skinner MD   midodrine (PROAMATINE) 5 mg tablet Take 5 mg by mouth. 9/26/20   Geovanni Skinner MD   naproxen (NAPROSYN) 500 mg tablet Take 1 tablet (500 mg total) by mouth 2 (two) times a day with meals. 11/3/22 12/3/22  Aleksandar Tyson MD   nortriptyline (PAMELOR) 25 mg capsule TAKE 1 CAPSULE BY MOUTH EVERYDAY AT BEDTIME 7/27/21   Geovanni Skinner MD   propranoloL (INDERAL) 10 mg tablet Take 1 tablet by mouth 3 times daily. 5/29/22   Nomi Skinner MD       ALLERGIES      Aripiprazole, Iodine, and Sertraline    FAMILY HISTORY      Family History   Problem Relation Age of Onset   • Diabetes Biological Mother    • Bipolar disorder Biological Father    • JOSE RAUL disease Biological Father    • No Known Problems Biological Sister         gastroparesis unknown    • Kidney disease Maternal Grandmother    • Hyperlipidemia Maternal Grandmother    • Diabetes Maternal Grandfather    • Heart disease Maternal Grandfather    • Lupus Paternal  Grandmother    • Thyroid disease Paternal Grandmother    • Hydrocephalus Paternal Grandmother    • Bipolar disorder Paternal Grandmother    • Atrial fibrillation Paternal Grandfather        SOCIAL HISTORY      Social History     Socioeconomic History   • Marital status: Single     Spouse name: None   • Number of children: None   • Years of education: None   • Highest education level: None   Tobacco Use   • Smoking status: Never   • Smokeless tobacco: Never   Vaping Use   • Vaping Use: Never used   Substance and Sexual Activity   • Alcohol use: Never   • Drug use: Never   • Sexual activity: Never     Social Determinants of Health     Food Insecurity: No Food Insecurity   • Worried About Running Out of Food in the Last Year: Never true   • Ran Out of Food in the Last Year: Never true       REVIEW OF SYSTEMS        Review of Systems   (Positive findings marked bold)    General ROS: chills, fatigue or subjective fever  Eyes ROS: vision changes, eye pain  ENT ROS: sore throat, dysphagia, rhinorrhea  Respiratory ROS: shortness of breath, cough, increased sputum  Cardiovascular ROS:  chest pain, edema, orthopnea palpitations  Gastrointestinal ROS: abdominal pain, gas/bloating, hematemesis, melena, nausea, vomiting, diarrhea  Genito-Urinary ROS: dysuria, trouble voiding, hematuria  Musculoskeletal ROS:  Lower extremity edema  Neurological ROS: confusion, headaches  Dermatological ROS: rash, itch, bruising  Psychiatric ROS: Suicidal ideation, homicidal ideation, hallucinations, depressed mood, anxiety    PHYSICAL EXAMINATION      Temp:  [36.2 °C (97.1 °F)-36.7 °C (98.1 °F)] 36.7 °C (98.1 °F)  Heart Rate:  [116-136] 128  Resp:  [18-20] 20  BP: (100-124)/(57-75) 100/57  Body mass index is 22.31 kg/m².    Physical Exam  Vitals reviewed.   Constitutional:       General: She is not in acute distress.     Appearance: Normal appearance. She is not ill-appearing.   HENT:      Head: Atraumatic.      Right Ear: External ear normal.       Left Ear: External ear normal.      Nose: No rhinorrhea.   Eyes:      General:         Right eye: No discharge.         Left eye: No discharge.      Extraocular Movements: Extraocular movements intact.   Cardiovascular:      Rate and Rhythm: Regular rhythm. Tachycardia present.      Pulses: Normal pulses.      Heart sounds: Normal heart sounds. No murmur heard.  Pulmonary:      Effort: Pulmonary effort is normal. No respiratory distress.      Breath sounds: Normal breath sounds. No wheezing.   Abdominal:      General: Abdomen is flat. Bowel sounds are normal. There is no distension.      Tenderness: There is no abdominal tenderness.   Musculoskeletal:      Cervical back: Normal range of motion and neck supple.      Right lower leg: No edema.      Left lower leg: No edema.   Skin:     General: Skin is warm and dry.   Neurological:      General: No focal deficit present.      Mental Status: She is alert and oriented to person, place, and time.   Psychiatric:         Mood and Affect: Mood normal.         Behavior: Behavior normal.         LABS / IMAGING / EKG        Labs    Lab Results   Component Value Date     11/28/2022    K 4.1 11/28/2022     11/28/2022    CO2 24 11/28/2022    BUN 13 11/28/2022    CREATININE 0.6 11/28/2022    ALBUMIN 4.1 11/28/2022    ALT 14 11/28/2022    AST 17 11/28/2022    CALCIUM 8.8 (L) 11/28/2022    WBC 9.76 11/28/2022    HGB 14.2 11/28/2022    HCT 43.4 11/28/2022     11/28/2022    HGBA1C 4.9 08/12/2021    MG 1.9 11/28/2022    LACTATE 1.0 11/03/2022    TSH 1.29 11/28/2022       CBC Results       11/28/22 11/03/22 03/25/22     1633 1413 1533    WBC 9.76 6.61 6.70    RBC 4.55 4.30 4.79    HGB 14.2 13.3 14.9    HCT 43.4 41.5 44.6    MCV 95.4 96.5 93.1    MCH 31.2 30.9 31.1    MCHC 32.7 32.0 33.4     305 306        CMP Results       11/28/22 11/03/22 03/25/22     1633 1413 1533     136 137    K 4.1 3.8 4.1    Cl 108 105 103    CO2 24 24 23    Glucose 91 77 66     BUN 13 14 18    Creatinine 0.6 0.7 1.1    Calcium 8.8 9.1 9.5    Anion Gap 5 7 11    AST 17 17 18    ALT 14 15 19    Albumin 4.1 4.0 4.5    EGFR >60.0 >60.0 >60.0         Comment for K at 1633 on 11/28/22: Results obtained on plasma. Plasma Potassium values may be up to 0.4 mEQ/L less than serum values. The differences may be greater for patients with high platelet or white cell counts.    Comment for K at 1413 on 11/03/22: Results obtained on plasma. Plasma Potassium values may be up to 0.4 mEQ/L less than serum values. The differences may be greater for patients with high platelet or white cell counts.    Comment for K at 1533 on 03/25/22: Results obtained on plasma. Plasma Potassium values may be up to 0.4 mEQ/L less than serum values. The differences may be greater for patients with high platelet or white cell counts.          Troponin I Results       11/28/22 11/03/22     1818 1413    HS Troponin I <2.0 <2.0          Imaging  I have independently reviewed the pertinent imaging from the last 24 hrs.                              SARS-CoV-2 (COVID-19) (no units)   Date/Time Value   11/28/2022 1719 Negative       ECG/Telemetry  EKG sinus tachycardia      ASSESSMENT AND PLAN           * Syncope and collapse  Assessment & Plan  > Etiology: Unclear, possible due to POTS vs other. Difficult to assess given myriad complaints.   • Admit to telemetry  • Orthostatic vital signs  • Obtain TTE  • AM cortisol    Migraine with aura and without status migrainosus, not intractable  Assessment & Plan  · Propranolol  · Nortriptyline  · Tylenol       VTE Assessment: [place] VTE Score: 0  VTE Prophylaxis: Current anticoagulants:    •None      Code Status: Full Code  Palliative Care Screening Score: 0   Discussed advanced care planning.  Estimated Discharge Date: 11/29/2022  Disposition Planning: TBD       Author:    This note is signed by the night admitting physician. Please contact this author for any issues or questions between  7PM-7AM (Secure chat preferred).  For any issues/questions after 7AM, please contact day time [facility] physician.     Luis Gauthier MD  Hospitalist/Nocturnist  Department of Internal Medicine   Pager: [phone] (Secure chat preferred)  11/29/22

## 2022-11-29 NOTE — ASSESSMENT & PLAN NOTE
> Etiology: Unclear, possible due to POTS vs other. Difficult to assess given myriad complaints.   • Admit to telemetry  • Orthostatic vital signs  • Obtain TTE  • AM cortisol  
· Propranolol  · Nortriptyline  · Tylenol  
Statement Selected

## 2022-11-29 NOTE — DISCHARGE SUMMARY
Hospital Medicine Service -  Inpatient Discharge Summary        BRIEF OVERVIEW   Admitting Provider: Luis Gauthier MD  Attending Provider: Navi Lorenzana MD;* Attending phys phone: N/A    PCP: Tali Ugarte CRNP [phone]    Admission Date: 11/28/2022  Discharge Date: 11/29/2022     DISCHARGE DIAGNOSES      Primary Discharge Diagnosis  Syncope and collapse    Secondary Discharge Diagnoses  Active Hospital Problems    Diagnosis Date Noted   • Syncope and collapse 11/29/2022   • Migraine with aura and without status migrainosus, not intractable 08/06/2021      Resolved Hospital Problems   No resolved problems to display.       Problem List on Day of Discharge  Migraine with aura and without status migrainosus, not intractable  Assessment & Plan  · Propranolol  · Nortriptyline  · Tylenol    * Syncope and collapse  Assessment & Plan  > Etiology: Unclear, possible due to POTS vs other. Difficult to assess given myriad complaints.   • Admit to telemetry  • Orthostatic vital signs  • Obtain TTE  • AM cortisol      SUMMARY OF HOSPITALIZATION      Presenting Problem/History of Present Illness  This is a 22 y.o. year-old female admitted on 11/28/2022 with Syncope and collapse [R55].      Hospital Course  Kyaw Harrison is a 22 y.o. female with a past medical history of postural orthostatic tachycardic syndrome (POTS), migraines who presents with nausea/vomiting, headache, loss of consciousness, admitted for syncope. Patient received IV fluids. Cardiology evaluated the patient and recommended adjustment of her propranolol medications, along with improvement in sleep hygiene, and regular moderate exercise. With her symptoms improved- would not start salt tablets at this time. She was also treated for possible gastritis with pantoprazole- to complete the course on discharge. She also received one dose of diflucan for suspicion of yeast infection. She is medically stable to be discharged with close  outpatient follow up.       PE:   Gen- in no acute distress,  CV: RRR, No M/G, no JVD  Pulm: CTA B/l, no wheezes or rhonchi, no rales  Abd: Soft, NT, ND, normal bowel sounds  Extremities: normal range of motion      Consults During Admission  IP CONSULT TO CARDIOLOGY    DISCHARGE MEDICATIONS               Medication List      START taking these medications    ondansetron ODT 4 mg disintegrating tablet  Commonly known as: ZOFRAN-ODT  Take 1 tablet (4 mg total) by mouth every 8 (eight) hours as needed for nausea or vomiting for up to 5 days.  Dose: 4 mg     pantoprazole 40 mg EC tablet  Commonly known as: PROTONIX  Start taking on: November 30, 2022  Take 1 tablet (40 mg total) by mouth daily for 10 days Indications: gastroesophageal reflux disease.  Dose: 40 mg        CHANGE how you take these medications    propranoloL 10 mg tablet  Commonly known as: INDERAL  Take 2 tablets (20 mg total) by mouth 2 (two) times a day.  Dose: 20 mg  What changed:   · how much to take  · when to take this        CONTINUE taking these medications    desmopressin 0.1 mg tablet  Commonly known as: DDAVP  Take by mouth.     JUNEL FE 1/20 (28) 1 mg-20 mcg (21)/75 mg (7) per tablet  Generic drug: norethindrone-e.estradioL-iron     midodrine 5 mg tablet  Commonly known as: PROAMATINE  Take 5 mg by mouth.  Dose: 5 mg     naproxen 500 mg tablet  Commonly known as: NAPROSYN  Take 1 tablet (500 mg total) by mouth 2 (two) times a day with meals.  Dose: 500 mg     nortriptyline 25 mg capsule  Commonly known as: PAMELOR  TAKE 1 CAPSULE BY MOUTH EVERYDAY AT BEDTIME                  PROCEDURES / LABS / IMAGING          Pertinent Labs  Results from last 7 days   Lab Units 11/29/22  0355   WBC K/uL 4.98   HEMOGLOBIN g/dL 11.6*   HEMATOCRIT % 34.7*   PLATELETS K/uL 210     Results from last 7 days   Lab Units 11/29/22  0355   SODIUM mEQ/L 135*   POTASSIUM mEQ/L 3.4*   CHLORIDE mEQ/L 107   CO2 mEQ/L 19*   BUN mg/dL 8   CREATININE mg/dL 0.6   GLUCOSE  mg/dL 96   CALCIUM mg/dL 7.8*     Microbiology Results     Procedure Component Value Units Date/Time    SARS-CoV-2 (COVID-19), PCR Nasopharynx [[id number]]  (Normal) Collected: 11/28/22 1719    Specimen: Nasopharyngeal Swab from Nasopharynx Updated: 11/28/22 1820    Narrative:      The following orders were created for panel order SARS-CoV-2 (COVID-19), PCR Nasopharynx.  Procedure                               Abnormality         Status                     ---------                               -----------         ------                     SARS-COV-2 (COVID-19)/ F...[[id number]]  Normal              Final result                 Please view results for these tests on the individual orders.    SARS-COV-2 (COVID-19)/ FLU A/B, AND RSV, PCR Nasopharynx [[id number]]  (Normal) Collected: 11/28/22 1719    Specimen: Nasopharyngeal Swab from Nasopharynx Updated: 11/28/22 1820     SARS-CoV-2 (COVID-19) Negative     Influenza A Negative     Influenza B Negative     Respiratory Syncytial Virus Negative    Narrative:      Testing performed using real-time PCR for detection of COVID-19. EUA approved validation studies performed on site.             SARS-CoV-2 (COVID-19) (no units)   Date/Time Value   11/28/2022 1719 Negative       Pertinent Imaging  ULTRASOUND PELVIS TRANSVAGINAL ONLY    Result Date: 11/3/2022  IMPRESSION: Unremarkable pelvic ultrasound.     ULTRASOUND PELVIS LIMITED TRANSABDOMINAL ONLY    Result Date: 11/3/2022  IMPRESSION: Unremarkable pelvic ultrasound.     CT ANGIOGRAPHY CHEST PULMONARY EMBOLISM WITH IV CONTRAST    Result Date: 11/29/2022  IMPRESSION: No evidence for pulmonary embolism. No focal consolidation.     ULTRASOUND KIDNEYS    Result Date: 11/3/2022  IMPRESSION: Unremarkable sonographic appearance of the kidneys.    X-RAY CHEST 1 VIEW    Result Date: 11/28/2022  IMPRESSION:    No active disease. COMMENT:    A single AP view of the chest was performed. Comparison: None. The heart size and pulmonary  vasculature are within normal limits. The lung fields are clear, and no pleural effusions are seen. No hilar or mediastinal abnormality is seen. No osseous abnormality is noted. Electrocardiogram leads and wires could obscure small lesions.       OUTPATIENT  FOLLOW-UP / REFERRALS / PENDING TESTS        Outpatient Follow-Up Appointments  Encounter Information    This patient does not currently have any appointments scheduled.         Referrals  No orders of the defined types were placed in this encounter.      Test Results Pending at Discharge  Unresulted Labs (From admission, onward)    None          Important Issues to Address in Follow-Up  F/u with PCP    DISCHARGE DISPOSITION AND DESTINATION      Disposition:    Destination:                              Code Status At Discharge: Full Code    Physician Order for Life-Sustaining Treatment Document Status      No documents found

## 2022-11-29 NOTE — CONSULTS
Inpatient consult to Cardiology  Consult performed by: Leah Parker PA C  Consult ordered by: Yan Locke MD            REASON FOR CONSULT: syncope    CONSULT FROM: Navi Lorenzana MD    PRIMARY CARDIOLOGIST: [facility] cardiology in [place]    ------------------------------------------------------------------------------------------------------------------------------------------  HISTORY OF PRESENTING ILLNESS  ------------------------------------------------------------------------------------------------------------------------------------------  Kyaw Harrison is a 22 y.o. female who is admitted with syncope    # cardiac risk factors: none  # no CAD/CHF  # POTS on propanolol  # orthostatic hypotension on midodrine   # PMH:    [no prior cardiac studies available]    The patient has a history of POTS, she has been managed by a POTS specialist, Dr. Peraza who is in [place]. She sees him every 6 months. She has been managed on propanolol 10mg BID and midodrine 5mg TID (although often only takes BID due to erratic sleep schedule).  She felt like she was doing fairly well until recently, the past few weeks she has had increasing symptoms and several ED visits.  Yesterday, she woke up and vomited several times. She developed a migraine HA. This prompted urgent care visit.  She had a syncopal event while she was at urgent care and was sent to ED.      Since arrival in ED, -140 with stable BP.  Labwork notable for normal renal function and electrolytes, repeat labs this am c/w hemodilution s/p 2L IV fluids.    Orthostatic vitals:  Laying  /58  Sitting  /55  Standing  /54    ------------------------------------------------------------------------------------------------------------------------------------------  PAST MEDICAL HISTORY  ------------------------------------------------------------------------------------------------------------------------------------------  Past  Medical History:   Diagnosis Date   • Anxiety    • Concussion    • Hypoglycemia    • POP (persistent occipitoposterior position)    • POTS (postural orthostatic tachycardia syndrome)      Past Surgical History:   Procedure Laterality Date   • APPENDECTOMY     • SKIN BIOPSY         ------------------------------------------------------------------------------------------------------------------------------------------  MEDICATIONS  ------------------------------------------------------------------------------------------------------------------------------------------  Home medications    Not in a hospital admission.     Inpatient Medications    •  acetaminophen, 650 mg, oral, q4h PRN  •  glucose, 16-32 g of dextrose, oral, PRN **OR** dextrose, 15-30 g of dextrose, oral, PRN **OR** glucagon, 1 mg, intramuscular, PRN **OR** dextrose 50 % in water (D50), 25 mL, intravenous, PRN  •  nortriptyline, 25 mg, oral, Nightly  •  ondansetron ODT, 4 mg, oral, q8h PRN **OR** ondansetron, 4 mg, intravenous, q8h PRN  •  potassium chloride, 20 mEq, oral, PRN  •  potassium chloride, 40 mEq, oral, PRN  •  propranoloL, 10 mg, oral, TID  •  desmopressin  •  JUNEL FE 1/20 (28)  •  midodrine  •  naproxen  •  nortriptyline  •  propranoloL    ------------------------------------------------------------------------------------------------------------------------------------------  ALLERGIES  ------------------------------------------------------------------------------------------------------------------------------------------  Aripiprazole, Iodine, and Sertraline    ------------------------------------------------------------------------------------------------------------------------------------------  SOCIAL HISTORY  ------------------------------------------------------------------------------------------------------------------------------------------  No smoking or  alcohol    ------------------------------------------------------------------------------------------------------------------------------------------  FAMILY HISTORY  ------------------------------------------------------------------------------------------------------------------------------------------  Father has bipolar disorder  Mother has diabetes     ------------------------------------------------------------------------------------------------------------------------------------------  REVIEW OF SYSTEMS  ------------------------------------------------------------------------------------------------------------------------------------------  Constitutional: - fever, - chills, - weakness, - weight loss  HEENT: - blurred vision, - sore throat, - hoarseness  Respiratory: - dyspnea, - cough, - hemoptysis  Cardiovascular: - chest pain, - dyspnea, - orthopnea, - PND, - edema, - palpitations, + syncope  Gastrointestinal: - nausea, + vomiting, - diarrhea, - hematemesis, - melena  Genitourinary: - dysuria, - frequency  Integument: - rash, - itching  Hematologic/lymphatic:  - bruising, - petechiae  Musculoskeletal: - arthalgias, - myalgias  Neurological: - vertigo, - tremors, - headache, - speech deficit, - focal weakness  Behavioral/Psych: - anxiety, - depression  Endocrine: - cold intolerance, - heat intolerance, - weight change    ------------------------------------------------------------------------------------------------------------------------------------------  PHYSICAL EXAM  ------------------------------------------------------------------------------------------------------------------------------------------  VITAL SIGNS:  Temp:  [36.2 °C (97.1 °F)-36.7 °C (98.1 °F)] 36.7 °C (98.1 °F)  Heart Rate:  [116-136] 128  Resp:  [18-20] 18  BP: (100-124)/(57-75) 100/57  SaO2: 94%  No intake or output data in the 24 hours ending 11/29/22 0823    PHYSICAL EXAM:  General appearance: alert and cooperative  Head:  without obvious abnormality  Eyes: PERRLA, extraocular movements intact  Neck: No JVD, carotid bruits, thyromegaly  Lungs: clear to auscultation bilaterally, no crackles or wheezing  Heart: regular rate and rhythm, S1-S2 normal, no murmurs, clicks, rubs or gallops  Abdomen: soft, non-tender, bowel sounds normal  Extremities: no edema, peripheral pulses present  Skin: Skin color, texture, turgor normal. No rashes or lesions  Neurologic: Alert and oriented X 3, no focal deficits    ------------------------------------------------------------------------------------------------------------------------------------------  LABS / IMAGING / EKG / TELEMETRY  ------------------------------------------------------------------------------------------------------------------------------------------  LABS:  Results from last 7 days   Lab Units 22  0355 22  1818 22  1633   SODIUM mEQ/L 135*  --  137   POTASSIUM mEQ/L 3.4*  --  4.1   MAGNESIUM mg/dL 1.8  --  1.9   CHLORIDE mEQ/L 107  --  108   CO2 mEQ/L 19*  --  24   BUN mg/dL 8  --  13   CREATININE mg/dL 0.6  --  0.6   AST IU/L  --   --  17   ALT IU/L  --   --  14   HIGH SENSITIVE TROPONIN I pg/mL  --  <2.0  --      Results from last 7 days   Lab Units 22  0355 22  1633   WBC K/uL 4.98 9.76   HEMOGLOBIN g/dL 11.6* 14.2   HEMATOCRIT % 34.7* 43.4   PLATELETS K/uL 210 241     Lab Results   Component Value Date    HGBA1C 4.9 2021    TSH 1.29 2022     No results found for: CHOL, LDLCALC, HDL, TRIG  No results found for: BNP    IMAGING:  CT chest: NAD     EC2022 16:25 sinus rate 115, normal axis, normal QRS, no ST Segment abnormalities       TELEMETRY:  Bedside telemetry only, sinus tach      ------------------------------------------------------------------------------------------------------------------------------------------  ASSESSMENT AND  PLAN  ------------------------------------------------------------------------------------------------------------------------------------------    Syncope: The patient has a history of POTS. She is mantained on midodrine 5mg TID and propanolol 10mg BID. Her symptoms have been worse recently, and likely acutely exacerbated by migraine HA and vomiting yesterday morning.  She was given IV fluids x 2L in ED last night.  This morning, she remains tachycardiac, worse with standing, but there is no orthostatic hypotension on my exam.      POTS: see above. She has been taking propanolol 10mg BID - recommend increasing to 20mg BID and f/u with her primary cardiologist.      Orthostatic hypotension: Stable, not orthostatic here s/p 2L IV fluids. Continue midodrine same dose.         SANDRA Florentino  11/29/2022    Primary Care Doctor: Tali Ugarte CRNP

## 2022-11-30 ENCOUNTER — TELEPHONE (OUTPATIENT)
Dept: FAMILY MEDICINE | Facility: CLINIC | Age: 22
End: 2022-11-30
Payer: COMMERCIAL

## 2022-11-30 ENCOUNTER — PATIENT OUTREACH (OUTPATIENT)
Dept: CASE MANAGEMENT | Facility: CLINIC | Age: 22
End: 2022-11-30
Payer: COMMERCIAL

## 2022-11-30 NOTE — PROGRESS NOTES
Patient admitted to [facility] 11/28 - 11/29 for syncope and collapse.     NA[facility] outreach #1, no answer. VM left with RN contact information for return call back.  Another outreach attempt will be made at the next business day.    Mikki Yin, RN  Nurse Ambulatory Care Manager  [phone]

## 2022-12-01 NOTE — PROGRESS NOTES
NANI outreach #2, no answer. VM left with RN contact information for return call back.    Mikki Yin, RN  Nurse Ambulatory Care Manager  [phone]

## 2022-12-05 NOTE — PROGRESS NOTES
NAME: Kyaw Harrison    MRN: [id number]    YOB: 2000    Event Review:    Initial TCM Patient Outreach Date: 11/30/22    Discharge Diagnosis: Syncope and collapse    Patient readmitted in the last 30 days: No  Discharging Facility: [facility]  Date of Last Admission: 11/28/22  Date of Last Discharge: 11/29/22    Interventions/ Care Coordination:  Two outreach calls were made within 1-2 days of discharge. Unable to reach patient.     If patient is seen in the office within 14 calendar days, you may bill for a TCM visit.     RN phone number left for future care management needs.    Mikki Yin RN

## 2022-12-06 LAB
HBA1C MFR BLD: 4.8 % (ref 4.8–5.6)
INSULIN SERPL-ACNC: 7.7 UIU/ML (ref 2.6–24.9)

## 2022-12-07 ENCOUNTER — TELEPHONE (OUTPATIENT)
Dept: FAMILY MEDICINE | Facility: CLINIC | Age: 22
End: 2022-12-07

## 2022-12-07 NOTE — TELEPHONE ENCOUNTER
----- Message from GLORIA Hahn sent at 12/6/2022  4:51 PM EST -----  Levels are normal and without concern.

## 2022-12-25 PROBLEM — G90.A POTS (POSTURAL ORTHOSTATIC TACHYCARDIA SYNDROME): Status: ACTIVE | Noted: 2021-08-06

## 2023-02-02 ENCOUNTER — APPOINTMENT (EMERGENCY)
Dept: ULTRASOUND IMAGING | Facility: HOSPITAL | Age: 23
End: 2023-02-02

## 2023-02-02 ENCOUNTER — HOSPITAL ENCOUNTER (EMERGENCY)
Facility: HOSPITAL | Age: 23
Discharge: HOME | End: 2023-02-02
Attending: EMERGENCY MEDICINE

## 2023-02-02 VITALS
OXYGEN SATURATION: 98 % | HEIGHT: 64 IN | HEART RATE: 86 BPM | BODY MASS INDEX: 23.05 KG/M2 | SYSTOLIC BLOOD PRESSURE: 101 MMHG | DIASTOLIC BLOOD PRESSURE: 65 MMHG | WEIGHT: 135 LBS | RESPIRATION RATE: 17 BRPM

## 2023-02-02 DIAGNOSIS — N83.209 HEMORRHAGIC OVARIAN CYST: ICD-10-CM

## 2023-02-02 DIAGNOSIS — E86.0 DEHYDRATION: ICD-10-CM

## 2023-02-02 DIAGNOSIS — R55 SYNCOPE: Primary | ICD-10-CM

## 2023-02-02 DIAGNOSIS — G90.A POTS (POSTURAL ORTHOSTATIC TACHYCARDIA SYNDROME): ICD-10-CM

## 2023-02-02 LAB
ALBUMIN SERPL BCP-MCNC: 3.5 G/DL (ref 3.5–5)
ALP SERPL-CCNC: 59 U/L (ref 46–116)
ALT SERPL W P-5'-P-CCNC: 19 U/L (ref 12–78)
ANION GAP SERPL CALCULATED.3IONS-SCNC: 6 MMOL/L (ref 4–13)
AST SERPL W P-5'-P-CCNC: 13 U/L (ref 5–45)
ATRIAL RATE: 86 BPM
BASOPHILS # BLD AUTO: 0.02 THOUSANDS/ÂΜL (ref 0–0.1)
BASOPHILS NFR BLD AUTO: 0 % (ref 0–1)
BILIRUB SERPL-MCNC: 0.26 MG/DL (ref 0.2–1)
BILIRUB UR QL STRIP: NEGATIVE
BUN SERPL-MCNC: 15 MG/DL (ref 5–25)
CALCIUM SERPL-MCNC: 8.4 MG/DL (ref 8.3–10.1)
CARDIAC TROPONIN I PNL SERPL HS: <2 NG/L
CARDIAC TROPONIN I PNL SERPL HS: <2 NG/L
CHLORIDE SERPL-SCNC: 104 MMOL/L (ref 96–108)
CLARITY UR: CLEAR
CO2 SERPL-SCNC: 30 MMOL/L (ref 21–32)
COLOR UR: COLORLESS
CREAT SERPL-MCNC: 0.68 MG/DL (ref 0.6–1.3)
EOSINOPHIL # BLD AUTO: 0.03 THOUSAND/ÂΜL (ref 0–0.61)
EOSINOPHIL NFR BLD AUTO: 0 % (ref 0–6)
ERYTHROCYTE [DISTWIDTH] IN BLOOD BY AUTOMATED COUNT: 12 % (ref 11.6–15.1)
EXT PREGNANCY TEST URINE: NEGATIVE
EXT. CONTROL: NORMAL
FLUAV RNA RESP QL NAA+PROBE: NEGATIVE
FLUBV RNA RESP QL NAA+PROBE: NEGATIVE
GFR SERPL CREATININE-BSD FRML MDRD: 126 ML/MIN/1.73SQ M
GLUCOSE SERPL-MCNC: 90 MG/DL (ref 65–140)
GLUCOSE UR STRIP-MCNC: NEGATIVE MG/DL
HCT VFR BLD AUTO: 38.2 % (ref 34.8–46.1)
HGB BLD-MCNC: 12.8 G/DL (ref 11.5–15.4)
HGB UR QL STRIP.AUTO: NEGATIVE
IMM GRANULOCYTES # BLD AUTO: 0.02 THOUSAND/UL (ref 0–0.2)
IMM GRANULOCYTES NFR BLD AUTO: 0 % (ref 0–2)
KETONES UR STRIP-MCNC: NEGATIVE MG/DL
LEUKOCYTE ESTERASE UR QL STRIP: NEGATIVE
LIPASE SERPL-CCNC: 111 U/L (ref 73–393)
LYMPHOCYTES # BLD AUTO: 1.63 THOUSANDS/ÂΜL (ref 0.6–4.47)
LYMPHOCYTES NFR BLD AUTO: 21 % (ref 14–44)
MCH RBC QN AUTO: 31.6 PG (ref 26.8–34.3)
MCHC RBC AUTO-ENTMCNC: 33.5 G/DL (ref 31.4–37.4)
MCV RBC AUTO: 94 FL (ref 82–98)
MONOCYTES # BLD AUTO: 0.44 THOUSAND/ÂΜL (ref 0.17–1.22)
MONOCYTES NFR BLD AUTO: 6 % (ref 4–12)
NEUTROPHILS # BLD AUTO: 5.66 THOUSANDS/ÂΜL (ref 1.85–7.62)
NEUTS SEG NFR BLD AUTO: 73 % (ref 43–75)
NITRITE UR QL STRIP: NEGATIVE
NRBC BLD AUTO-RTO: 0 /100 WBCS
P AXIS: 69 DEGREES
PH UR STRIP.AUTO: 7 [PH]
PLATELET # BLD AUTO: 312 THOUSANDS/UL (ref 149–390)
PMV BLD AUTO: 9.2 FL (ref 8.9–12.7)
POTASSIUM SERPL-SCNC: 4.3 MMOL/L (ref 3.5–5.3)
PR INTERVAL: 162 MS
PROT SERPL-MCNC: 7.1 G/DL (ref 6.4–8.4)
PROT UR STRIP-MCNC: NEGATIVE MG/DL
QRS AXIS: 89 DEGREES
QRSD INTERVAL: 82 MS
QT INTERVAL: 364 MS
QTC INTERVAL: 435 MS
RBC # BLD AUTO: 4.05 MILLION/UL (ref 3.81–5.12)
RSV RNA RESP QL NAA+PROBE: NEGATIVE
SARS-COV-2 RNA RESP QL NAA+PROBE: NEGATIVE
SODIUM SERPL-SCNC: 140 MMOL/L (ref 135–147)
SP GR UR STRIP.AUTO: 1.01 (ref 1–1.03)
T WAVE AXIS: 74 DEGREES
UROBILINOGEN UR STRIP-ACNC: <2 MG/DL
VENTRICULAR RATE: 86 BPM
WBC # BLD AUTO: 7.8 THOUSAND/UL (ref 4.31–10.16)

## 2023-02-02 RX ORDER — NAPROXEN 250 MG/1
250 TABLET ORAL 2 TIMES DAILY WITH MEALS
Qty: 20 TABLET | Refills: 0 | Status: SHIPPED | OUTPATIENT
Start: 2023-02-02

## 2023-02-02 RX ORDER — KETOROLAC TROMETHAMINE 30 MG/ML
15 INJECTION, SOLUTION INTRAMUSCULAR; INTRAVENOUS ONCE
Status: COMPLETED | OUTPATIENT
Start: 2023-02-02 | End: 2023-02-02

## 2023-02-02 RX ADMIN — KETOROLAC TROMETHAMINE 15 MG: 30 INJECTION, SOLUTION INTRAMUSCULAR at 16:46

## 2023-02-02 NOTE — ED PROVIDER NOTES
Pt Name: Dylon Rider  MRN: [id number]  [place] 2000  Age/Sex: 23 y o  female  Date of evaluation: 2/2/2023  PCP: No primary care provider on file  CHIEF COMPLAINT    Chief Complaint   Patient presents with   • Syncope     Pt BIBA d/t reports of sequential syncopal episodes  Pt states that she [place], when she sat down and stood back up, which then she had a syncopal episode  Pt states that she did pass out several times  Pt reports a hx of POTS  HPI    23 y o  female presenting with syncopal episodes  Patient does note a history of POTS has had prior syncopal events, states [place], has been feeling lightheaded all day, sat down and then stood back up rapidly causing her to pass out  She then tried to get right back up and passed out again  She states that she still felt woozy and may have passed out again in the ensuing several minutes  W an ambulance was called, patient was brought to the emergency department, given foods on the way with substantial improvement of symptoms  Patient also endorses abdominal pain which been going on over the past 2 weeks, this pain is dull, moderate intensity, in the left lower quadrant, nonradiating, worse with movement better at rest   She states also for ovarian cyst may be more severe  She denies any vaginal bleeding or discharge  She was previously uncontrolled ovarian cysts for a period of about a year, discontinued that about months ago, restarted 2 days ago with concern that may have caused her abdominal pain  She denies fevers, trauma, shortness of breath, nausea, vomiting, diarrhea,, other symptoms  Does note an episode of sharp stabbing chest pain that is moderate intensity yesterday, as well as this morning, both episodes lasted 2 to 3 seconds and were not associated with any activity or other clear provoking  Factors        HPI      Past Medical and Surgical History    Past Medical History:   Diagnosis Date   • POTS (postural orthostatic tachycardia syndrome)        History reviewed  No pertinent surgical history  History reviewed  No pertinent family history  Social History     Tobacco Use   • Smoking status: Never   • Smokeless tobacco: Never   Vaping Use   • Vaping Use: Never used   Substance Use Topics   • Alcohol use: Not Currently           Allergies    Allergies   Allergen Reactions   • Iodine - Food Allergy Itching       Home Medications    Prior to Admission medications    Not on File           Review of Systems    Review of Systems   Constitutional: Negative for activity change, chills and fever  HENT: Negative for drooling and facial swelling  Eyes: Negative for pain, discharge and visual disturbance  Respiratory: Negative for apnea, cough, chest tightness, shortness of breath and wheezing  Cardiovascular: Positive for chest pain  Negative for leg swelling  Gastrointestinal: Positive for abdominal pain  Negative for constipation, diarrhea, nausea and vomiting  Genitourinary: Negative for difficulty urinating, dysuria and urgency  Musculoskeletal: Negative for arthralgias, back pain and gait problem  Skin: Negative for color change and rash  Neurological: Positive for syncope and light-headedness  Negative for dizziness, speech difficulty, weakness and headaches  Psychiatric/Behavioral: Negative for agitation, behavioral problems and confusion  All other systems reviewed and negative  Physical Exam      ED Triage Vitals   Temp Pulse Respirations Blood Pressure SpO2   -- 02/02/23 1604 02/02/23 1604 02/02/23 1604 02/02/23 1604    83 17 106/70 100 %      Temp src Heart Rate Source Patient Position - Orthostatic VS BP Location FiO2 (%)   -- 02/02/23 1604 02/02/23 1604 02/02/23 1604 --    Monitor Lying Right arm       Pain Score       02/02/23 1646       6               Physical Exam  Vitals and nursing note reviewed  Constitutional:       General: She is not in acute distress  Appearance: She is well-developed  She is not ill-appearing, toxic-appearing or diaphoretic  HENT:      Head: Normocephalic and atraumatic  Right Ear: External ear normal       Left Ear: External ear normal       Nose: Nose normal  No congestion or rhinorrhea  Mouth/Throat:      Mouth: Mucous membranes are dry  Pharynx: Oropharynx is clear  Eyes:      Conjunctiva/sclera: Conjunctivae normal       Pupils: Pupils are equal, round, and reactive to light  Cardiovascular:      Rate and Rhythm: Normal rate and regular rhythm  Pulses: Normal pulses  Heart sounds: Normal heart sounds  No murmur heard  No friction rub  No gallop  Pulmonary:      Effort: Pulmonary effort is normal  No respiratory distress  Breath sounds: Normal breath sounds  No wheezing or rales  Abdominal:      General: There is no distension  Palpations: Abdomen is soft  Tenderness: There is no abdominal tenderness  There is no guarding or rebound  Musculoskeletal:         General: No deformity  Normal range of motion  Cervical back: Normal range of motion and neck supple  No rigidity or tenderness  Right lower leg: No edema  Left lower leg: No edema  Skin:     General: Skin is warm and dry  Capillary Refill: Capillary refill takes less than 2 seconds  Findings: No erythema or rash  Neurological:      General: No focal deficit present  Mental Status: She is alert and oriented to person, place, and time  Cranial Nerves: No cranial nerve deficit  Motor: No weakness  Coordination: Coordination normal       Gait: Gait normal    Psychiatric:         Behavior: Behavior normal          Thought Content:  Thought content normal          Judgment: Judgment normal               Diagnostic Results    EKG Interpretation    Rate:  86  BPM  Rhythm:  Normal Sinus Rhythm   Axis:  Normal   Intervals: Normal, no blocks, QTc  435 ms  Q waves:  No pathologic Q waves   T waves:  Normal   ST segments: Global mild elevations consistent with early repolarization    Impression:  Normal sinus rhythm with evidence of early repolarization but without evidence of acute ischemia or significant arrhythmia      EKG for comparison: None available    EKG interpreted by me  Labs:    Results Reviewed     Procedure Component Value Units Date/Time    HS Troponin I 2hr [[id number]] Collected: 02/02/23 1922    Lab Status: Final result Specimen: Blood from Arm, Right Updated: 02/02/23 1953     hs TnI 2hr <2 ng/L      Delta 2hr hsTnI --    HS Troponin I 4hr [[id number]]     Lab Status: No result Specimen: Blood     FLU/RSV/COVID - if FLU/RSV clinically relevant [[id number]]  (Normal) Collected: 02/02/23 1645    Lab Status: Final result Specimen: Nares from Nose Updated: 02/02/23 1730     SARS-CoV-2 Negative     INFLUENZA A PCR Negative     INFLUENZA B PCR Negative     RSV PCR Negative    Narrative:      FOR PEDIATRIC PATIENTS - copy/paste COVID Guidelines URL to browser: [email or web address]  [email or web address]    SARS-CoV-2 assay is a Nucleic Acid Amplification assay intended for the  qualitative detection of nucleic acid from SARS-CoV-2 in nasopharyngeal  swabs  Results are for the presumptive identification of SARS-CoV-2 RNA  Positive results are indicative of infection with SARS-CoV-2, the virus  causing COVID-19, but do not rule out bacterial infection or co-infection  with other viruses  Laboratories within the [place] and its  territories are required to report all positive results to the appropriate  public health authorities  Negative results do not preclude SARS-CoV-2  infection and should not be used as the sole basis for treatment or other  patient management decisions   Negative results must be combined with  clinical observations, patient history, and epidemiological information  This test has not been FDA cleared or approved  This test has been authorized by FDA under an Emergency Use Authorization  (EUA)  This test is only authorized for the duration of time the  declaration that circumstances exist justifying the authorization of the  emergency use of an in vitro diagnostic tests for detection of SARS-CoV-2  virus and/or diagnosis of COVID-19 infection under section 564(b)(1) of  the Act, 21 U  S C  857UGB-3(N)(3), unless the authorization is terminated  or revoked sooner  The test has been validated but independent review by FDA  and CLIA is pending  Test performed using [employer]pert: This RT-PCR assay targets N2,  a region unique to SARS-CoV-2  A conserved region in the E-gene was chosen  for pan-Sarbecovirus detection which includes SARS-CoV-2  According to CMS-2020-01-R, this platform meets the definition of high-throughput technology      HS Troponin 0hr (reflex protocol) [[id number]]  (Normal) Collected: 02/02/23 1639    Lab Status: Final result Specimen: Blood from Arm, Right Updated: 02/02/23 1713     hs TnI 0hr <2 ng/L     Comprehensive metabolic panel [[id number]] Collected: 02/02/23 1639    Lab Status: Final result Specimen: Blood from Arm, Right Updated: 02/02/23 1705     Sodium 140 mmol/L      Potassium 4 3 mmol/L      Chloride 104 mmol/L      CO2 30 mmol/L      ANION GAP 6 mmol/L      BUN 15 mg/dL      Creatinine 0 68 mg/dL      Glucose 90 mg/dL      Calcium 8 4 mg/dL      AST 13 U/L      ALT 19 U/L      Alkaline Phosphatase 59 U/L      Total Protein 7 1 g/dL      Albumin 3 5 g/dL      Total Bilirubin 0 26 mg/dL      eGFR 126 ml/min/1 73sq m     Narrative:      [place] guidelines for Chronic Kidney Disease (CKD):   •  Stage 1 with normal or high GFR (GFR > 90 mL/min/1 73 square meters)  •  Stage 2 Mild CKD (GFR = 60-89 mL/min/1 73 square meters)  •  Stage 3A Moderate CKD (GFR = 45-59 mL/min/1 73 square meters)  •  Stage 3B Moderate CKD (GFR = 30-44 mL/min/1 73 square meters)  •  Stage 4 Severe CKD (GFR = 15-29 mL/min/1 73 square meters)  •  Stage 5 End Stage CKD (GFR <15 mL/min/1 73 square meters)  Note: GFR calculation is accurate only with a steady state creatinine    Lipase [[id number]]  (Normal) Collected: 02/02/23 1639    Lab Status: Final result Specimen: Blood from Arm, Right Updated: 02/02/23 1705     Lipase 111 u/L     UA w Reflex to Microscopic w Reflex to Culture [[id number]] Collected: 02/02/23 1640    Lab Status: Final result Specimen: Urine, Clean Catch Updated: 02/02/23 1702     Color, UA Colorless     Clarity, UA Clear     Specific Gravity, UA 1 006     pH, UA 7 0     Leukocytes, UA Negative     Nitrite, UA Negative     Protein, UA Negative mg/dl      Glucose, UA Negative mg/dl      Ketones, UA Negative mg/dl      Urobilinogen, UA <2 0 mg/dl      Bilirubin, UA Negative     Occult Blood, UA Negative    CBC and differential [[id number]] Collected: 02/02/23 1639    Lab Status: Final result Specimen: Blood from Arm, Right Updated: 02/02/23 1647     WBC 7 80 Thousand/uL      RBC 4 05 Million/uL      Hemoglobin 12 8 g/dL      Hematocrit 38 2 %      MCV 94 fL      MCH 31 6 pg      MCHC 33 5 g/dL      RDW 12 0 %      MPV 9 2 fL      Platelets 334 Thousands/uL      nRBC 0 /100 WBCs      Neutrophils Relative 73 %      Immat GRANS % 0 %      Lymphocytes Relative 21 %      Monocytes Relative 6 %      Eosinophils Relative 0 %      Basophils Relative 0 %      Neutrophils Absolute 5 66 Thousands/µL      Immature Grans Absolute 0 02 Thousand/uL      Lymphocytes Absolute 1 63 Thousands/µL      Monocytes Absolute 0 44 Thousand/µL      Eosinophils Absolute 0 03 Thousand/µL      Basophils Absolute 0 02 Thousands/µL     POCT pregnancy, urine [[id number]]  (Normal) Resulted: 02/02/23 1640    Lab Status: Final result Updated: 02/02/23 1640     EXT Preg Test, Ur Negative     Control Valid          All labs reviewed and utilized in the medical decision making process    Radiology:    US pelvis complete w transvaginal   Final Result       No evidence of ovarian torsion  2 2 cm probable left ovarian hemorrhagic cyst                      Workstation performed: [id number]             All radiology studies independently viewed by me and interpreted by the radiologist     Procedure    Procedures        ED Course of Care and Re-Assessments      Symptoms improved with fluids and Toradol, labs sent and ultrasound performed with concern for possible ovarian torsion, returned reassuring  Medications   ketorolac (TORADOL) injection 15 mg (15 mg Intravenous Given 2/2/23 1646)       HEART score:    History 0=Slightly or non-suspicious   ECG 0=Normal   Age 0= < 45 years   Risk Factors 1= 1 or 2 risk factors   Troponin 0= Less than or equal to 12 ng/L   Total 1            FINAL IMPRESSION    Final diagnoses:   Syncope   Dehydration   POTS (postural orthostatic tachycardia syndrome)   Hemorrhagic ovarian cyst         DISPOSITION/PLAN    Presentation as above with syncope in the setting of POTS and dehydration as well as abdominal pain felt most likely due to hemorrhagic ovarian cyst   Vital signs are reassuring, examination nonspecific with nonfocal neurologic exam   Patient received fluid resuscitation from EMS with substantial improvement of symptoms prior to arrival   Suspect syncope is due to postural hypotension from POTS potentiated by dehydration in the setting of poor p o  intake and activity in a heated water park  Abdominal pain likely due to hemorrhagic ovarian cyst, low suspicion for ovarian torsion, PID, TOA  Low suspicion for ACS, PE, aortic dissection, bacterial pneumonia, pneumothorax, other acute life-threatening alternate pathology at this time  Treated symptomatically, discharged return precautions, follow-up primary care doctor and OB/GYN    Time reflects when diagnosis was documented in both MDM as applicable and the Disposition within this note     Time User Action Codes Description Comment    2/2/2023  8:27 PM Barron Alissa Add [R55] Syncope     2/2/2023  8:27 PM Barron Alissa Add [E86 0] Dehydration     2/2/2023  8:27 PM Barron Alissa Add [G90  A] POTS (postural orthostatic tachycardia syndrome)     2/2/2023  8:28 PM Harvis Sol T Add [V69 293] Hemorrhagic ovarian cyst       ED Disposition     ED Disposition   Discharge    Condition   Stable    Date/Time   Thu Feb 2, 2023  8:27 PM    Comment   [facility] discharge to home/self care  Follow-up Information     Follow up With Specialties Details Why Contact Info Additional [address] [facility] Emergency Department Emergency Medicine Go to  If symptoms worsen [facility] [address] Emergency Department, [address] [place] [address], [facility]    Your OB/GYN doctor  Call in 1 day To discuss this visit and schedule close patient follow-up      Your primary care doctor  Call in 1 day To discuss this visit and schedule close patient follow-up              [facility]  [address] [facility] [address] [phone]  Go to   If symptoms worsen    Your OB/GYN doctor    Call in 1 day  To discuss this visit and schedule close patient follow-up    Your primary care doctor    Call in 1 day  To discuss this visit and schedule close patient follow-up      DISCHARGE MEDICATIONS:    Discharge Medication List as of 2/2/2023  8:30 PM      START taking these medications    Details   naproxen (NAPROSYN) 250 mg tablet Take 1 tablet (250 mg total) by mouth 2 (two) times a day with meals, Starting Thu 2/2/2023, Print             No discharge procedures on file  Zach Martinez MD    Portions of the record may have been created with voice recognition software    Occasional wrong word or "sound alike" substitutions may have occurred due to the inherent limitations of voice recognition software    Please read the chart carefully and recognize, using context, where substitutions have occurred     Ema Linares MD  02/02/23 [id number]

## 2023-05-06 NOTE — ASSESSMENT & PLAN NOTE
Endocrine evaluation a few times   No clear etiology   + FMH DM   Encouraged to eat small frequent meals with carbs, fat and protein avoid pure carb    Javi Hyatt  GASTROENTEROLOGY  [address] [place] [address]  Phone: [phone]  Fax: [phone]  Follow Up Time:     Celso Calix (MD; PhD)  Cardiology; Internal Medicine; Vascular Medicine  [address] [place] [address], [address]  [place] [address]  Phone: [phone]  Fax: [phone]  Follow Up Time:    Javi Hyatt  GASTROENTEROLOGY  [address] [place] [address]  Phone: [phone]  Fax: [phone]  Follow Up Time:     Celso Calix (MD; PhD)  Cardiology; Internal Medicine; Vascular Medicine  [address] [place] [address], [address]  [place] [address]  Phone: [phone]  Fax: [phone]  Follow Up Time:     Layne Dent (DO)  Gastroenterology; Transplant Hepatology  [address] [place] [address]  Phone: [phone]  Fax: [phone]  Follow Up Time:

## 2023-05-25 ENCOUNTER — OFFICE VISIT (OUTPATIENT)
Dept: FAMILY MEDICINE | Facility: CLINIC | Age: 23
End: 2023-05-25
Payer: COMMERCIAL

## 2023-05-25 VITALS
SYSTOLIC BLOOD PRESSURE: 106 MMHG | OXYGEN SATURATION: 98 % | DIASTOLIC BLOOD PRESSURE: 72 MMHG | BODY MASS INDEX: 25.92 KG/M2 | HEART RATE: 72 BPM | RESPIRATION RATE: 18 BRPM | WEIGHT: 151 LBS | TEMPERATURE: 97.7 F

## 2023-05-25 DIAGNOSIS — Q79.60 EHLERS-DANLOS SYNDROME: ICD-10-CM

## 2023-05-25 DIAGNOSIS — Z00.00 ENCOUNTER FOR GENERAL ADULT MEDICAL EXAMINATION WITHOUT ABNORMAL FINDINGS: Primary | ICD-10-CM

## 2023-05-25 DIAGNOSIS — Z11.1 TUBERCULOSIS SCREENING: ICD-10-CM

## 2023-05-25 DIAGNOSIS — G90.A POTS (POSTURAL ORTHOSTATIC TACHYCARDIA SYNDROME): ICD-10-CM

## 2023-05-25 DIAGNOSIS — B02.9 HERPES ZOSTER WITHOUT COMPLICATION: ICD-10-CM

## 2023-05-25 PROCEDURE — 3008F BODY MASS INDEX DOCD: CPT | Performed by: FAMILY MEDICINE

## 2023-05-25 PROCEDURE — 99395 PREV VISIT EST AGE 18-39: CPT | Performed by: FAMILY MEDICINE

## 2023-05-25 RX ORDER — CLONAZEPAM 0.5 MG/1
0.5 TABLET ORAL DAILY PRN
COMMUNITY
Start: 2023-02-21

## 2023-05-25 RX ORDER — ERENUMAB-AOOE 70 MG/ML
INJECTION SUBCUTANEOUS
COMMUNITY
Start: 2023-05-04 | End: 2024-07-28

## 2023-05-25 RX ORDER — VALACYCLOVIR HYDROCHLORIDE 1 G/1
1000 TABLET, FILM COATED ORAL 3 TIMES DAILY
Qty: 21 TABLET | Refills: 0 | Status: SHIPPED | OUTPATIENT
Start: 2023-05-25 | End: 2023-06-02

## 2023-05-25 ASSESSMENT — ENCOUNTER SYMPTOMS
WHEEZING: 0
DIFFICULTY URINATING: 0
APPETITE CHANGE: 0
CHEST TIGHTNESS: 0
DIZZINESS: 0
ARTHRALGIAS: 0
NAUSEA: 0
FEVER: 0
ABDOMINAL PAIN: 0
DIARRHEA: 0
LIGHT-HEADEDNESS: 0
RHINORRHEA: 0
FATIGUE: 0
SINUS PRESSURE: 0
VOMITING: 0
BACK PAIN: 0
COLOR CHANGE: 0
SORE THROAT: 0
HEADACHES: 0
SHORTNESS OF BREATH: 0

## 2023-05-25 ASSESSMENT — PATIENT HEALTH QUESTIONNAIRE - PHQ9: SUM OF ALL RESPONSES TO PHQ9 QUESTIONS 1 & 2: 0

## 2023-05-25 NOTE — PROGRESS NOTES
[facility]  [address] [place] [address]  [phone]     Reason for visit:   Chief Complaint   Patient presents with   • Annual Exam     PEedis   Kyaw Harrison is a 23 y.o. female who presents for a routine well visit.        Employed- pre-  Fun activities - staying active, cooking, hanging out with friends  Ever feel down/depressed - no   SI/HI - no  Diet - 3 well balanced meals a day including fruits, vegetables, carbohydrates, and protein.  Menses - regular  Sleep - sleeping well  Last dental visit - routine  Ophthalmology - n/a  Mammogram - No risk factors. Screening starts at 44yo.  Colonoscopy - No risk factors. Screening starts at 44yo.  Cervical Cancer Screening - 01/2023    Social History     Socioeconomic History   • Marital status: Single     Spouse name: Not on file   • Number of children: Not on file   • Years of education: Not on file   • Highest education level: Not on file   Occupational History   • Not on file   Tobacco Use   • Smoking status: Never   • Smokeless tobacco: Never   Vaping Use   • Vaping status: Never Used   Substance and Sexual Activity   • Alcohol use: Never   • Drug use: Never   • Sexual activity: Never   Other Topics Concern   • Not on file   Social History Narrative   • Not on file     Social Determinants of Health     Financial Resource Strain: Not on file   Food Insecurity: No Food Insecurity (11/28/2022)    Hunger Vital Sign    • Worried About Running Out of Food in the Last Year: Never true    • Ran Out of Food in the Last Year: Never true   Transportation Needs: Not on file   Physical Activity: Not on file   Stress: Not on file   Social Connections: Not on file   Intimate Partner Violence: Not on file   Housing Stability: Not on file        Medical History:  Past Medical History:   Diagnosis Date   • Anxiety    • Concussion    • Margo-Danlos syndrome    • Hypoglycemia    • POP (persistent occipitoposterior  position)    • POTS (postural orthostatic tachycardia syndrome)    • PTSD (post-traumatic stress disorder)        Surgical History:  Past Surgical History:   Procedure Laterality Date   • APPENDECTOMY     • SKIN BIOPSY           Family History:  Family History   Problem Relation Age of Onset   • Diabetes Biological Mother    • Bipolar disorder Biological Father    • JOSE RAUL disease Biological Father    • No Known Problems Biological Sister         gastroparesis unknown    • Kidney disease Maternal Grandmother    • Hyperlipidemia Maternal Grandmother    • Diabetes Maternal Grandfather    • Heart disease Maternal Grandfather    • Lupus Paternal Grandmother    • Thyroid disease Paternal Grandmother    • Hydrocephalus Paternal Grandmother    • Bipolar disorder Paternal Grandmother    • Atrial fibrillation Paternal Grandfather        Allergies:  Aripiprazole, Iodine, and Sertraline    Current Medications:  Current Outpatient Medications   Medication Sig Dispense Refill   • AIMOVIG AUTOINJECTOR 70 mg/mL auto-injector subcutaneous injection      • clonazePAM (klonoPIN) 0.5 mg tablet Take 0.5 mg by mouth daily as needed.     • JUNEL FE 1/20, 28, 1 mg-20 mcg (21)/75 mg (7) per tablet      • midodrine (PROAMATINE) 5 mg tablet Take 5 mg by mouth.     • nortriptyline (PAMELOR) 25 mg capsule TAKE 1 CAPSULE BY MOUTH EVERYDAY AT BEDTIME     • valACYclovir (VALTREX) 1 gram tablet Take 1 tablet (1,000 mg total) by mouth 3 (three) times a day for 7 days. 21 tablet 0   • desmopressin (DDAVP) 0.1 mg tablet Take by mouth.     • naproxen (NAPROSYN) 500 mg tablet Take 1 tablet (500 mg total) by mouth 2 (two) times a day with meals. 60 tablet 0   • ondansetron ODT (ZOFRAN-ODT) 4 mg disintegrating tablet Take 1 tablet (4 mg total) by mouth every 8 (eight) hours as needed for nausea or vomiting for up to 5 days. 10 tablet 0   • pantoprazole (PROTONIX) 40 mg EC tablet Take 1 tablet (40 mg total) by mouth daily for 10 days Indications:  gastroesophageal reflux disease. 10 tablet 0   • propranoloL (INDERAL) 10 mg tablet Take 2 tablets (20 mg total) by mouth 2 (two) times a day. 120 tablet 0     No current facility-administered medications for this visit.       Review of Systems:  Review of Systems   Constitutional: Negative for appetite change, fatigue and fever.   HENT: Negative for hearing loss, rhinorrhea, sinus pressure and sore throat.    Eyes: Negative for visual disturbance.   Respiratory: Negative for chest tightness, shortness of breath and wheezing.    Cardiovascular: Negative for chest pain.   Gastrointestinal: Negative for abdominal pain, diarrhea, nausea and vomiting.   Genitourinary: Negative for difficulty urinating.   Musculoskeletal: Negative for arthralgias and back pain.   Skin: Positive for rash. Negative for color change.   Neurological: Negative for dizziness, light-headedness and headaches.   Psychiatric/Behavioral: Negative for behavioral problems.       Objective     Vital Signs:  Vitals:    05/25/23 1436   BP: 106/72   Pulse: 72   Resp: 18   Temp: 36.5 °C (97.7 °F)   SpO2: 98%       BMI:  Body mass index is 25.92 kg/m².     Physical Exam  Vitals and nursing note reviewed.   Constitutional:       Appearance: She is well-developed.   HENT:      Head: Normocephalic and atraumatic.      Right Ear: Tympanic membrane, ear canal and external ear normal.      Left Ear: Tympanic membrane, ear canal and external ear normal.   Eyes:      Pupils: Pupils are equal, round, and reactive to light.   Cardiovascular:      Rate and Rhythm: Normal rate and regular rhythm.      Heart sounds: Normal heart sounds. No murmur heard.     No friction rub.   Pulmonary:      Effort: Pulmonary effort is normal. No respiratory distress.      Breath sounds: Normal breath sounds. No wheezing or rales.   Abdominal:      General: Bowel sounds are normal.      Palpations: Abdomen is soft.      Tenderness: There is no abdominal tenderness. There is no  guarding.   Musculoskeletal:         General: Normal range of motion.      Cervical back: Normal range of motion and neck supple.      Right lower leg: No edema.      Left lower leg: No edema.   Lymphadenopathy:      Cervical: No cervical adenopathy.   Skin:     General: Skin is warm and dry.      Findings: Rash present.             Comments: Linear orientation of rash with healing blisters.   Neurological:      Mental Status: She is alert and oriented to person, place, and time.   Psychiatric:         Behavior: Behavior normal.         Recent labs before today:     Lab Results   Component Value Date    WBC 4.98 11/29/2022    HGB 11.6 (L) 11/29/2022    HCT 34.7 (L) 11/29/2022     11/29/2022    ALT 14 11/28/2022    AST 17 11/28/2022     (L) 11/29/2022    K 3.4 (L) 11/29/2022     11/29/2022    CREATININE 0.6 11/29/2022    BUN 8 11/29/2022    CO2 19 (L) 11/29/2022    TSH 1.29 11/28/2022    HGBA1C 4.8 12/05/2022        Procedures   Assessment     There are no Patient Instructions on file for this visit.    Problem List Items Addressed This Visit        Circulatory    POTS (postural orthostatic tachycardia syndrome)     Continue to monitor            Musculoskeletal    Margo-Danlos syndrome     Stable. Cont to monitor        Other Visit Diagnoses     Encounter for general adult medical examination without abnormal findings    -  Primary    Relevant Orders    Comprehensive metabolic panel    CBC and Differential    Lipid panel    Tuberculosis screening        Relevant Orders    QuantiFERON-TB    Herpes zoster without complication        pt states the rash burns and painful. start valtrex TID for 7 days. fup with dermatology if sx do not improve    Relevant Medications    valACYclovir (VALTREX) 1 gram tablet              Today we discussed a healthy diet with fruits, vegetables, and low-fat items with a focus on complex carbohydrates. Regular physical activity is encouraged with a goal of 30 minutes of  cardio most days of the week with some muscle strengthening.    Try to limit caffeine to less than 24 ounces per day, replenish with plenty of water. Hydration has a bigger impact on your health than you think!    Reviewed safe alcohol habits. If you drink while out of the house, plan for a designated . Never drink and drive. Avoid THC use, smoking, or vaping.    Reviewed safe sexual practices, if sexually active always use a condom to discourage STD transmission.     Routine assessments by specialists such as OBGYN, cardiology, GI, etc. were encouraged if applicable.     Watch for changes in bowel habits, especially black or bloody stools.     Patient is UTD with immunizations.  Encouraged yearly flu shot in Oct/Nov.     Fasting labs, follow up One year for annual check up.          Phu Vega, DO  5/25/2023

## 2023-06-02 ENCOUNTER — TELEPHONE (OUTPATIENT)
Dept: FAMILY MEDICINE | Facility: CLINIC | Age: 23
End: 2023-06-02
Payer: COMMERCIAL

## 2023-06-02 NOTE — TELEPHONE ENCOUNTER
Spoke with pt. She was seen in office on 5/25/23 and dx with shingles and prescribed Valtrex. States initial rash was on right leg and is starting to get better but she does have new blisters on right leg and also on left foot. Painful and itchy. She is also having fatigue and dizziness/foggy and wondering if r/t Valtrex and if she should continue.  Also unsure that she has shingles given presentation of rash on opposite leg. She was initially seen at urgent care and states a culture was done which was negative her herpes. Please advise. No appts available.

## 2023-06-02 NOTE — TELEPHONE ENCOUNTER
Pt called wanting to be advise if she should continue taking  that Valacyclovir 1 gram. Please can someone reach out?  Pt is trying to find out what Is causing fatigue, rash and fogginess.

## 2023-06-02 NOTE — TELEPHONE ENCOUNTER
If rash is now on the contralateral leg it is unlikely shingles.  Dr. Vega plan on having patient follow-up with dermatology if rash has not improved.  Recommend referral to Dr. Oscar out of [place] or Dr. Santiago out of [place].    ED visit reviewed from a few days ago.  If patient feels that her symptoms are worsening, she should return to emergency department.

## 2023-06-05 ENCOUNTER — OFFICE VISIT (OUTPATIENT)
Dept: FAMILY MEDICINE | Facility: CLINIC | Age: 23
End: 2023-06-05
Payer: COMMERCIAL

## 2023-06-05 VITALS
OXYGEN SATURATION: 99 % | SYSTOLIC BLOOD PRESSURE: 116 MMHG | WEIGHT: 151 LBS | RESPIRATION RATE: 18 BRPM | DIASTOLIC BLOOD PRESSURE: 78 MMHG | HEART RATE: 116 BPM | BODY MASS INDEX: 25.92 KG/M2

## 2023-06-05 DIAGNOSIS — R53.83 OTHER FATIGUE: Primary | ICD-10-CM

## 2023-06-05 PROCEDURE — 3008F BODY MASS INDEX DOCD: CPT | Performed by: NURSE PRACTITIONER

## 2023-06-05 PROCEDURE — 99214 OFFICE O/P EST MOD 30 MIN: CPT | Performed by: NURSE PRACTITIONER

## 2023-06-05 ASSESSMENT — ENCOUNTER SYMPTOMS
SHORTNESS OF BREATH: 1
VOICE CHANGE: 0
FEVER: 0
DIFFICULTY URINATING: 0
COUGH: 0
JOINT SWELLING: 0
HEADACHES: 1
VOMITING: 0
CHILLS: 0
SORE THROAT: 1
FATIGUE: 1
NAUSEA: 1
MYALGIAS: 1
DIAPHORESIS: 1
UNEXPECTED WEIGHT CHANGE: 0
DIARRHEA: 1
TROUBLE SWALLOWING: 0
ABDOMINAL PAIN: 0

## 2023-06-05 NOTE — ASSESSMENT & PLAN NOTE
Repeat thyroid studies, as well as lyme / mono / autoimmune workup  Stay hydrated, healthy diet, adequate sleep  May continue using topical steroid if helpful; do not change other topical agents (detergent, etc)  Strict ED precautions if new or worsening sxs

## 2023-06-05 NOTE — PROGRESS NOTES
[facility]  [address] [place] [address]  [phone]          Kyaw Harrison is a 23 y.o. female who presents with ER f/u  Chief Complaint   Patient presents with   • Follow-up     Fatigue, weak, sore throat, rash         May 31 - [place] ED  TSH low, otherwise neg workup (strep/covid/urine/labs)  Sxs generally unchanged:    Fatigue    SOB w exertion    Achy  muscles    Sore throat    R elbow swollen, painful (now resolved, no injury)    Rash -- RLE somewhat improved w topical steroid, still itchy/painful; now getting new spots on L ankle and R wrist; lesion cx neg for hsv  5/25 - given valtrex by Dr. Vega for suspected shingles on RLE  Thinks sxs r/t this medication? (forgetful, foggy, hands shaky, fatigue)  DC valtrex as of 4 days ago      Medical History:  Past Medical History:   Diagnosis Date   • Anxiety    • Concussion    • Margo-Danlos syndrome    • Hypoglycemia    • POP (persistent occipitoposterior position)    • POTS (postural orthostatic tachycardia syndrome)    • PTSD (post-traumatic stress disorder)        Surgical History:  Past Surgical History:   Procedure Laterality Date   • APPENDECTOMY     • SKIN BIOPSY         Social History:  Social History     Social History Narrative   • Not on file       Family History:  Family History   Problem Relation Age of Onset   • Diabetes Biological Mother    • Bipolar disorder Biological Father    • JOSE RAUL disease Biological Father    • No Known Problems Biological Sister         gastroparesis unknown    • Kidney disease Maternal Grandmother    • Hyperlipidemia Maternal Grandmother    • Diabetes Maternal Grandfather    • Heart disease Maternal Grandfather    • Lupus Paternal Grandmother    • Thyroid disease Paternal Grandmother    • Hydrocephalus Paternal Grandmother    • Bipolar disorder Paternal Grandmother    • Atrial fibrillation Paternal Grandfather        Allergies:  Aripiprazole, Iodine, and Sertraline    Current  Medications:  Current Outpatient Medications   Medication Sig Dispense Refill   • AIMOVIG AUTOINJECTOR 70 mg/mL auto-injector subcutaneous injection      • clonazePAM (klonoPIN) 0.5 mg tablet Take 0.5 mg by mouth daily as needed.     • desmopressin (DDAVP) 0.1 mg tablet Take by mouth.     • JUNEL FE 1/20, 28, 1 mg-20 mcg (21)/75 mg (7) per tablet      • midodrine (PROAMATINE) 5 mg tablet Take 5 mg by mouth.     • nortriptyline (PAMELOR) 25 mg capsule TAKE 1 CAPSULE BY MOUTH EVERYDAY AT BEDTIME     • naproxen (NAPROSYN) 500 mg tablet Take 1 tablet (500 mg total) by mouth 2 (two) times a day with meals. 60 tablet 0   • ondansetron ODT (ZOFRAN-ODT) 4 mg disintegrating tablet Take 1 tablet (4 mg total) by mouth every 8 (eight) hours as needed for nausea or vomiting for up to 5 days. 10 tablet 0   • propranoloL (INDERAL) 10 mg tablet Take 2 tablets (20 mg total) by mouth 2 (two) times a day. 120 tablet 0     No current facility-administered medications for this visit.       Review of Systems:  Review of Systems   Constitutional: Positive for diaphoresis and fatigue. Negative for chills, fever and unexpected weight change.   HENT: Positive for sore throat. Negative for trouble swallowing and voice change.    Respiratory: Positive for shortness of breath. Negative for cough.    Cardiovascular: Positive for chest pain (with activity).   Gastrointestinal: Positive for diarrhea and nausea. Negative for abdominal pain and vomiting.   Genitourinary: Negative for difficulty urinating.   Musculoskeletal: Positive for myalgias. Negative for joint swelling.   Skin: Positive for rash.   Neurological: Positive for headaches. Negative for syncope.       Objective     Vital Signs:  Vitals:    06/05/23 1543   BP: 116/78   BP Location: Left upper arm   Patient Position: Sitting   Pulse: (!) 116   Resp: 18   SpO2: 99%   Weight: 68.5 kg (151 lb)       BMI:  Body mass index is 25.92 kg/m².     Physical Exam  Vitals reviewed.    Constitutional:       General: She is not in acute distress.     Appearance: Normal appearance. She is not toxic-appearing.   HENT:      Head: Normocephalic and atraumatic.      Right Ear: Tympanic membrane normal.      Left Ear: Tympanic membrane normal.      Nose: Nose normal.      Mouth/Throat:      Mouth: Mucous membranes are moist.      Pharynx: Posterior oropharyngeal erythema present. No oropharyngeal exudate.      Tonsils: No tonsillar exudate. 1+ on the right. 1+ on the left.   Eyes:      Extraocular Movements: Extraocular movements intact.   Cardiovascular:      Rate and Rhythm: Normal rate and regular rhythm.      Heart sounds: No murmur heard.  Pulmonary:      Effort: Pulmonary effort is normal.      Breath sounds: Normal breath sounds.   Musculoskeletal:         General: Normal range of motion.      Cervical back: Neck supple. No tenderness.      Right lower leg: No edema.      Left lower leg: No edema.   Lymphadenopathy:      Cervical: No cervical adenopathy.   Skin:     General: Skin is warm and dry.      Findings: Rash present.          Neurological:      General: No focal deficit present.      Mental Status: She is alert and oriented to person, place, and time.   Psychiatric:         Thought Content: Thought content normal.          Procedures   Assessment     There are no Patient Instructions on file for this visit.  Problem List Items Addressed This Visit        Other    Other fatigue - Primary     Repeat thyroid studies, as well as lyme / mono / autoimmune workup  Stay hydrated, healthy diet, adequate sleep  May continue using topical steroid if helpful; do not change other topical agents (detergent, etc)  Strict ED precautions if new or worsening sxs         Relevant Orders    TSH w reflex FT4    T3, free    Andres-Barr virus antibody panel    Lyme Abs Total W/Reflex WB    Sedimentation rate    C-reactive protein    BELLA Screen (Automated)          The total time spent ON THE DAY OF THE VISIT  was 30 minutes, including preparing to see the patient, obtaining and reviewing separately obtained history, performing medically appropriate examination or evaluation, counseling and educating patient/family/caregiver, ordering medications, tests or procedures, referring and communicating with other health care professionals, documenting clinical information in electronic or other record, independently interpreting and communicating results to patient/family/caregiver, and/or care coordination.           GLORIA Mckeon  6/5/2023

## 2023-06-10 LAB
ALBUMIN SERPL-MCNC: 4.4 G/DL (ref 3.9–5)
ALBUMIN/GLOB SERPL: 1.6 {RATIO} (ref 1.2–2.2)
ALP SERPL-CCNC: 67 IU/L (ref 44–121)
ALT SERPL-CCNC: 10 IU/L (ref 0–32)
ANA SER QL IF: NEGATIVE
AST SERPL-CCNC: 15 IU/L (ref 0–40)
BASOPHILS # BLD AUTO: 0 X10E3/UL (ref 0–0.2)
BASOPHILS NFR BLD AUTO: 1 %
BILIRUB SERPL-MCNC: 0.3 MG/DL (ref 0–1.2)
BUN SERPL-MCNC: 13 MG/DL (ref 6–20)
BUN/CREAT SERPL: 17 (ref 9–23)
CALCIUM SERPL-MCNC: 9.6 MG/DL (ref 8.7–10.2)
CHLORIDE SERPL-SCNC: 103 MMOL/L (ref 96–106)
CHOLEST SERPL-MCNC: 192 MG/DL (ref 100–199)
CO2 SERPL-SCNC: 23 MMOL/L (ref 20–29)
CREAT SERPL-MCNC: 0.76 MG/DL (ref 0.57–1)
CRP SERPL-MCNC: <1 MG/L (ref 0–10)
EBV NA IGG SER IA-ACNC: <18 U/ML (ref 0–17.9)
EBV VCA IGG SER IA-ACNC: <18 U/ML (ref 0–17.9)
EBV VCA IGM SER IA-ACNC: <36 U/ML (ref 0–35.9)
EGFRCR SERPLBLD CKD-EPI 2021: 114 ML/MIN/1.73
EOSINOPHIL # BLD AUTO: 0.1 X10E3/UL (ref 0–0.4)
EOSINOPHIL NFR BLD AUTO: 1 %
ERYTHROCYTE [DISTWIDTH] IN BLOOD BY AUTOMATED COUNT: 12.7 % (ref 11.7–15.4)
ERYTHROCYTE [SEDIMENTATION RATE] IN BLOOD BY WESTERGREN METHOD: 7 MM/HR (ref 0–32)
GLOBULIN SER CALC-MCNC: 2.7 G/DL (ref 1.5–4.5)
GLUCOSE SERPL-MCNC: 88 MG/DL (ref 70–99)
HCT VFR BLD AUTO: 42.7 % (ref 34–46.6)
HDLC SERPL-MCNC: 64 MG/DL
HGB BLD-MCNC: 14.7 G/DL (ref 11.1–15.9)
IMM GRANULOCYTES # BLD AUTO: 0 X10E3/UL (ref 0–0.1)
IMM GRANULOCYTES NFR BLD AUTO: 1 %
LDLC SERPL CALC-MCNC: 105 MG/DL (ref 0–99)
LYMPHOCYTES # BLD AUTO: 1.5 X10E3/UL (ref 0.7–3.1)
LYMPHOCYTES NFR BLD AUTO: 27 %
MCH RBC QN AUTO: 31.8 PG (ref 26.6–33)
MCHC RBC AUTO-ENTMCNC: 34.4 G/DL (ref 31.5–35.7)
MCV RBC AUTO: 92 FL (ref 79–97)
MONOCYTES # BLD AUTO: 0.4 X10E3/UL (ref 0.1–0.9)
MONOCYTES NFR BLD AUTO: 7 %
NEUTROPHILS # BLD AUTO: 3.6 X10E3/UL (ref 1.4–7)
NEUTROPHILS NFR BLD AUTO: 63 %
PLATELET # BLD AUTO: 286 X10E3/UL (ref 150–450)
POTASSIUM SERPL-SCNC: 4 MMOL/L (ref 3.5–5.2)
PROT SERPL-MCNC: 7.1 G/DL (ref 6–8.5)
RBC # BLD AUTO: 4.62 X10E6/UL (ref 3.77–5.28)
SERVICE CMNT-IMP: NORMAL
SODIUM SERPL-SCNC: 140 MMOL/L (ref 134–144)
T3FREE SERPL-MCNC: 3 PG/ML (ref 2–4.4)
T4 FREE SERPL-MCNC: 1.45 NG/DL (ref 0.82–1.77)
TRIGL SERPL-MCNC: 132 MG/DL (ref 0–149)
TSH SERPL DL<=0.005 MIU/L-ACNC: 0.59 UIU/ML (ref 0.45–4.5)
VLDLC SERPL CALC-MCNC: 23 MG/DL (ref 5–40)
WBC # BLD AUTO: 5.6 X10E3/UL (ref 3.4–10.8)

## 2023-06-11 LAB
GAMMA INTERFERON BACKGROUND BLD IA-ACNC: 0 IU/ML
LAB CORP QUANTIFERON INCUBATION: NORMAL
M TB IFN-G BLD-IMP: NEGATIVE
M TB IFN-G CD4+ T-CELLS BLD-ACNC: 0.04 IU/ML
M TBIFN-G CD4+ CD8+T-CELLS BLD-ACNC: 0.04 IU/ML
MITOGEN IGNF BLD-ACNC: >10 IU/ML
SERVICE CMNT-IMP: NORMAL

## 2023-06-21 DIAGNOSIS — R53.83 OTHER FATIGUE: Primary | ICD-10-CM

## 2023-06-21 NOTE — PROGRESS NOTES
Pt notified that lyme lab work was never drawn from earlier this month. Pt will return to lab at her convenience to have this drawn. Order placed

## 2023-06-26 ENCOUNTER — TELEPHONE (OUTPATIENT)
Dept: FAMILY MEDICINE | Facility: CLINIC | Age: 23
End: 2023-06-26
Payer: COMMERCIAL

## 2023-06-26 NOTE — TELEPHONE ENCOUNTER
Patient dropped off  Staff Health Assessment form to be completed. Last EPP 05/25/2023. Please call [phone] when completed. Copy scanned into chart

## 2023-06-30 ENCOUNTER — NURSE TRIAGE (OUTPATIENT)
Dept: FAMILY MEDICINE | Facility: CLINIC | Age: 23
End: 2023-06-30
Payer: COMMERCIAL

## 2023-06-30 RX ORDER — VALACYCLOVIR HYDROCHLORIDE 1 G/1
TABLET, FILM COATED ORAL
Qty: 4 TABLET | Refills: 0 | Status: SHIPPED | OUTPATIENT
Start: 2023-06-30 | End: 2024-02-08

## 2023-06-30 NOTE — TELEPHONE ENCOUNTER
Synopsis:    Pt with some viral symptoms and start of cold sore.  Sent valacyclovir, will come in for eval given recurrence and recent shingles    Disposition:  See PCP Within 2 Weeks  Care Advice:  Care Advice Given     Given By Given At Modified    Dianna Hager CRNP 6/30/2023  6:50 PM No    SEE PCP WITHIN 2 WEEKS: You need an evaluation for this ongoing problem within the next 2 weeks. Call your doctor during regular office hours and make an appointment.    Dianna Hager CRNP 6/30/2023  6:50 PM No    PRESCRIPTION MEDICATIONS FOR COLD SORES:    * Cold sores occur on one side of the outer lip. They are a recurrent problem in 20% of normal adults. A cold sore usually lasts about 7-10 days.   * There is no cure for cold sores.    * However, there are prescription anti-viral medications that can reduce the pain and decrease the duration by about 2 days.    Dianna Hager CRNP 6/30/2023  6:50 PM No    OTC DOCOSANOL 10% CREAM:    * Apply OTC docosanol cream (trade name Abreva) to the cold sore 5 times daily until healing occurs. Begin using this cream as soon as you first sense the beginning of an outbreak.   * Docosanol can reduce the pain and shorten the course by a day or two.   * Read the package instructions.    Dianna Hager CRNP 6/30/2023  6:50 PM No    PAIN MEDICINES:  * For pain relief, take acetaminophen, ibuprofen, or naproxen.  * Use the lowest amount that makes your pain feel better.  ACETAMINOPHEN (E.G., TYLENOL):   * Take 650 mg (two 325 mg pills) by mouth every 4-6 hours as needed. Each Regular Strength Tylenol pill has 325 mg of acetaminophen. The most you should take each day is 3,250 mg (10 Regular Strength pills a day).  * Another choice is to take 1,000 mg (two 500 mg pills) every 8 hours as needed. Each Extra Strength Tylenol pill has 500 mg of acetaminophen. The most you should take each day is 3,000 mg (6 Extra Strength pills a day).  IBUPROFEN (E.G., MOTRIN, ADVIL):  * Take 400 mg  (two 200 mg pills) by mouth every 6 hours as needed.  * Another choice is to take 600 mg (three 200 mg pills) by mouth every 8 hours as needed.  * The most you should take each day is 1,200 mg (six 200 mg pills a day), unless your doctor has told you to take more.  NAPROXEN (E.G., ALEVE):  * Take 220 mg (one 220 mg pill) by mouth every 8 hours as needed. You may take 440 mg (two 220 mg pills) for your first dose.  * The most you should take each day is 660 mg (three 220 mg pills a day), unless your doctor has told you to take more.  EXTRA NOTES:  * Acetaminophen is thought to be safer than ibuprofen or naproxen for people over 65 years old. Acetaminophen is in many OTC and prescription medicines. It might be in more than one medicine that you are taking. You need to be careful and not take an overdose. An acetaminophen overdose can hurt the liver.  * [employer] the company that makes Tylenol, has different dosage instructions for Tylenol in [place] and the [place]. In [place], the maximum recommended dose per day is 4,000 mg or twelve (12) Regular-Strength (325 mg) pills. In the [place], [employer] recommends a maximum dose of ten (10) Regular-Strength (325 mg) pills.  * Before taking any medicine, read all the instructions on the package.    Dianna Hager CRNP 6/30/2023  6:50 PM No    CAUTION - NSAIDS (E.G., IBUPROFEN, NAPROXEN):   * Do not take nonsteroidal anti-inflammatory drugs (NSAIDs) if you have stomach problems, kidney disease, heart failure, or other contraindications to using this type of medication.   * Do not take NSAID medications for over 7 days without consulting your PCP.   * Do not take NSAID medications if you are pregnant.   * You may take this medicine with or without food. Taking it with food or milk may lessen the chance the drug will upset your stomach.   * GASTROINTESTINAL RISK: There is an increased risk of stomach ulcers, GI bleeding, perforation.   * CARDIOVASCULAR RISK: There may  be an increased risk of heart attack and stroke.    Dianna Hager CRNP 6/30/2023  6:50 PM No    CONTAGIOUSNESS:   * SPREAD: Herpes from cold sores is contagious to other people. Discourage picking or rubbing the sore. Don't open the blisters. Wash your hands frequently. The cold sores are contagious until dry (5-7 days). Most cold sore sufferers note a tingling in the lip before the sore appears (prodromal phase). Patients are also contagious during this time period.  * EYES: Avoid spreading the virus to someone's eye by kissing or touching; an eye infection can be serious (herpes keratitis).  * MOUTH: Since the blisters and mouth secretions are contagious, avoid kissing other people during this time. Avoid sharing drinking glasses, eating utensils, or razors.  * SEX: Avoid oral sex during this time. Herpes from sores on your mouth can spread to your partner's genital area.  * CONTACT WITH IMMUNOSUPPRESSED PEOPLE: Avoid contact with anyone who has eczema or a weakened immune system.    Dianna Hager CRNP 6/30/2023  6:50 PM No    PREVENTION: Since cold sores are often triggered by exposure to intense sunlight, use a lip balm containing a sunscreen (SPF 30 or higher).    Dianna Hager CRNP 6/30/2023  6:50 PM No    EXPECTED COURSE: The pain typically subsides over 4-5 days and the sores typically heal over a 7-10 day period. On average, patients note recurrences 2-3 times per year.    Dianna Hager CRNP 6/30/2023  6:50 PM No    [place] N[place] HERPES HOTLINE:    * Phone number: [phone].   * Counselors provide information about transmission, treatment, prevention, and emotional issues.    Dianna Hager CRNP 6/30/2023  6:50 PM No    CALL BACK IF:    * Sores look infected    * Sores occur near or in the eye    * Sores last over 2 weeks.    Dianna Hager CRNP 6/30/2023  6:50 PM No    CARE ADVICE given per Fever Blisters of Lip (Cold Sores) (Adult) guideline.       Patient/Caregiver understands  "and will follow care advice?  Yes, plans to follow advice        Orders Placed This Encounter:  No orders of the defined types were placed in this encounter.        Reason for Disposition  • [1] Herpes sores are a recurrent problem AND [2] caller wants a prescription medicine to take the next time they occur    Answer Assessment - Initial Assessment Questions  1. APPEARANCE of BLISTERS: \"Describe the sores.\"      Swollen and tingling,  2. SIZE: \"How large an area is involved with the cold sores?\" (e.g., inches, cm or compare to coins)      Not fully out yet  3. LOCATION: \"Which part of the lip is involved?\"      Top right  4. ONSET: \"When did the fever blisters begin?\"      This morning  5. RECURRENT BLISTERS: \"Have you had fever blisters before?\" If so, ask: \"When was the last time?\" \"How many times a year?\"      Yes, 2-3 months ago  6. OTHER SYMPTOMS: \"Do you have any other symptoms?\" (e.g., fever, sores inside mouth)      Sore throat  7. PREGNANCY: \"Is there any chance you are pregnant?\" \"When was your last menstrual period?\"      LMP last week    Protocols used: COLD SORES (FEVER BLISTERS)-ADULT-AH    "

## 2023-07-14 ENCOUNTER — TELEPHONE (OUTPATIENT)
Dept: FAMILY MEDICINE | Facility: CLINIC | Age: 23
End: 2023-07-14
Payer: COMMERCIAL

## 2023-07-14 NOTE — TELEPHONE ENCOUNTER
Patient called for [facility]. Patient has a rash around mouth and sore throat. She was talking valacyclovir but finished that. She is not sure if it is shingles.      Patient can be reached at [phone].     TY

## 2023-07-14 NOTE — TELEPHONE ENCOUNTER
Return call to pt. EDUIN that first availability would be telemed on Monday with PCP. Did suggest being seen at  for eval and treatment

## 2023-07-17 ENCOUNTER — TELEPHONE (OUTPATIENT)
Dept: FAMILY MEDICINE | Facility: CLINIC | Age: 23
End: 2023-07-17

## 2023-07-17 ENCOUNTER — TELEMEDICINE (OUTPATIENT)
Dept: FAMILY MEDICINE | Facility: CLINIC | Age: 23
End: 2023-07-17
Payer: COMMERCIAL

## 2023-07-17 DIAGNOSIS — Z11.3 SCREENING EXAMINATION FOR STD (SEXUALLY TRANSMITTED DISEASE): ICD-10-CM

## 2023-07-17 DIAGNOSIS — L01.00 IMPETIGO: ICD-10-CM

## 2023-07-17 DIAGNOSIS — B08.4 HAND, FOOT AND MOUTH DISEASE: Primary | ICD-10-CM

## 2023-07-17 PROCEDURE — 99213 OFFICE O/P EST LOW 20 MIN: CPT | Mod: GT | Performed by: FAMILY MEDICINE

## 2023-07-17 RX ORDER — MUPIROCIN 20 MG/G
1 OINTMENT TOPICAL 3 TIMES DAILY
Qty: 22 G | Refills: 1 | Status: SHIPPED | OUTPATIENT
Start: 2023-07-17 | End: 2023-07-24

## 2023-07-17 ASSESSMENT — ENCOUNTER SYMPTOMS
DIARRHEA: 0
WOUND: 1
SORE THROAT: 1
SINUS PRESSURE: 0
CHILLS: 0
FEVER: 0
FACIAL SWELLING: 0
SINUS PAIN: 0
NAUSEA: 0
FATIGUE: 1
VOMITING: 0

## 2023-07-17 NOTE — PROGRESS NOTES
Verification of Patient Location:  The patient affirms they are currently located in the following state: [place]    Request for Consent:    Audio and Video Encounter   Linda, my name is Phu DO Silvia.  Before we proceed, can you please verify your identification by telling me your full name and date of birth?  Can you tell me who is in the room with you?    You and I are about to have a telemedicine check-in or visit because you have requested it.  This is a live video-conference.  I am a real person, speaking to you in real time.  There is no one else with me on the video-conference. I am not recording this conversation and I am asking you not to record it.  This telemedicine visit will be billed to your health insurance or you, if you are self-insured.  You understand you will be responsible for any copayments or coinsurances that apply to your telemedicine visit.  Communication platform used for this encounter:  [employer] Video Visit (Epic Video Client)       Before starting our telemedicine visit, I am required to get your consent for this virtual check-in or visit by telemedicine. Do you consent?      Patient Response to Request for Consent:  Yes      Visit Documentation:  Subjective     Patient ID: Kyaw Harrison is a 23 y.o. female.  2000      HPI    Pt is c/o of a sore throat and rash around her lips for one month. The sore throat is improving along with the rash around her mouth. She also noticed a few red bumps on her hand and feet which were itchy. In addition, she has been more tired lately. 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 She would also like to get tested for STDs    The following have been reviewed and updated as appropriate in this visit:   Allergies  Meds  Problems       Review of Systems   Constitutional: Positive for fatigue. Negative for chills and fever.   HENT: Positive for sore throat. Negative for congestion, facial swelling, hearing loss, mouth sores, nosebleeds, sinus pressure and sinus pain.     Cardiovascular: Negative for chest pain.   Gastrointestinal: Negative for diarrhea, nausea and vomiting.   Skin: Positive for rash and wound (edge of lip on the right side).         Assessment/Plan   Diagnoses and all orders for this visit:    Hand, foot and mouth disease (Primary)  Comments:  reassurance provided. sx onset was one month ago. sx improving. discussed supportive measures and good hygeine.     Impetigo  Comments:  start mupirocin BID for 7 days.  Orders:  -     mupirocin (BACTROBAN) 2 % ointment; Apply 1 Application topically 3 (three) times a day for 7 days.    Screening examination for STD (sexually transmitted disease)  Comments:  order has been placed  Orders:  -     Chlamydia/GC RNA:ThinPrep/Urine/Swab  -     HIV 1,2 AB P24 AG; Future  -     Hepatitis C antibody; Future  -     RPR; Future        Time Spent:  I spent 15 minutes on this date of service performing the following activities: obtaining history, entering orders, documenting, preparing for visit, obtaining / reviewing records and providing counseling and education.

## 2023-07-19 LAB
B BURGDOR IGG PATRN SER IB-IMP: NEGATIVE
B BURGDOR IGM PATRN SER IB-IMP: NEGATIVE
B BURGDOR18KD IGG SER QL IB: ABNORMAL
B BURGDOR23KD IGG SER QL IB: ABNORMAL
B BURGDOR23KD IGM SER QL IB: ABNORMAL
B BURGDOR28KD IGG SER QL IB: ABNORMAL
B BURGDOR30KD IGG SER QL IB: PRESENT
B BURGDOR39KD IGG SER QL IB: ABNORMAL
B BURGDOR39KD IGM SER QL IB: ABNORMAL
B BURGDOR41KD IGG SER QL IB: ABNORMAL
B BURGDOR41KD IGM SER QL IB: ABNORMAL
B BURGDOR45KD IGG SER QL IB: ABNORMAL
B BURGDOR58KD IGG SER QL IB: ABNORMAL
B BURGDOR66KD IGG SER QL IB: ABNORMAL
B BURGDOR93KD IGG SER QL IB: ABNORMAL
HCV IGG SERPL QL IA: NON REACTIVE
HIV 1+2 AB+HIV1 P24 AG SERPL QL IA: NON REACTIVE
RPR SER QL: NON REACTIVE

## 2023-07-19 NOTE — RESULT ENCOUNTER NOTE
Not sure why this took so long to result?  I saw her in June.  Lyme antibody test was negative for both past and current lyme disease.  How is she feeling?  TY!

## 2023-07-20 LAB
C TRACH RRNA SPEC QL NAA+PROBE: NEGATIVE
N GONORRHOEA RRNA SPEC QL NAA+PROBE: NEGATIVE

## 2023-08-15 ENCOUNTER — TELEPHONE (OUTPATIENT)
Dept: FAMILY MEDICINE | Facility: CLINIC | Age: 23
End: 2023-08-15
Payer: COMMERCIAL

## 2023-08-15 DIAGNOSIS — Z86.39 HISTORY OF HYPOTHYROIDISM: Primary | ICD-10-CM

## 2023-08-15 NOTE — TELEPHONE ENCOUNTER
Patients mother Naomi called requesting a script to see an Endocrinologist for hypothyroidism. Patient wants to go to [facility]. Please call naomi at [phone]

## 2023-08-17 ENCOUNTER — TELEPHONE (OUTPATIENT)
Dept: FAMILY MEDICINE | Facility: CLINIC | Age: 23
End: 2023-08-17
Payer: COMMERCIAL

## 2023-08-17 NOTE — TELEPHONE ENCOUNTER
Patient mother called the referral line requesting a referral to [facility] [address] Patient has [facility] which does not require insurance referrals but just a script. Please advise

## 2023-08-25 ENCOUNTER — TELEPHONE (OUTPATIENT)
Dept: FAMILY MEDICINE | Facility: CLINIC | Age: 23
End: 2023-08-25
Payer: COMMERCIAL

## 2023-08-25 RX ORDER — FLUCONAZOLE 150 MG/1
150 TABLET ORAL DAILY
Qty: 1 TABLET | Refills: 0 | Status: SHIPPED | OUTPATIENT
Start: 2023-08-25 | End: 2023-08-26

## 2023-08-25 NOTE — TELEPHONE ENCOUNTER
Patient's grandmother, Naomi called stating patient was recently in [facility]. Stated patient was diagnosed with staph infection and prescribed cephalexin. Stated pt now has a yeast infection caused by the medication and is asking if pt can get another medication for the yeast infeciton. Pls advise.

## 2023-10-05 ENCOUNTER — OFFICE VISIT (OUTPATIENT)
Dept: FAMILY MEDICINE | Facility: CLINIC | Age: 23
End: 2023-10-05
Payer: COMMERCIAL

## 2023-10-05 VITALS
BODY MASS INDEX: 25.95 KG/M2 | RESPIRATION RATE: 14 BRPM | TEMPERATURE: 97.9 F | HEART RATE: 102 BPM | DIASTOLIC BLOOD PRESSURE: 82 MMHG | SYSTOLIC BLOOD PRESSURE: 132 MMHG | WEIGHT: 152 LBS | HEIGHT: 64 IN | OXYGEN SATURATION: 98 %

## 2023-10-05 DIAGNOSIS — S06.0XAA CONCUSSION WITH UNKNOWN LOSS OF CONSCIOUSNESS STATUS, INITIAL ENCOUNTER: Primary | ICD-10-CM

## 2023-10-05 DIAGNOSIS — G90.A POTS (POSTURAL ORTHOSTATIC TACHYCARDIA SYNDROME): ICD-10-CM

## 2023-10-05 PROCEDURE — 99214 OFFICE O/P EST MOD 30 MIN: CPT | Performed by: NURSE PRACTITIONER

## 2023-10-05 PROCEDURE — 3008F BODY MASS INDEX DOCD: CPT | Performed by: NURSE PRACTITIONER

## 2023-10-05 RX ORDER — NORTRIPTYLINE HYDROCHLORIDE 10 MG/1
CAPSULE ORAL
COMMUNITY
End: 2023-10-05

## 2023-10-05 RX ORDER — MECLIZINE HYDROCHLORIDE 25 MG/1
TABLET ORAL
COMMUNITY
Start: 2023-03-07

## 2023-10-05 RX ORDER — NADOLOL 20 MG/1
10 TABLET ORAL 2 TIMES DAILY
COMMUNITY
End: 2024-03-01

## 2023-10-05 RX ORDER — ALBUTEROL SULFATE 90 UG/1
INHALANT RESPIRATORY (INHALATION)
COMMUNITY
Start: 2023-07-21 | End: 2024-03-01

## 2023-10-05 RX ORDER — METFORMIN HYDROCHLORIDE 500 MG/1
TABLET ORAL
COMMUNITY
Start: 2023-09-22

## 2023-10-05 ASSESSMENT — ENCOUNTER SYMPTOMS
HEADACHES: 1
NAUSEA: 0
DIFFICULTY URINATING: 0
PHOTOPHOBIA: 1
PALPITATIONS: 1
TROUBLE SWALLOWING: 0
TREMORS: 0
VOMITING: 0
DECREASED CONCENTRATION: 1
NUMBNESS: 0
DIZZINESS: 1
CONFUSION: 0
EYE PAIN: 0
FEVER: 0
DYSPHORIC MOOD: 0
NERVOUS/ANXIOUS: 1
CHILLS: 0
SLEEP DISTURBANCE: 0
SPEECH DIFFICULTY: 0
FATIGUE: 1
SHORTNESS OF BREATH: 1
WEAKNESS: 0

## 2023-10-05 NOTE — PATIENT INSTRUCTIONS
HOLD PARAMETERS FOR NADOLOL:     SBP < 110     And / Or     DBP < 70      NEUROLOGIST  Kadi Whitehead MD    [address] [place] [address]    [phone] (Work)

## 2023-10-05 NOTE — ASSESSMENT & PLAN NOTE
Advised moving up cardiology appt to discuss holter / loop recorder  Hold parameters given for BB  Push fluids and rest  Mentioned that [facility] has infusion center if able to get standing IVF order to avoid ED visit? Something to d/w specialist

## 2023-10-05 NOTE — ASSESSMENT & PLAN NOTE
Let me know how Neuro appt goes later today  Discuss concussion therapy with this provider  Rec'd FMLA - speak with HR rep about this  Brain rest reinforced, she's been spending a lot of time on her phone lately and watching netflix  Will defer to specialist on when they feel she is safe to drive again
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
## 2023-10-06 ENCOUNTER — TRANSCRIBE ORDERS (OUTPATIENT)
Dept: SCHEDULING | Age: 23
End: 2023-10-06

## 2023-10-06 DIAGNOSIS — F44.89 OTHER DISSOCIATIVE AND CONVERSION DISORDERS: Primary | ICD-10-CM

## 2023-10-07 ENCOUNTER — HOSPITAL ENCOUNTER (EMERGENCY)
Facility: HOSPITAL | Age: 23
Discharge: HOME | End: 2023-10-08
Attending: EMERGENCY MEDICINE
Payer: COMMERCIAL

## 2023-10-07 ENCOUNTER — APPOINTMENT (EMERGENCY)
Dept: RADIOLOGY | Facility: HOSPITAL | Age: 23
End: 2023-10-07
Payer: COMMERCIAL

## 2023-10-07 DIAGNOSIS — R40.4 UNRESPONSIVE EPISODE: Primary | ICD-10-CM

## 2023-10-07 LAB
ALBUMIN SERPL BCP-MCNC: 4.4 G/DL (ref 3.5–5)
ALP SERPL-CCNC: 52 U/L (ref 34–104)
ALT SERPL W P-5'-P-CCNC: 12 U/L (ref 7–52)
AMPHETAMINES SERPL QL SCN: NEGATIVE
ANION GAP SERPL CALCULATED.3IONS-SCNC: 9 MMOL/L
APTT PPP: 31 SECONDS (ref 23–37)
AST SERPL W P-5'-P-CCNC: 14 U/L (ref 13–39)
BARBITURATES UR QL: NEGATIVE
BASOPHILS # BLD AUTO: 0.03 THOUSANDS/ÂΜL (ref 0–0.1)
BASOPHILS NFR BLD AUTO: 0 % (ref 0–1)
BENZODIAZ UR QL: NEGATIVE
BILIRUB SERPL-MCNC: 0.46 MG/DL (ref 0.2–1)
BILIRUB UR QL STRIP: NEGATIVE
BUN SERPL-MCNC: 15 MG/DL (ref 5–25)
CALCIUM SERPL-MCNC: 9.3 MG/DL (ref 8.4–10.2)
CARDIAC TROPONIN I PNL SERPL HS: <2 NG/L
CHLORIDE SERPL-SCNC: 104 MMOL/L (ref 96–108)
CLARITY UR: CLEAR
CO2 SERPL-SCNC: 24 MMOL/L (ref 21–32)
COCAINE UR QL: NEGATIVE
COLOR UR: YELLOW
CREAT SERPL-MCNC: 0.66 MG/DL (ref 0.6–1.3)
D DIMER PPP FEU-MCNC: 0.37 UG/ML FEU
EOSINOPHIL # BLD AUTO: 0.05 THOUSAND/ÂΜL (ref 0–0.61)
EOSINOPHIL NFR BLD AUTO: 1 % (ref 0–6)
ERYTHROCYTE [DISTWIDTH] IN BLOOD BY AUTOMATED COUNT: 12.1 % (ref 11.6–15.1)
ETHANOL SERPL-MCNC: <10 MG/DL
EXT PREGNANCY TEST URINE: NEGATIVE
EXT. CONTROL: NORMAL
GFR SERPL CREATININE-BSD FRML MDRD: 126 ML/MIN/1.73SQ M
GLUCOSE SERPL-MCNC: 90 MG/DL (ref 65–140)
GLUCOSE SERPL-MCNC: 92 MG/DL (ref 65–140)
GLUCOSE UR STRIP-MCNC: NEGATIVE MG/DL
HCG SERPL QL: NEGATIVE
HCT VFR BLD AUTO: 43.5 % (ref 34.8–46.1)
HGB BLD-MCNC: 14.9 G/DL (ref 11.5–15.4)
HGB UR QL STRIP.AUTO: NEGATIVE
IMM GRANULOCYTES # BLD AUTO: 0.03 THOUSAND/UL (ref 0–0.2)
IMM GRANULOCYTES NFR BLD AUTO: 0 % (ref 0–2)
INR PPP: 0.89 (ref 0.84–1.19)
KETONES UR STRIP-MCNC: NEGATIVE MG/DL
LACTATE SERPL-SCNC: 1 MMOL/L (ref 0.5–2)
LEUKOCYTE ESTERASE UR QL STRIP: NEGATIVE
LYMPHOCYTES # BLD AUTO: 2.18 THOUSANDS/ÂΜL (ref 0.6–4.47)
LYMPHOCYTES NFR BLD AUTO: 30 % (ref 14–44)
MAGNESIUM SERPL-MCNC: 2 MG/DL (ref 1.9–2.7)
MCH RBC QN AUTO: 31.3 PG (ref 26.8–34.3)
MCHC RBC AUTO-ENTMCNC: 34.3 G/DL (ref 31.4–37.4)
MCV RBC AUTO: 91 FL (ref 82–98)
METHADONE UR QL: NEGATIVE
MONOCYTES # BLD AUTO: 0.48 THOUSAND/ÂΜL (ref 0.17–1.22)
MONOCYTES NFR BLD AUTO: 7 % (ref 4–12)
NEUTROPHILS # BLD AUTO: 4.57 THOUSANDS/ÂΜL (ref 1.85–7.62)
NEUTS SEG NFR BLD AUTO: 62 % (ref 43–75)
NITRITE UR QL STRIP: NEGATIVE
NRBC BLD AUTO-RTO: 0 /100 WBCS
OPIATES UR QL SCN: NEGATIVE
OXYCODONE+OXYMORPHONE UR QL SCN: NEGATIVE
PCP UR QL: NEGATIVE
PH UR STRIP.AUTO: 6.5 [PH]
PLATELET # BLD AUTO: 333 THOUSANDS/UL (ref 149–390)
PMV BLD AUTO: 9.5 FL (ref 8.9–12.7)
POTASSIUM SERPL-SCNC: 3.6 MMOL/L (ref 3.5–5.3)
PROT SERPL-MCNC: 7.6 G/DL (ref 6.4–8.4)
PROT UR STRIP-MCNC: NEGATIVE MG/DL
PROTHROMBIN TIME: 12.4 SECONDS (ref 11.6–14.5)
RBC # BLD AUTO: 4.76 MILLION/UL (ref 3.81–5.12)
SODIUM SERPL-SCNC: 137 MMOL/L (ref 135–147)
SP GR UR STRIP.AUTO: 1.01 (ref 1–1.03)
THC UR QL: POSITIVE
TSH SERPL DL<=0.05 MIU/L-ACNC: 0.66 UIU/ML (ref 0.45–4.5)
UROBILINOGEN UR QL STRIP.AUTO: 0.2 E.U./DL
WBC # BLD AUTO: 7.34 THOUSAND/UL (ref 4.31–10.16)

## 2023-10-07 PROCEDURE — 85610 PROTHROMBIN TIME: CPT | Performed by: EMERGENCY MEDICINE

## 2023-10-07 PROCEDURE — 83735 ASSAY OF MAGNESIUM: CPT | Performed by: EMERGENCY MEDICINE

## 2023-10-07 PROCEDURE — 83605 ASSAY OF LACTIC ACID: CPT | Performed by: EMERGENCY MEDICINE

## 2023-10-07 PROCEDURE — 81003 URINALYSIS AUTO W/O SCOPE: CPT | Performed by: EMERGENCY MEDICINE

## 2023-10-07 PROCEDURE — 99284 EMERGENCY DEPT VISIT MOD MDM: CPT | Performed by: EMERGENCY MEDICINE

## 2023-10-07 PROCEDURE — 96375 TX/PRO/DX INJ NEW DRUG ADDON: CPT

## 2023-10-07 PROCEDURE — 80307 DRUG TEST PRSMV CHEM ANLYZR: CPT | Performed by: EMERGENCY MEDICINE

## 2023-10-07 PROCEDURE — 82948 REAGENT STRIP/BLOOD GLUCOSE: CPT

## 2023-10-07 PROCEDURE — 85379 FIBRIN DEGRADATION QUANT: CPT | Performed by: EMERGENCY MEDICINE

## 2023-10-07 PROCEDURE — 80053 COMPREHEN METABOLIC PANEL: CPT | Performed by: EMERGENCY MEDICINE

## 2023-10-07 PROCEDURE — 84703 CHORIONIC GONADOTROPIN ASSAY: CPT | Performed by: EMERGENCY MEDICINE

## 2023-10-07 PROCEDURE — 84484 ASSAY OF TROPONIN QUANT: CPT | Performed by: EMERGENCY MEDICINE

## 2023-10-07 PROCEDURE — 96374 THER/PROPH/DIAG INJ IV PUSH: CPT

## 2023-10-07 PROCEDURE — 36415 COLL VENOUS BLD VENIPUNCTURE: CPT | Performed by: EMERGENCY MEDICINE

## 2023-10-07 PROCEDURE — 85730 THROMBOPLASTIN TIME PARTIAL: CPT | Performed by: EMERGENCY MEDICINE

## 2023-10-07 PROCEDURE — 71045 X-RAY EXAM CHEST 1 VIEW: CPT

## 2023-10-07 PROCEDURE — 93005 ELECTROCARDIOGRAM TRACING: CPT

## 2023-10-07 PROCEDURE — 99285 EMERGENCY DEPT VISIT HI MDM: CPT

## 2023-10-07 PROCEDURE — 81025 URINE PREGNANCY TEST: CPT | Performed by: EMERGENCY MEDICINE

## 2023-10-07 PROCEDURE — 84443 ASSAY THYROID STIM HORMONE: CPT | Performed by: EMERGENCY MEDICINE

## 2023-10-07 PROCEDURE — 96361 HYDRATE IV INFUSION ADD-ON: CPT

## 2023-10-07 PROCEDURE — 85025 COMPLETE CBC W/AUTO DIFF WBC: CPT | Performed by: EMERGENCY MEDICINE

## 2023-10-07 PROCEDURE — 82077 ASSAY SPEC XCP UR&BREATH IA: CPT | Performed by: EMERGENCY MEDICINE

## 2023-10-07 RX ORDER — KETOROLAC TROMETHAMINE 30 MG/ML
15 INJECTION, SOLUTION INTRAMUSCULAR; INTRAVENOUS ONCE
Status: COMPLETED | OUTPATIENT
Start: 2023-10-07 | End: 2023-10-07

## 2023-10-07 RX ORDER — ONDANSETRON 2 MG/ML
4 INJECTION INTRAMUSCULAR; INTRAVENOUS ONCE
Status: COMPLETED | OUTPATIENT
Start: 2023-10-07 | End: 2023-10-07

## 2023-10-07 RX ADMIN — KETOROLAC TROMETHAMINE 15 MG: 30 INJECTION, SOLUTION INTRAMUSCULAR; INTRAVENOUS at 23:22

## 2023-10-07 RX ADMIN — ONDANSETRON 4 MG: 2 INJECTION INTRAMUSCULAR; INTRAVENOUS at 23:22

## 2023-10-07 RX ADMIN — SODIUM CHLORIDE 500 ML: 0.9 INJECTION, SOLUTION INTRAVENOUS at 22:27

## 2023-10-07 NOTE — Clinical Note
Aziza Thomas was seen and treated in our emergency department on 10/7/2023. Diagnosis:     [facility]  may return to work on return date. She may return on this date: 10/09/2023         If you have any questions or concerns, please don't hesitate to call.       Joycelyn Ma, DO    ______________________________           _______________          _______________  Hospital Representative                              Date                                Time

## 2023-10-08 VITALS
RESPIRATION RATE: 18 BRPM | HEART RATE: 87 BPM | WEIGHT: 135 LBS | OXYGEN SATURATION: 100 % | SYSTOLIC BLOOD PRESSURE: 127 MMHG | DIASTOLIC BLOOD PRESSURE: 70 MMHG | BODY MASS INDEX: 23.17 KG/M2

## 2023-10-08 NOTE — ED PROVIDER NOTES
History  Chief Complaint   Patient presents with   • Loss of Consciousness     26-year-old female brought to emergency department by friends for unresponsive episode, notes she has POTS. No injury reported. Records reviewed      History provided by:  Patient and friend  History limited by:  Acuity of condition  Altered Mental Status  Presenting symptoms: unresponsiveness    Chronicity:  Recurrent  Associated symptoms: no fever        Prior to Admission Medications   Prescriptions Last Dose Informant Patient Reported? Taking?   naproxen (NAPROSYN) 250 mg tablet   No No   Sig: Take 1 tablet (250 mg total) by mouth 2 (two) times a day with meals      Facility-Administered Medications: None       Past Medical History:   Diagnosis Date   • Hypoglycemia    • POTS (postural orthostatic tachycardia syndrome)        Past Surgical History:   Procedure Laterality Date   • IR LUMBAR PUNCTURE  11/29/2016       History reviewed. No pertinent family history. I have reviewed and agree with the history as documented. E-Cigarette/Vaping   • E-Cigarette Use Never User      E-Cigarette/Vaping Substances   • Nicotine No    • THC No    • CBD No    • Flavoring No    • Other No    • Unknown No      Social History     Tobacco Use   • Smoking status: Never   • Smokeless tobacco: Never   Vaping Use   • Vaping Use: Never used   Substance Use Topics   • Alcohol use: Yes   • Drug use: Not Currently       Review of Systems   Constitutional: Negative for fever. All other systems reviewed and are negative. Physical Exam  Physical Exam  Vitals and nursing note reviewed. Constitutional:       General: She is not in acute distress.      Comments: Slumped over, appears to be breathing normally, good color, responds to name and opens eyes, converses and articulate and then slumps over again, unresponsive, does not respond to name but responds to saline syringe at eyelids appropriately with squinting and avoiding   HENT:      Head: Normocephalic and atraumatic. Mouth/Throat:      Mouth: Mucous membranes are moist.      Pharynx: Oropharynx is clear. Eyes:      Conjunctiva/sclera: Conjunctivae normal.      Pupils: Pupils are equal, round, and reactive to light. Cardiovascular:      Rate and Rhythm: Normal rate and regular rhythm. Heart sounds: Normal heart sounds. Pulmonary:      Effort: Pulmonary effort is normal.      Breath sounds: Normal breath sounds. Abdominal:      General: Bowel sounds are normal. There is no distension. Palpations: Abdomen is soft. Tenderness: There is no abdominal tenderness. Musculoskeletal:         General: No deformity. Cervical back: Neck supple. Right lower leg: No edema. Left lower leg: No edema. Skin:     General: Skin is warm and dry. Neurological:      Mental Status: She is alert. Comments: Withdraws to noxious stimulation at extremities / sternum   Psychiatric:         Behavior: Behavior normal.         Thought Content:  Thought content normal.         Judgment: Judgment normal.         Vital Signs  ED Triage Vitals [10/07/23 2221]   Temp Pulse Respirations Blood Pressure SpO2   -- 97 16 155/83 99 %      Temp src Heart Rate Source Patient Position - Orthostatic VS BP Location FiO2 (%)   -- Monitor Lying Right arm --      Pain Score       --           Vitals:    10/07/23 2245 10/07/23 2300 10/07/23 2330 10/07/23 2345   BP: 127/82 111/63 125/71 127/70   Pulse: 97 86 91 87   Patient Position - Orthostatic VS:             Visual Acuity      ED Medications  Medications   sodium chloride 0.9 % bolus 500 mL (0 mL Intravenous Stopped 10/7/23 2342)   ondansetron (ZOFRAN) injection 4 mg (4 mg Intravenous Given 10/7/23 2322)   ketorolac (TORADOL) injection 15 mg (15 mg Intravenous Given 10/7/23 2322)       Diagnostic Studies  Results Reviewed     Procedure Component Value Units Date/Time    TSH [[id number]]  (Normal) Collected: 10/07/23 2225    Lab Status: Final result Specimen: Blood from Arm, Left Updated: 10/07/23 2340     TSH 3RD GENERATON 0.661 uIU/mL     Rapid drug screen, urine [[id number]]  (Abnormal) Collected: 10/07/23 2258    Lab Status: Final result Specimen: Urine, Clean Catch Updated: 10/07/23 2329     Amph/Meth UR Negative     Barbiturate Ur Negative     Benzodiazepine Urine Negative     Cocaine Urine Negative     Methadone Urine Negative     Opiate Urine Negative     PCP Ur Negative     THC Urine Positive     Oxycodone Urine Negative    Narrative:      Presumptive report. If requested, specimen will be sent to reference lab for confirmation. FOR MEDICAL PURPOSES ONLY. IF CONFIRMATION NEEDED PLEASE CONTACT THE LAB WITHIN 5 DAYS.     Drug Screen Cutoff Levels:  AMPHETAMINE/METHAMPHETAMINES  1000 ng/mL  BARBITURATES     200 ng/mL  BENZODIAZEPINES     200 ng/mL  COCAINE      300 ng/mL  METHADONE      300 ng/mL  OPIATES      300 ng/mL  PHENCYCLIDINE     25 ng/mL  THC       50 ng/mL  OXYCODONE      100 ng/mL    UA w Reflex to Microscopic w Reflex to Culture [[id number]] Collected: 10/07/23 2259    Lab Status: Final result Specimen: Urine, Clean Catch Updated: 10/07/23 2317     Color, UA Yellow     Clarity, UA Clear     Specific Gravity, UA 1.010     pH, UA 6.5     Leukocytes, UA Negative     Nitrite, UA Negative     Protein, UA Negative mg/dl      Glucose, UA Negative mg/dl      Ketones, UA Negative mg/dl      Urobilinogen, UA 0.2 E.U./dl      Bilirubin, UA Negative     Occult Blood, UA Negative    Ethanol [[id number]]  (Normal) Collected: 10/07/23 2225    Lab Status: Final result Specimen: Blood from Arm, Left Updated: 10/07/23 2312     Ethanol Lvl <10 mg/dL     hCG, qualitative pregnancy [[id number]]  (Normal) Collected: 10/07/23 2225    Lab Status: Final result Specimen: Blood from Arm, Left Updated: 10/07/23 2302     Preg, Serum Negative    POCT pregnancy, urine [[id number]]  (Normal) Resulted: 10/07/23 2302    Lab Status: Final result Updated: 10/07/23 2302     EXT Preg Test, Ur Negative     Control Valid    HS Troponin 0hr (reflex protocol) [[id number]]  (Normal) Collected: 10/07/23 2225    Lab Status: Final result Specimen: Blood from Arm, Left Updated: 10/07/23 2301     hs TnI 0hr <2 ng/L     Comprehensive metabolic panel [[id number]] Collected: 10/07/23 2225    Lab Status: Final result Specimen: Blood from Arm, Left Updated: 10/07/23 2259     Sodium 137 mmol/L      Potassium 3.6 mmol/L      Chloride 104 mmol/L      CO2 24 mmol/L      ANION GAP 9 mmol/L      BUN 15 mg/dL      Creatinine 0.66 mg/dL      Glucose 90 mg/dL      Calcium 9.3 mg/dL      AST 14 U/L      ALT 12 U/L      Alkaline Phosphatase 52 U/L      Total Protein 7.6 g/dL      Albumin 4.4 g/dL      Total Bilirubin 0.46 mg/dL      eGFR 126 ml/min/1.73sq m     Narrative:      [facility] guidelines for Chronic Kidney Disease (CKD):   •  Stage 1 with normal or high GFR (GFR > 90 mL/min/1.73 square meters)  •  Stage 2 Mild CKD (GFR = 60-89 mL/min/1.73 square meters)  •  Stage 3A Moderate CKD (GFR = 45-59 mL/min/1.73 square meters)  •  Stage 3B Moderate CKD (GFR = 30-44 mL/min/1.73 square meters)  •  Stage 4 Severe CKD (GFR = 15-29 mL/min/1.73 square meters)  •  Stage 5 End Stage CKD (GFR <15 mL/min/1.73 square meters)  Note: GFR calculation is accurate only with a steady state creatinine    Magnesium [[id number]]  (Normal) Collected: 10/07/23 2225    Lab Status: Final result Specimen: Blood from Arm, Left Updated: 10/07/23 2259     Magnesium 2.0 mg/dL     Lactic acid, plasma (w/reflex if result > 2.0) [[id number]]  (Normal) Collected: 10/07/23 2225    Lab Status: Final result Specimen: Blood from Arm, Left Updated: 10/07/23 2258     LACTIC ACID 1.0 mmol/L     Narrative:      Result may be elevated if tourniquet was used during collection.     D-Dimer [[id number]]  (Normal) Collected: 10/07/23 2225    Lab Status: Final result Specimen: Blood from Arm, Left Updated: 10/07/23 2252     DNj Modi 0.37 ug/ml FEU     Protime-INR [[id number]]  (Normal) Collected: 10/07/23 2225    Lab Status: Final result Specimen: Blood from Arm, Left Updated: 10/07/23 2250     Protime 12.4 seconds      INR 0.89    APTT [[id number]]  (Normal) Collected: 10/07/23 2225    Lab Status: Final result Specimen: Blood from Arm, Left Updated: 10/07/23 2250     PTT 31 seconds     CBC and differential [[id number]] Collected: 10/07/23 2225    Lab Status: Final result Specimen: Blood from Arm, Left Updated: 10/07/23 2232     WBC 7.34 Thousand/uL      RBC 4.76 Million/uL      Hemoglobin 14.9 g/dL      Hematocrit 43.5 %      MCV 91 fL      MCH 31.3 pg      MCHC 34.3 g/dL      RDW 12.1 %      MPV 9.5 fL      Platelets 228 Thousands/uL      nRBC 0 /100 WBCs      Neutrophils Relative 62 %      Immat GRANS % 0 %      Lymphocytes Relative 30 %      Monocytes Relative 7 %      Eosinophils Relative 1 %      Basophils Relative 0 %      Neutrophils Absolute 4.57 Thousands/µL      Immature Grans Absolute 0.03 Thousand/uL      Lymphocytes Absolute 2.18 Thousands/µL      Monocytes Absolute 0.48 Thousand/µL      Eosinophils Absolute 0.05 Thousand/µL      Basophils Absolute 0.03 Thousands/µL     Fingerstick Glucose (POCT) [[id number]]  (Normal) Collected: 10/07/23 2222    Lab Status: Final result Updated: 10/07/23 2223     POC Glucose 92 mg/dl                  XR chest portable   ED Interpretation by Marilyn Miguel DO (10/07 2315)   No acute cardiopulmonary changes      Final Result by Blessing Friedman MD (10/08 1733)      No acute cardiopulmonary disease.                Workstation performed: [id number]                    Procedures  Procedures         ED Course  ED Course as of 10/08/23 2250   Sat Oct 07, 2023   2228 EKG 2224 normal sinus rhythm rate 86 normal axis normal intervals T wave inversion V1 V2 aVL no ST elevation or depression interpreted by me   2252 D-Dimer, Quant: 0.37   2254 Alert awake, lucid and appropriate, notes concussion few weeks ago with stuttering speech, following with neurologist, history of migraines and sleep loss, no ingestion or SI   2258 Nursing notes still having eye fluttering episodes with head elevated, able to elevate self for bedpan   2300 eGFR: 126   2301 LACTIC ACID: 1.0   2322 Eating and drinking, remained stable for close outpatient follow-up, heart rate 80s-90s sitting up, normotensive, mother in route to hospital   2334 PREGNANCY TEST URINE: Negative  Urge for stooling, ambulates to bathroom without difficulty, well-balanced, feeling better                                             Medical Decision Making  Unresponsive episode: acute illness or injury  Amount and/or Complexity of Data Reviewed  Labs: ordered. Decision-making details documented in ED Course. Radiology: ordered and independent interpretation performed. Decision-making details documented in ED Course. ECG/medicine tests: ordered and independent interpretation performed. Decision-making details documented in ED Course. Risk  Prescription drug management. Disposition  Final diagnoses:   Unresponsive episode     Time reflects when diagnosis was documented in both MDM as applicable and the Disposition within this note     Time User Action Codes Description Comment    10/7/2023 11:24 PM Vega Roque Add [R40.4] Unresponsive episode       ED Disposition     ED Disposition   Discharge    Condition   Stable    Date/Time   Sat Oct 7, 2023 11:23 PM    Comment   [facility] discharge to home/self care. Follow-up Information    None         Discharge Medication List as of 10/7/2023 11:26 PM      CONTINUE these medications which have NOT CHANGED    Details   naproxen (NAPROSYN) 250 mg tablet Take 1 tablet (250 mg total) by mouth 2 (two) times a day with meals, Starting u 2/2/2023, Print             No discharge procedures on file.     PDMP Review     None          ED Provider  Electronically Signed by           Vega Roque, DO  10/08/23 0016

## 2023-10-08 NOTE — DISCHARGE INSTRUCTIONS
Unresponsive episode similar to prior  Please inform your physician of visit, arrange follow up in 3-5 days  Maintain good fluid intake and sleep habits  Continue medicines as currently

## 2023-10-08 NOTE — ED NOTES
PT is awake and talking with friend. Helen Mancia, [address] [place] [address]  10/07/23 1162

## 2023-10-08 NOTE — ED NOTES
Pt lost consciousness for 30 seconds, eyes rolling, vital signs stable. Pt then answered to her name. Helen Alfonso [place]  10/07/23 4135

## 2023-10-09 LAB
ATRIAL RATE: 86 BPM
P AXIS: 79 DEGREES
PR INTERVAL: 140 MS
QRS AXIS: 90 DEGREES
QRSD INTERVAL: 78 MS
QT INTERVAL: 348 MS
QTC INTERVAL: 416 MS
T WAVE AXIS: 77 DEGREES
VENTRICULAR RATE: 86 BPM

## 2023-10-11 ENCOUNTER — TELEPHONE (OUTPATIENT)
Dept: FAMILY MEDICINE | Facility: CLINIC | Age: 23
End: 2023-10-11

## 2023-10-11 ENCOUNTER — HOSPITAL ENCOUNTER (OUTPATIENT)
Dept: CARDIOLOGY | Facility: HOSPITAL | Age: 23
Discharge: HOME | End: 2023-10-11
Attending: PSYCHIATRY & NEUROLOGY
Payer: COMMERCIAL

## 2023-10-11 PROCEDURE — 95816 EEG AWAKE AND DROWSY: CPT

## 2023-10-11 NOTE — TELEPHONE ENCOUNTER
Good morning,  Patient's mother called in to provide referral  info for EEG.     Diagnosis code: F44.89  Test: EEG  Location: [facility] [address]  NPI: [id number]  Ordering Physician: Dr. Irma Cook  Practice info: [facility]   [address] [place] [id number]  Phone:[phone]  Fax: [phone]    Please give pt's mother a call back with any questions or concerns.

## 2023-11-22 ENCOUNTER — TELEPHONE (OUTPATIENT)
Dept: FAMILY MEDICINE | Facility: CLINIC | Age: 23
End: 2023-11-22
Payer: COMMERCIAL

## 2023-11-22 NOTE — TELEPHONE ENCOUNTER
[facility] Appointment Request   Provider: Silvia  Appointment Type: Same Day  Reason for Visit: Swollen lymnoids under the neck and under arms, sore throat for about a week. Since Sunday sore throat has been worsening  Available Day and Time:Today  Best Contact Number: [phone]    The practice will reach out to schedule your appointment within the next 2 business days.

## 2023-12-04 ENCOUNTER — TELEPHONE (OUTPATIENT)
Dept: FAMILY MEDICINE | Facility: CLINIC | Age: 23
End: 2023-12-04
Payer: COMMERCIAL

## 2023-12-04 NOTE — TELEPHONE ENCOUNTER
Patient called stating that she is currently out of town and experiencing some chest congestion. She has been very tired and sleeping more. She states that yesterday she experienced cough, chest pain, and trouble breathing. She states this morning she has a rash going down her left arm. She states that this happened before and it was staph. Please advise.     No available nurse for triage.    Patient can be reached at [phone].    Thank you!  Krys

## 2023-12-04 NOTE — TELEPHONE ENCOUNTER
Spoke with pt. States she has been feeling run down for approx 1 week. On Saturday she developed chills, sweats. Does not have thermometer to check temperature. Since yesterday she has been having cough and trouble catching her breath with activity. She also notes a itchy rash on her left arm which looks like 3 boils. Does have hx of staph infection and notes rash looks similar. Pt currently out of town until Thursday. Suggested UC eval but pt prefers to be seen in office upon her return. appt scheduled. Pt will seek care at UC should symptoms worsen prior to appt

## 2023-12-04 NOTE — TELEPHONE ENCOUNTER
Patient called with complaints of Staph infection, trouble breathing, chesty symptoms, rash. Call transferred to [place] for triage.

## 2023-12-07 ENCOUNTER — OFFICE VISIT (OUTPATIENT)
Dept: FAMILY MEDICINE | Facility: CLINIC | Age: 23
End: 2023-12-07
Payer: COMMERCIAL

## 2023-12-07 VITALS
HEIGHT: 64 IN | HEART RATE: 102 BPM | RESPIRATION RATE: 18 BRPM | TEMPERATURE: 98.2 F | SYSTOLIC BLOOD PRESSURE: 124 MMHG | DIASTOLIC BLOOD PRESSURE: 76 MMHG | OXYGEN SATURATION: 98 % | BODY MASS INDEX: 25.27 KG/M2 | WEIGHT: 148 LBS

## 2023-12-07 DIAGNOSIS — J06.9 UPPER RESPIRATORY TRACT INFECTION, UNSPECIFIED TYPE: ICD-10-CM

## 2023-12-07 DIAGNOSIS — R21 RASH: ICD-10-CM

## 2023-12-07 DIAGNOSIS — N92.6 LATE MENSTRUATION: Primary | ICD-10-CM

## 2023-12-07 LAB
EXPIRATION DATE: NORMAL
Lab: NORMAL
POCT MANUFACTURER: NORMAL
PREGNANCY TEST URINE, POC: NEGATIVE

## 2023-12-07 PROCEDURE — 3008F BODY MASS INDEX DOCD: CPT | Performed by: NURSE PRACTITIONER

## 2023-12-07 PROCEDURE — 99214 OFFICE O/P EST MOD 30 MIN: CPT | Performed by: NURSE PRACTITIONER

## 2023-12-07 PROCEDURE — 81025 URINE PREGNANCY TEST: CPT | Performed by: NURSE PRACTITIONER

## 2023-12-07 RX ORDER — PROPRANOLOL HYDROCHLORIDE 60 MG/1
10 CAPSULE, EXTENDED RELEASE ORAL DAILY
COMMUNITY
Start: 2023-11-29 | End: 2024-02-06 | Stop reason: HOSPADM

## 2023-12-07 RX ORDER — DOXYCYCLINE HYCLATE 100 MG
100 TABLET ORAL 2 TIMES DAILY
Qty: 14 TABLET | Refills: 0 | Status: SHIPPED | OUTPATIENT
Start: 2023-12-07 | End: 2023-12-14

## 2023-12-07 ASSESSMENT — ENCOUNTER SYMPTOMS
CHEST TIGHTNESS: 0
SHORTNESS OF BREATH: 0
DIARRHEA: 0
NAUSEA: 0
SORE THROAT: 1
VOMITING: 0
FATIGUE: 1
PALPITATIONS: 0
FEVER: 0
COUGH: 1
WHEEZING: 0
CHILLS: 0
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
## 2023-12-07 NOTE — ASSESSMENT & PLAN NOTE
Symptoms lingering quite some time but exam fairly benign  On doxy for rash anyway- this should cover if bacterial source  Rest, fluids, mucinex, tylenol/motrin, robitussin  F/u if no better

## 2023-12-07 NOTE — ASSESSMENT & PLAN NOTE
Progress Note  [facility]        PATIENT DETAILS  Date: 10/27/2021   Syeda Justice  89 year old female  [id number]  3110/A  PCP: Medardo Mijares MD   LOS: 1 day     CC   Chief Complaint   Patient presents with   • Fall   • Leg Pain        Subjective   Subjective  No acute events overnight. Complains of pain in her back and buttock from when she fell but slowly improving. She also notes constipation. Denies lightheadedness, dizziness, chest pain, chest pressure, sob, cough, wheezing, abd pain, nausea, vomiting, diarrhea, dysuria, numbness, weakness, or tingling.     ROS  See above    Objective   Meds  See Epic MAR     IVF      VITALS  Vital Last Value 24 Hour Range   Temperature 98.2 °F (36.8 °C) Temp  Min: 98.1 °F (36.7 °C)  Max: 98.5 °F (36.9 °C)   Pulse 64 Pulse  Min: 64  Max: 99   Respiratory 16 Resp  Min: 16  Max: 18   Blood Pressure 123/59 BP  Min: 123/59  Max: 135/64   Pulse Oximetry 94 % SpO2  Min: 90 %  Max: 96 %   CVP   No data recorded     Vital Today Admit   Weight 62 kg (136 lb 11 oz) Weight: 54.3 kg (119 lb 11.4 oz)   Height N/A Height: 4' 11\" (149.9 cm)   BMI N/A BMI (Calculated): 24.18     BMI  Body mass index is 27.61 kg/m².     PHYSICAL EXAM  General - Patient is in no acute distress.  Patient is conversing freely in full sentences.   Head - Atraumatic, normocephalic  Eyes - Extra ocular muscles intact. Pupils, equal, round, and reactive to light. Sclerae are anicteric  ENT - No drainage from nasal mucosa or nares. Oropharyngeal mucous membranes are moist. Neck is soft and supple  LYMPHNODES - No cervical or supraclavicular lymphadenopathy   CV - Regular rate and rhythm without additional heartsounds. Peripheral pulses are 2+ and symmetric. Cap refill <2 seconds.  PULM - CTAB w/o wheezing, rhonchi or rales   ABDOMEN - Soft, non-tender and non-distended. Bowel sounds are normoactive. No guarding or rebound tenderness. No hepatosplenomegaly.  UPT negative  If no menses in the next couple days would repeat home test  If still negative and no menses would see GYN      MUSCULOSKELETAL - Warm and well perfused. No cyanosis, clubbing, or edema. Unable to illicit tenderness over back or buttock.   SKIN - Warm, no rashes or lesions.  NEURO - CNs II-XII are grossly intact.  Strength, sensation, and coordination of RLE/RUE/LLE/LUEs are grossly intact.  PSYCH - Alert and oriented to person, place and time. Recent and remote memory somewhat impaired.       LABS  I personally reviewed all recent labs    Recent Labs   Lab 10/27/21  0408 10/25/21  1756   SODIUM 141 140   POTASSIUM 4.1 3.7   CHLORIDE 104 103   CO2 25 23   GLUCOSE 80 102*   BUN 16 18   CREATININE 0.83 0.90   CALCIUM 9.0 9.6   ALBUMIN  --  3.9   MG 1.6* 1.7   BILIRUBIN  --  0.4   ALKPT  --  102   AST  --  36   GPT  --  25   TOTPROTEIN  --  7.9   LIPA  --  75   TSH  --  3.188     No results found  Recent Labs   Lab 10/25/21  1756   WBC 10.2   RBC 4.11   HGB 12.2   HCT 36.7      MCV 89.3     Recent Labs   Lab 10/25/21  1756   TSH 3.188     Urinalysis  Recent Labs   Lab 10/25/21  1935   USPG 1.010   UPH 5.0   UKET 20 *   UPROT Negative   UGLU Negative   UBILI Negative   UROB 0.2   UWBC Negative   UNITR Negative   URBC Small*         Microbiology   Specimen Description (no units)   Date Value   07/31/2017 URINE, CLEAN CATCH/MIDSTREAM   03/04/2013 URINE, CLEAN CATCH/MIDSTREAM      CULTURE (no units)   Date Value   07/31/2017 >100,000 CFU/mL ESCHERICHIA COLI (P)   03/04/2013     <10,000 CFU/mL MIXED BACTERIAL TOMASA WITH NO PREDOMINATING TYPE        IMAGING  XR Lumbar Spine 2 or 3 Views, XR Pelvis 1 View    Result Date: 10/21/2021  Narrative: XR LUMBAR SPINE 2 OR 3 VIEWS, XR PELVIS 1 OR 2 VW CLINICAL INDICATION:  89 years-old Female with presenting history of back pain. COMPARISON:  Lumbar spine radiograph 9/12/2014 and hip radiograph 4/9/2013. TECHNIQUE:  3 views of the lumbar spine and one AP view of the pelvis. FINDINGS: Lumbar spine: 5 lumbar type vertebral bodies. Status post instrumented L3-L5 posterior lumbar fusion  with bilateral transpedicular screws and interbody spacers at L3-4 and L4-5. No evidence for hardware complication. Redemonstrated anterolisthesis of L5 on S1. Severe intervertebral disc height loss at L5-S1. Mild degenerative changes of the upper lumbar spine. Extensive abdominal aorta is calcified atherosclerosis. Redemonstrated prominent sclerosis of the L5 vertebral body. No acute lumbar osseous abnormalities are identified. Pelvis: Status post right hip arthroplasty. Extensive bony callus formation of the right femoral neck, greater trochanteric, and lesser trochanter and most recent imaging assessment for an acute fracture. Moderate degenerative changes of the left hip. No acute osseous abnormalities of the pelvic girdle.     Impression: IMPRESSION: Lumbar spine: No acute osseous abnormality. Redemonstrated postsurgical changes without evidence for complication. Pelvis: No acute osseous abnormalities of the pelvic girdle or left femur. Extensive bony callus formation around the right hip arthroplasty limits assessment for acute fracture. I, Attending Radiologist Everton Anna DO, have reviewed the images and report and concur with these findings interpreted by Resident Radiologist, Karan Perdomo MD.     XR FOOT 3+ VW RIGHT, XR ANKLE 3+ VW RIGHT    Result Date: 9/27/2021  Narrative: XR FOOT 3+ VW RIGHT, XR ANKLE 3+ VW RIGHT DATE:  9/27/2021 2:47 PM INDICATION:  Pain in right foot, History of falling. COMPARISON:  None. FINDINGS:  There is mildly displaced, spiral fracture of the distal fibula that terminates inferiorly at the joint line. There is a mild degree of lateral displacement of the distal fragment. The ankle mortise and distal syndesmosis remains intact. Ankle joint space is preserved. Images of the right foot demonstrate no fracture. The probably flexed position of the toes and overlap of the first and second toes does limit detail. There is marked hallux valgus deformity and metatarsus primus  varus. The medially displaced first metatarsal head demonstrates prominent hypertrophy. The hypertrophied first MTP sesamoids are rotated laterally and hypertrophy. Osteophytic changes also involve the interphalangeal joints. Demineralization is evident. A hypertrophic enthesophyte projects from the plantar calcaneus.     Impression: IMPRESSION:  1. Mildly displaced spiral fracture of the distal fibula and terminating inferiorly at the joint line. 2. No acute fracture involving the right foot. 3. Prominent bunion deformity of the right first MTP joint with additional hammertoe deformities.     XR ANKLE 1 VW RIGHT    Result Date: 10/1/2021  Narrative: EXAM:  XR ANKLE 1 VW RIGHT DATE OF EXAM:  9/29/2021 11:16 AM. HISTORY:  Other fracture of right lower leg, initial encounter for closed fracture. COMPARISON:  9/27/2021. FINDINGS:  AP gravity stress view. There is an oblique fracture of the distal fibula. There is very subtle medial widening of the tibiotalar joint. There is some medial lateral soft tissue swelling.     Impression: IMPRESSION:  Very mild medial widening of the tibiotalar joint.     CT CERVICAL SPINE WO CONTRAST    Result Date: 10/25/2021  Narrative: EXAM: CT CERVICAL SPINE WO CONTRAST HISTORY: Neck trauma (Age >= 65y) COMPARISON: None. TECHNIQUE: Multidetector spiral axial images of the cervical spine were obtained. Soft tissue and bone photography. From this data, reformatted coronal and sagittal images are generated. FINDINGS: No fractures or dislocation. Moderate disc space narrowing of C3 over C4. Severe disc space narrowing at C4-5 and C5-6. Moderate disc space narrowing at C6-7. Scattered facet and uncovertebral joint hypertrophic changes.     Impression: IMPRESSION: Degenerative disc and joint disease changes. No acute pathology.     CT CHEST ABDOMEN PELVIS WO CONTRAST    Result Date: 10/25/2021  Narrative: EXAM: CT CHEST ABDOMEN PELVIS WO CONTRAST HISTORY: From order: Chest-abdomen-pelvis  trauma, blunt CONTRAST: None. COMPARISON: None. This CT exam was performed using one or more of the following dose reduction techniques: Automated exposure control, adjustment of the mA and/or KV according to patient size, and/or use of iterative reconstructive technique. Chest findings: Clear lungs. No effusion or pneumothorax. Eventration anteriorly right diaphragm. No lung mass or nodule. Atherosclerotic vascular calcifications. Normal heart size. No esophageal dilation. No adenopathy. Degenerative spurring thoracic spine without compression fracture. Offset deformity sternum without lucency appears to be healed remote fracture no acute displaced rib fractures. ABDOMEN FINDINGS: Intact major viscera without laceration or contusion Liver, Spleen and Pancreas: No acute changes; no masses. Gallbladder: There are small gallstones without inflammation. . Kidneys and Adrenal Glands: No stones or masses. Aorta and Retroperitoneum: No aneurysm or adenopathy. Colonic constipation Bowel: Normal, without dilation or wall thickening. No free air or fluid. Appendix: Normal. PELVIS FINDINGS: There are artifacts related to right hip metal. The bladder is distended appearing normal uterus is absent. The ovaries are not seen No free fluid or pelvic sidewall mass. No inguinal mass or hernia. lumbar fusion and laminectomy at lower levels with grade 2 spondylolisthesis L5-S1, no compression fracture. No pelvic fracture. Similar subluxation of L5-S1 on comparison x-ray lumbar spine October 21     Impression: IMPRESSION:  No acute trauma related deformity Colonic constipation Lumbar fusion with L5-S1 anterolisthesis appearing stable There is cholelithiasis.     CT HEAD WO CONTRAST    Result Date: 10/25/2021  Narrative: EXAM: CT HEAD WO CONTRAST CLINICAL INFORMATION: Head trauma, minor (Age >= 65y) COMPARISON:  None available. TECHNIQUE:  Routine noncontrast head CT spanning cranial vertex through foramen magnum. Coronal and sagittal  reformats. FINDINGS:  No acute intracranial hemorrhage, mass effect, midline shift, or abnormal extraaxial fluid collection is visualized. Size and configuration of the ventricles and sulci are within normal limits for age. Findings of generalized atrophy. Prominence of the ventricles and sulci over the convexities. Diminished areas of attenuation involving the periventricular and subcortical white matter of the cerebral hemispheres bilaterally, most consistent with chronic microvascular ischemia. Intracranial atherosclerotic vascular calcifications are present. No CT evidence of an evolving infarct in a major vascular territory, although MRI is more sensitive for the detection of acute ischemia.  No air fluid levels are visualized within the imaged portions of the paranasal sinuses or mastoid air cells.     Impression: IMPRESSION:  No acute intracranial bleed is visualized. There is no midline shift.  No abnormal extra-axial fluid collection is identified.      I personally reviewed all recent imaging      Assessment & Plan   ASSESSMENT AND PLAN  Syeda Justice is a 89 year old female admitted for:    # Generalized weakness  # Multiple falls  # Back and buttock pain  - Orthostatics negative  - Pain control  - PT/OT - rec subacute rehab, SW following    # Uncontrolled blood pressure, improved  - Resume home meds, monitor, use hydralazine PRN    # Type 2 diabetes mellitus-controlled  - sliding scale insulin, monitor glucose    # Recent Mildly displaced spiral fracture of the distal fibula and terminatinginferiorly at the joint line.  - Ortho boot in place  - Pain control  - PT/OT    # History of spinal canal stenosis.   # History of Lumbar fusion with L5-S1 anterolisthesis appearing  - No neurological symptoms at this time suggesting spinal cord compression  - Monitor    # Memory problems  - Dementia?  -c ompetency evaluation deemed her non competent, activated POA  - Will need outpatient follow up for formal  neurocog testing    # Constipation  - Bowel regimen    # Hypomagnesemia  - Electrolyte protocol    # GERD, hypertension, hyperlipidemia, hypothyroid, osteoarthritis, osteopo, lower extremity edema  - Home meds    # Dispo  - Dispo pending pain control and placement. SW following.     Discussed plan in detail with nurse.     Diet:   Dietary Orders (From admission, onward)             Start     Ordered    10/26/21 1805  Regular Diet  DIET EFFECTIVE NOW        Question:  Diet Modifiers  Answer:  Regular    10/26/21 1804                DVT ppx:  Current Active Medications for DVT Prophylaxis (From admission, onward)         Stop     enoxaparin (LOVENOX) injection 40 mg  40 mg,   Subcutaneous,   DAILY         --              Code:   Code Status: Full Resuscitation    Presley Simpson MD  Hospitalist - Internal Medicine/Pediatrics  Pager - [phone]  From 7pm to 7am,  please contact the Hospitalist on call: [phone]

## 2023-12-25 ENCOUNTER — HOSPITAL ENCOUNTER (EMERGENCY)
Facility: HOSPITAL | Age: 23
Discharge: HOME | End: 2023-12-25
Attending: EMERGENCY MEDICINE
Payer: COMMERCIAL

## 2023-12-25 ENCOUNTER — APPOINTMENT (EMERGENCY)
Dept: CT IMAGING | Facility: HOSPITAL | Age: 23
End: 2023-12-25
Payer: COMMERCIAL

## 2023-12-25 VITALS
DIASTOLIC BLOOD PRESSURE: 85 MMHG | SYSTOLIC BLOOD PRESSURE: 119 MMHG | BODY MASS INDEX: 23.9 KG/M2 | RESPIRATION RATE: 18 BRPM | HEART RATE: 130 BPM | HEIGHT: 64 IN | WEIGHT: 140 LBS | OXYGEN SATURATION: 99 % | TEMPERATURE: 98.1 F

## 2023-12-25 DIAGNOSIS — S40.261A TICK BITE OF RIGHT SHOULDER, INITIAL ENCOUNTER: ICD-10-CM
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
## 2023-12-25 DIAGNOSIS — G90.A POTS (POSTURAL ORTHOSTATIC TACHYCARDIA SYNDROME): ICD-10-CM
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
## 2023-12-25 DIAGNOSIS — R00.0 SINUS TACHYCARDIA: ICD-10-CM

## 2023-12-25 LAB
ATRIAL RATE: 129 BPM
EXT PREGNANCY TEST URINE: NEGATIVE
EXT. CONTROL: NORMAL
P AXIS: 71 DEGREES
PR INTERVAL: 142 MS
QRS AXIS: 87 DEGREES
QRSD INTERVAL: 70 MS
QT INTERVAL: 290 MS
QTC INTERVAL: 424 MS
T WAVE AXIS: 53 DEGREES
VENTRICULAR RATE: 129 BPM

## 2023-12-25 PROCEDURE — 93005 ELECTROCARDIOGRAM TRACING: CPT

## 2023-12-25 PROCEDURE — 99285 EMERGENCY DEPT VISIT HI MDM: CPT | Performed by: EMERGENCY MEDICINE

## 2023-12-25 PROCEDURE — G1004 CDSM NDSC: HCPCS

## 2023-12-25 PROCEDURE — 99284 EMERGENCY DEPT VISIT MOD MDM: CPT

## 2023-12-25 PROCEDURE — 81025 URINE PREGNANCY TEST: CPT | Performed by: EMERGENCY MEDICINE

## 2023-12-25 PROCEDURE — 71250 CT THORAX DX C-: CPT

## 2023-12-25 PROCEDURE — 74176 CT ABD & PELVIS W/O CONTRAST: CPT

## 2023-12-25 PROCEDURE — 70450 CT HEAD/BRAIN W/O DYE: CPT

## 2023-12-25 RX ORDER — ACETAMINOPHEN 325 MG/1
975 TABLET ORAL ONCE
Status: COMPLETED | OUTPATIENT
Start: 2023-12-25 | End: 2023-12-25

## 2023-12-25 RX ORDER — DOXYCYCLINE HYCLATE 100 MG/1
200 CAPSULE ORAL ONCE
Status: COMPLETED | OUTPATIENT
Start: 2023-12-25 | End: 2023-12-25

## 2023-12-25 RX ADMIN — DOXYCYCLINE 200 MG: 100 CAPSULE ORAL at 17:23

## 2023-12-25 RX ADMIN — ACETAMINOPHEN 975 MG: 325 TABLET, FILM COATED ORAL at 17:43
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
## 2024-02-06 ENCOUNTER — HOSPITAL ENCOUNTER (EMERGENCY)
Facility: HOSPITAL | Age: 24
Discharge: HOME | End: 2024-02-06
Attending: EMERGENCY MEDICINE
Payer: COMMERCIAL

## 2024-02-06 ENCOUNTER — TELEPHONE (OUTPATIENT)
Dept: FAMILY MEDICINE | Facility: CLINIC | Age: 24
End: 2024-02-06
Payer: COMMERCIAL

## 2024-02-06 VITALS
RESPIRATION RATE: 18 BRPM | SYSTOLIC BLOOD PRESSURE: 109 MMHG | BODY MASS INDEX: 24.75 KG/M2 | HEART RATE: 85 BPM | TEMPERATURE: 97.9 F | HEIGHT: 64 IN | OXYGEN SATURATION: 99 % | DIASTOLIC BLOOD PRESSURE: 62 MMHG | WEIGHT: 145 LBS

## 2024-02-06 DIAGNOSIS — L03.031 PARONYCHIA OF SECOND TOE, RIGHT: Primary | ICD-10-CM

## 2024-02-06 PROCEDURE — 63700000 HC SELF-ADMINISTRABLE DRUG: Performed by: EMERGENCY MEDICINE

## 2024-02-06 PROCEDURE — 99283 EMERGENCY DEPT VISIT LOW MDM: CPT | Mod: U5

## 2024-02-06 RX ORDER — CEPHALEXIN 500 MG/1
500 CAPSULE ORAL ONCE
Status: COMPLETED | OUTPATIENT
Start: 2024-02-06 | End: 2024-02-06

## 2024-02-06 RX ORDER — CEPHALEXIN 500 MG/1
500 CAPSULE ORAL 2 TIMES DAILY
Qty: 10 CAPSULE | Refills: 0 | Status: SHIPPED | OUTPATIENT
Start: 2024-02-06 | End: 2024-02-11

## 2024-02-06 RX ORDER — NAPROXEN SODIUM 550 MG/1
TABLET ORAL
COMMUNITY
Start: 2023-12-26 | End: 2024-07-28

## 2024-02-06 RX ADMIN — CEPHALEXIN 500 MG: 500 CAPSULE ORAL at 04:01

## 2024-02-06 NOTE — ED PROVIDER NOTES
Emergency Medicine Note  HPI   HISTORY OF PRESENT ILLNESS     Patient comes in with concerns for redness to her right toe and states that a small amount of drainage came out            Patient History   PAST HISTORY     Reviewed from Nursing Triage:       Past Medical History:   Diagnosis Date   • Anxiety    • Concussion    • Margo-Danlos syndrome    • Hypoglycemia    • POP (persistent occipitoposterior position)    • POTS (postural orthostatic tachycardia syndrome)    • PTSD (post-traumatic stress disorder)        Past Surgical History:   Procedure Laterality Date   • APPENDECTOMY     • SKIN BIOPSY         Family History   Problem Relation Age of Onset   • Diabetes Biological Mother    • Bipolar disorder Biological Father    • JOSE RAUL disease Biological Father    • No Known Problems Biological Sister         gastroparesis unknown    • Kidney disease Maternal Grandmother    • Hyperlipidemia Maternal Grandmother    • Diabetes Maternal Grandfather    • Heart disease Maternal Grandfather    • Lupus Paternal Grandmother    • Thyroid disease Paternal Grandmother    • Hydrocephalus Paternal Grandmother    • Bipolar disorder Paternal Grandmother    • Atrial fibrillation Paternal Grandfather        Social History     Tobacco Use   • Smoking status: Never   • Smokeless tobacco: Never   Vaping Use   • Vaping Use: Never used   Substance Use Topics   • Alcohol use: Never   • Drug use: Never         Review of Systems   REVIEW OF SYSTEMS     Review of Systems      VITALS     ED Vitals    Date/Time Temp Pulse Resp BP SpO2 [facility]   02/06/24 0335 36.5 °C (97.7 °F) 90 20 120/74 100 % RPF                       Physical Exam   PHYSICAL EXAM     Physical Exam  Vitals and nursing note reviewed.   Constitutional:       General: She is not in acute distress.     Appearance: She is well-developed.   HENT:      Head: Atraumatic.   Eyes:      Conjunctiva/sclera: Conjunctivae normal.   Pulmonary:      Effort: Pulmonary effort is normal.   Skin:      General: Skin is warm and dry.      Comments: Area of redness to the distal toe   Neurological:      General: No focal deficit present.      Mental Status: She is alert.   Psychiatric:         Behavior: Behavior normal.           PROCEDURES     Procedures     DATA     Results     None          Imaging Results    None         No orders to display       Scoring tools                                  ED Course & MDM   MDM / ED COURSE / CLINICAL IMPRESSION / DISPO     Medical Decision Making  Patient has concerns for possible cellulitis versus paronychia no obvious drainage noted.  Recommending discharge on small round of antibiotics    Risk  Prescription drug management.             Clinical Impression      Paronychia of second toe, right     _________________     ED Disposition   Discharge                   Navi Lorenzana MD  02/06/24 9320

## 2024-02-06 NOTE — DISCHARGE INSTRUCTIONS
The paronychia information may include stuff that talks about abscesses and drainage I would recommend using an Epsom salt bath 2 times a day

## 2024-02-08 ENCOUNTER — OFFICE VISIT (OUTPATIENT)
Dept: FAMILY MEDICINE | Facility: CLINIC | Age: 24
End: 2024-02-08
Payer: COMMERCIAL

## 2024-02-08 ENCOUNTER — HOSPITAL ENCOUNTER (EMERGENCY)
Facility: HOSPITAL | Age: 24
Discharge: HOME | End: 2024-02-09
Attending: EMERGENCY MEDICINE
Payer: COMMERCIAL

## 2024-02-08 ENCOUNTER — HOSPITAL ENCOUNTER (OUTPATIENT)
Dept: RADIOLOGY | Age: 24
Discharge: HOME | End: 2024-02-08
Attending: FAMILY MEDICINE
Payer: COMMERCIAL

## 2024-02-08 VITALS
HEIGHT: 64 IN | SYSTOLIC BLOOD PRESSURE: 118 MMHG | DIASTOLIC BLOOD PRESSURE: 86 MMHG | RESPIRATION RATE: 18 BRPM | HEART RATE: 133 BPM | TEMPERATURE: 99.1 F | OXYGEN SATURATION: 97 % | BODY MASS INDEX: 24.59 KG/M2 | WEIGHT: 144 LBS

## 2024-02-08 DIAGNOSIS — J06.9 VIRAL URI WITH COUGH: ICD-10-CM

## 2024-02-08 DIAGNOSIS — J06.9 VIRAL URI WITH COUGH: Primary | ICD-10-CM

## 2024-02-08 DIAGNOSIS — R10.30 LOWER ABDOMINAL PAIN: ICD-10-CM

## 2024-02-08 DIAGNOSIS — Q79.60 EHLERS-DANLOS SYNDROME: ICD-10-CM

## 2024-02-08 DIAGNOSIS — M79.10 MYALGIA: ICD-10-CM

## 2024-02-08 DIAGNOSIS — J10.1 INFLUENZA A: Primary | ICD-10-CM

## 2024-02-08 LAB
ANION GAP SERPL CALC-SCNC: 9 MEQ/L (ref 3–15)
BASOPHILS # BLD: 0.01 K/UL (ref 0.01–0.1)
BASOPHILS NFR BLD: 0.1 %
BILIRUB UR QL STRIP.AUTO: NEGATIVE MG/DL
BUN SERPL-MCNC: 11 MG/DL (ref 7–25)
CALCIUM SERPL-MCNC: 9.2 MG/DL (ref 8.6–10.3)
CHLORIDE SERPL-SCNC: 106 MEQ/L (ref 98–107)
CLARITY UR REFRACT.AUTO: CLEAR
CO2 SERPL-SCNC: 22 MEQ/L (ref 21–31)
COLOR UR AUTO: YELLOW
CREAT SERPL-MCNC: 0.7 MG/DL (ref 0.6–1.2)
D DIMER PPP IA.FEU-MCNC: 0.79 UG/ML FEU (ref 0–0.5)
DIFFERENTIAL METHOD BLD: ABNORMAL
EGFRCR SERPLBLD CKD-EPI 2021: >60 ML/MIN/1.73M*2
EOSINOPHIL # BLD: 0 K/UL (ref 0.04–0.36)
EOSINOPHIL NFR BLD: 0 %
ERYTHROCYTE [DISTWIDTH] IN BLOOD BY AUTOMATED COUNT: 12.5 % (ref 11.7–14.4)
FLUAV RNA SPEC QL NAA+PROBE: POSITIVE
FLUBV RNA SPEC QL NAA+PROBE: NEGATIVE
GLUCOSE BLD-MCNC: 120 MG/DL (ref 70–99)
GLUCOSE SERPL-MCNC: 116 MG/DL (ref 70–99)
GLUCOSE UR STRIP.AUTO-MCNC: NEGATIVE MG/DL
HCG SERPL-ACNC: <0.6 IU/L (MIU/ML)
HCT VFR BLD AUTO: 38.5 % (ref 35–45)
HGB BLD-MCNC: 13 G/DL (ref 11.8–15.7)
HGB UR QL STRIP.AUTO: NEGATIVE
IMM GRANULOCYTES # BLD AUTO: 0.03 K/UL (ref 0–0.08)
IMM GRANULOCYTES NFR BLD AUTO: 0.4 %
KETONES UR STRIP.AUTO-MCNC: NEGATIVE MG/DL
LEUKOCYTE ESTERASE UR QL STRIP.AUTO: NEGATIVE
LIPASE SERPL-CCNC: 11 U/L (ref 11–82)
LYMPHOCYTES # BLD: 0.35 K/UL (ref 1.2–3.5)
LYMPHOCYTES NFR BLD: 5.1 %
MAGNESIUM SERPL-MCNC: 1.7 MG/DL (ref 1.8–2.5)
MCH RBC QN AUTO: 31.6 PG (ref 28–33.2)
MCHC RBC AUTO-ENTMCNC: 33.8 G/DL (ref 32.2–35.5)
MCV RBC AUTO: 93.4 FL (ref 83–98)
MONOCYTES # BLD: 0.61 K/UL (ref 0.28–0.8)
MONOCYTES NFR BLD: 8.9 %
NEUTROPHILS # BLD: 5.85 K/UL (ref 1.7–7)
NEUTS SEG NFR BLD: 85.5 %
NITRITE UR QL STRIP.AUTO: NEGATIVE
NRBC BLD-RTO: 0 %
PDW BLD AUTO: 10.2 FL (ref 9.4–12.3)
PH UR STRIP.AUTO: 6.5 [PH]
PLATELET # BLD AUTO: 196 K/UL (ref 150–369)
POCT TEST: ABNORMAL
POTASSIUM SERPL-SCNC: 3.5 MEQ/L (ref 3.5–5.1)
PROT UR QL STRIP.AUTO: NEGATIVE
RBC # BLD AUTO: 4.12 M/UL (ref 3.93–5.22)
RSV RNA SPEC QL NAA+PROBE: NEGATIVE
SARS-COV-2 RNA RESP QL NAA+PROBE: NEGATIVE
SODIUM SERPL-SCNC: 137 MEQ/L (ref 136–145)
SP GR UR REFRACT.AUTO: 1.01
TSH SERPL DL<=0.05 MIU/L-ACNC: 0.54 MIU/L (ref 0.34–5.6)
UROBILINOGEN UR STRIP-ACNC: 0.2 EU/DL
WBC # BLD AUTO: 6.85 K/UL (ref 3.8–10.5)

## 2024-02-08 PROCEDURE — 3E0333Z INTRODUCTION OF ANTI-INFLAMMATORY INTO PERIPHERAL VEIN, PERCUTANEOUS APPROACH: ICD-10-PCS | Performed by: EMERGENCY MEDICINE

## 2024-02-08 PROCEDURE — 86308 HETEROPHILE ANTIBODY SCREEN: CPT

## 2024-02-08 PROCEDURE — 3008F BODY MASS INDEX DOCD: CPT | Performed by: FAMILY MEDICINE

## 2024-02-08 PROCEDURE — 93005 ELECTROCARDIOGRAM TRACING: CPT | Performed by: EMERGENCY MEDICINE

## 2024-02-08 PROCEDURE — 87637 SARSCOV2&INF A&B&RSV AMP PRB: CPT

## 2024-02-08 PROCEDURE — 96361 HYDRATE IV INFUSION ADD-ON: CPT

## 2024-02-08 PROCEDURE — 3E033NZ INTRODUCTION OF ANALGESICS, HYPNOTICS, SEDATIVES INTO PERIPHERAL VEIN, PERCUTANEOUS APPROACH: ICD-10-PCS | Performed by: EMERGENCY MEDICINE

## 2024-02-08 PROCEDURE — 83735 ASSAY OF MAGNESIUM: CPT

## 2024-02-08 PROCEDURE — 71046 X-RAY EXAM CHEST 2 VIEWS: CPT

## 2024-02-08 PROCEDURE — 25000000 HC PHARMACY GENERAL

## 2024-02-08 PROCEDURE — 83690 ASSAY OF LIPASE: CPT

## 2024-02-08 PROCEDURE — 80048 BASIC METABOLIC PNL TOTAL CA: CPT | Performed by: EMERGENCY MEDICINE

## 2024-02-08 PROCEDURE — 96374 THER/PROPH/DIAG INJ IV PUSH: CPT | Mod: 59

## 2024-02-08 PROCEDURE — 36415 COLL VENOUS BLD VENIPUNCTURE: CPT | Performed by: EMERGENCY MEDICINE

## 2024-02-08 PROCEDURE — 85025 COMPLETE CBC W/AUTO DIFF WBC: CPT | Performed by: EMERGENCY MEDICINE

## 2024-02-08 PROCEDURE — 63700000 HC SELF-ADMINISTRABLE DRUG

## 2024-02-08 PROCEDURE — 99284 EMERGENCY DEPT VISIT MOD MDM: CPT | Mod: 25,U5

## 2024-02-08 PROCEDURE — 84702 CHORIONIC GONADOTROPIN TEST: CPT | Performed by: EMERGENCY MEDICINE

## 2024-02-08 PROCEDURE — 96375 TX/PRO/DX INJ NEW DRUG ADDON: CPT | Mod: 59

## 2024-02-08 PROCEDURE — 85379 FIBRIN DEGRADATION QUANT: CPT

## 2024-02-08 PROCEDURE — 81003 URINALYSIS AUTO W/O SCOPE: CPT | Performed by: EMERGENCY MEDICINE

## 2024-02-08 PROCEDURE — 63600000 HC DRUGS/DETAIL CODE

## 2024-02-08 PROCEDURE — 3E0337Z INTRODUCTION OF ELECTROLYTIC AND WATER BALANCE SUBSTANCE INTO PERIPHERAL VEIN, PERCUTANEOUS APPROACH: ICD-10-PCS | Performed by: EMERGENCY MEDICINE

## 2024-02-08 PROCEDURE — 3E033GC INTRODUCTION OF OTHER THERAPEUTIC SUBSTANCE INTO PERIPHERAL VEIN, PERCUTANEOUS APPROACH: ICD-10-PCS | Performed by: EMERGENCY MEDICINE

## 2024-02-08 PROCEDURE — 99213 OFFICE O/P EST LOW 20 MIN: CPT | Performed by: FAMILY MEDICINE

## 2024-02-08 PROCEDURE — 25800000 HC PHARMACY IV SOLUTIONS

## 2024-02-08 PROCEDURE — 84443 ASSAY THYROID STIM HORMONE: CPT

## 2024-02-08 PROCEDURE — 93005 ELECTROCARDIOGRAM TRACING: CPT

## 2024-02-08 RX ORDER — FLUCONAZOLE 150 MG/1
150 TABLET ORAL DAILY
Qty: 1 TABLET | Refills: 1 | Status: SHIPPED | OUTPATIENT
Start: 2024-02-08 | End: 2024-02-09

## 2024-02-08 RX ORDER — FAMOTIDINE 10 MG/ML
20 INJECTION INTRAVENOUS ONCE
Status: COMPLETED | OUTPATIENT
Start: 2024-02-08 | End: 2024-02-08

## 2024-02-08 RX ORDER — DIPHENHYDRAMINE HCL 50 MG/ML
50 VIAL (ML) INJECTION ONCE
Status: COMPLETED | OUTPATIENT
Start: 2024-02-09 | End: 2024-02-09

## 2024-02-08 RX ORDER — KETOROLAC TROMETHAMINE 15 MG/ML
15 INJECTION, SOLUTION INTRAMUSCULAR; INTRAVENOUS ONCE
Status: COMPLETED | OUTPATIENT
Start: 2024-02-08 | End: 2024-02-08

## 2024-02-08 RX ORDER — AZITHROMYCIN 250 MG/1
TABLET, FILM COATED ORAL
Qty: 6 TABLET | Refills: 0 | Status: SHIPPED | OUTPATIENT
Start: 2024-02-08 | End: 2024-02-13

## 2024-02-08 RX ORDER — ONDANSETRON HYDROCHLORIDE 2 MG/ML
4 INJECTION, SOLUTION INTRAVENOUS ONCE
Status: COMPLETED | OUTPATIENT
Start: 2024-02-08 | End: 2024-02-08

## 2024-02-08 RX ORDER — ERENUMAB-AOOE 140 MG/ML
INJECTION, SOLUTION SUBCUTANEOUS
COMMUNITY
Start: 2024-01-26

## 2024-02-08 RX ORDER — MORPHINE SULFATE 4 MG/ML
4 INJECTION, SOLUTION INTRAMUSCULAR; INTRAVENOUS ONCE
Status: COMPLETED | OUTPATIENT
Start: 2024-02-08 | End: 2024-02-08

## 2024-02-08 RX ORDER — ACETAMINOPHEN 325 MG/1
650 TABLET ORAL ONCE
Status: COMPLETED | OUTPATIENT
Start: 2024-02-08 | End: 2024-02-08

## 2024-02-08 RX ORDER — PROPRANOLOL HYDROCHLORIDE 10 MG/1
TABLET ORAL
COMMUNITY
Start: 2023-12-26

## 2024-02-08 RX ORDER — FLUDROCORTISONE ACETATE 0.1 MG/1
0.1 TABLET ORAL DAILY
COMMUNITY
Start: 2024-02-05 | End: 2024-10-16

## 2024-02-08 RX ORDER — NAPROXEN 500 MG/1
500 TABLET ORAL 2 TIMES DAILY WITH MEALS
Qty: 30 TABLET | Refills: 0 | Status: SHIPPED | OUTPATIENT
Start: 2024-02-08 | End: 2024-07-08

## 2024-02-08 RX ADMIN — KETOROLAC TROMETHAMINE 15 MG: 15 INJECTION, SOLUTION INTRAMUSCULAR; INTRAVENOUS at 21:35

## 2024-02-08 RX ADMIN — SODIUM CHLORIDE 1000 ML: 9 INJECTION, SOLUTION INTRAVENOUS at 23:42

## 2024-02-08 RX ADMIN — MORPHINE SULFATE 4 MG: 4 INJECTION, SOLUTION INTRAMUSCULAR; INTRAVENOUS at 19:46

## 2024-02-08 RX ADMIN — SODIUM CHLORIDE 1000 ML: 9 INJECTION, SOLUTION INTRAVENOUS at 19:46

## 2024-02-08 RX ADMIN — FAMOTIDINE 20 MG: 10 INJECTION, SOLUTION INTRAVENOUS at 22:59

## 2024-02-08 RX ADMIN — ONDANSETRON 4 MG: 2 INJECTION INTRAMUSCULAR; INTRAVENOUS at 19:46

## 2024-02-08 RX ADMIN — HYDROCORTISONE SODIUM SUCCINATE 200 MG: 100 INJECTION, POWDER, FOR SOLUTION INTRAMUSCULAR; INTRAVENOUS at 21:15

## 2024-02-08 RX ADMIN — ACETAMINOPHEN 650 MG: 325 TABLET, FILM COATED ORAL at 19:47

## 2024-02-08 ASSESSMENT — ENCOUNTER SYMPTOMS
APPETITE CHANGE: 1
ABDOMINAL PAIN: 0
DYSURIA: 0
FATIGUE: 1
SORE THROAT: 0
NECK PAIN: 0
LIGHT-HEADEDNESS: 1
DIZZINESS: 0
RHINORRHEA: 1
NAUSEA: 1
SINUS PAIN: 0
FEVER: 0
MYALGIAS: 1
CHILLS: 1
HEADACHES: 1
WHEEZING: 0
DIARRHEA: 0
COUGH: 1
VOMITING: 0

## 2024-02-08 ASSESSMENT — PAIN SCALES - GENERAL: PAINLEVEL: 9

## 2024-02-08 NOTE — PROGRESS NOTES
"  Subjective      Patient ID: Kyaw Harrison is a 23 y.o. female.  2000      URI   This is a new problem. The current episode started yesterday. The problem has been gradually worsening. The maximum temperature recorded prior to her arrival was 100.4 - 100.9 F. The fever has been present for less than 1 day. Associated symptoms include congestion, coughing, headaches, nausea and rhinorrhea. Pertinent negatives include no abdominal pain, chest pain, diarrhea, dysuria, ear pain, joint pain, joint swelling, neck pain, plugged ear sensation, sinus pain, sneezing, sore throat, vomiting or wheezing. She has tried NSAIDs for the symptoms. The treatment provided no relief.     The following have been reviewed and updated as appropriate in this visit:   Allergies  Meds  Problems       Review of Systems   Constitutional: Positive for appetite change, chills and fatigue. Negative for fever.   HENT: Positive for congestion and rhinorrhea. Negative for ear pain, sinus pain, sneezing and sore throat.    Respiratory: Positive for cough. Negative for wheezing.    Cardiovascular: Negative for chest pain.   Gastrointestinal: Positive for nausea. Negative for abdominal pain, diarrhea and vomiting.   Genitourinary: Negative for dysuria.   Musculoskeletal: Positive for myalgias. Negative for joint pain and neck pain.   Neurological: Positive for light-headedness and headaches. Negative for dizziness.       Objective     Vitals:    02/08/24 1412   BP: 118/86   BP Location: Right upper arm   Patient Position: Sitting   Pulse: (!) 133   Resp: 18   Temp: 37.3 °C (99.1 °F)   TempSrc: Tympanic   SpO2: 97%   Weight: 65.3 kg (144 lb)   Height: 1.626 m (5' 4\")     Body mass index is 24.72 kg/m².    Physical Exam  Constitutional:       General: She is not in acute distress.     Appearance: Normal appearance. She is ill-appearing.   HENT:      Right Ear: Tympanic membrane, ear canal and external ear normal.      Left Ear: Tympanic membrane, " ear canal and external ear normal.      Mouth/Throat:      Mouth: Mucous membranes are moist.      Pharynx: Oropharynx is clear. No oropharyngeal exudate or posterior oropharyngeal erythema.   Cardiovascular:      Rate and Rhythm: Normal rate and regular rhythm.      Heart sounds: No murmur heard.     No gallop.   Pulmonary:      Effort: Pulmonary effort is normal. No respiratory distress.      Breath sounds: Normal breath sounds. No wheezing, rhonchi or rales.   Abdominal:      General: Bowel sounds are normal.      Palpations: Abdomen is soft.      Tenderness: There is abdominal tenderness (diffuse). There is no guarding or rebound.   Musculoskeletal:      Cervical back: Neck supple. No tenderness.      Right lower leg: No edema.      Left lower leg: No edema.   Lymphadenopathy:      Cervical: No cervical adenopathy.   Skin:     General: Skin is warm and dry.   Neurological:      Mental Status: She is alert and oriented to person, place, and time.   Psychiatric:         Behavior: Behavior normal.         Assessment/Plan   Diagnoses and all orders for this visit:    Viral URI with cough (Primary)  -     azithromycin (ZITHROMAX) 250 mg tablet; Take 2 tablets the first day, then 1 tablet daily for 4 days.  -     X-RAY CHEST 2 VIEWS; Future  -     COVID-19, FLU A+B AND RSV [facility]; Future    Myalgia  -     naproxen (NAPROSYN) 500 mg tablet; Take 1 tablet (500 mg total) by mouth 2 (two) times a day with meals.    Margo-Danlos syndrome    Other orders  -     fluconazole (DIFLUCAN) 150 mg tablet; Take 1 tablet (150 mg total) by mouth daily for 1 day.      Discussed Supportive management including tylenol or motrin as needed for fever, hydration, and rest.  Concerns raised for bronchitis vs PNA - ordered chest XR and zpak.  Pt usually gets a yeast infection with antibiotics - prescribed difllucan  ED precautions given  fup prn    I spent 20 minutes on this date of service performing the following activities: obtaining  history, performing examination, entering orders, documenting, preparing for visit, obtaining / reviewing records and providing counseling and education.

## 2024-02-08 NOTE — LETTER
February 8, 2024     Patient: Kyaw Harrison  YOB: 2000  Date of Visit: 2/8/2024    To Whom it May Concern:    Kyaw Harrison was seen in my clinic on 2/8/2024 at 2:15 pm. Please excuse Kyaw for her absence from work on this day to make the appointment.    Ms. Harrison is cleared to return to work on Monday, 2/12/2024.    If you have any questions or concerns, please don't hesitate to call.         Sincerely,          Phu Vega, DO        CC: No Recipients

## 2024-02-09 ENCOUNTER — APPOINTMENT (EMERGENCY)
Dept: RADIOLOGY | Facility: HOSPITAL | Age: 24
End: 2024-02-09
Payer: COMMERCIAL

## 2024-02-09 VITALS
DIASTOLIC BLOOD PRESSURE: 69 MMHG | TEMPERATURE: 100.1 F | OXYGEN SATURATION: 96 % | WEIGHT: 144 LBS | HEIGHT: 64 IN | RESPIRATION RATE: 18 BRPM | BODY MASS INDEX: 24.59 KG/M2 | HEART RATE: 102 BPM | SYSTOLIC BLOOD PRESSURE: 103 MMHG

## 2024-02-09 LAB
ATRIAL RATE: 142
FLUAV RNA NPH QL NAA+PROBE: DETECTED
FLUBV RNA NPH QL NAA+PROBE: NOT DETECTED
HETEROPH AB SER QL LA: POSITIVE
LC TEST INFORMATION:: ABNORMAL
P AXIS: 64
PR INTERVAL: 134
QRS DURATION: 68
QT INTERVAL: 274
QTC CALCULATION(BAZETT): 421
R AXIS: 81
RSV RNA NPH QL NAA+PROBE: NOT DETECTED
SARS-COV-2 RNA NPH QL NAA+PROBE: NOT DETECTED
T WAVE AXIS: 29
VENTRICULAR RATE: 142

## 2024-02-09 PROCEDURE — 93010 ELECTROCARDIOGRAM REPORT: CPT | Performed by: INTERNAL MEDICINE

## 2024-02-09 PROCEDURE — 63600000 HC DRUGS/DETAIL CODE: Mod: JZ

## 2024-02-09 PROCEDURE — 71275 CT ANGIOGRAPHY CHEST: CPT

## 2024-02-09 PROCEDURE — 74177 CT ABD & PELVIS W/CONTRAST: CPT

## 2024-02-09 PROCEDURE — 63600105 HC IODINE BASED CONTRAST: Mod: JZ

## 2024-02-09 RX ORDER — IOPAMIDOL 755 MG/ML
100 INJECTION, SOLUTION INTRAVASCULAR
Status: COMPLETED | OUTPATIENT
Start: 2024-02-09 | End: 2024-02-09

## 2024-02-09 RX ADMIN — DIPHENHYDRAMINE HYDROCHLORIDE 50 MG: 50 INJECTION INTRAMUSCULAR; INTRAVENOUS at 00:10

## 2024-02-09 RX ADMIN — IOPAMIDOL 100 ML: 755 INJECTION, SOLUTION INTRAVENOUS at 01:59

## 2024-02-09 ASSESSMENT — VISUAL ACUITY: OU: 1

## 2024-02-09 NOTE — ED PROVIDER NOTES
Emergency Medicine Note  HPI   HISTORY OF PRESENT ILLNESS     Patient is a 23-year-old female with past medical history of POTS, PTSD, Margo-Danlos syndrome, ovarian cyst, concussion, anxiety who presents emergency department for evaluation for abdominal pain.  States that she has had a sore throat since yesterday and developed left lower quadrant abdominal pain today.  Saw her PCP outpatient and was tested for COVID/flu/RSV which is still in process and not yet resulted.  Patient reports fevers and chills with a Tmax of 101 and last took Motrin 800 mg few hours ago naproxen this morning.  Reports nausea without any vomiting.  She does report some chest pain and shortness of breath and a cough with yellow sputum.  Patient is vaccinated for COVID but not flu.  Patient denies any known sick contacts, diarrhea, blood in the stool, headache, vision changes.  Patient denies any personal or family history of blood clots or clotting disorders, personal history of cancer, recent surgery/hospitalization, or use of hormonal medications/supplements.  Does report recent long travel with a car ride to [facility] and back.  Reports history of appendectomy.  Last bowel movement was yesterday and normal.  Last menstrual period-1/18/2024              Patient History   PAST HISTORY     Reviewed from Nursing Triage:       Past Medical History:   Diagnosis Date   • Anxiety    • Concussion    • Margo-Danlos syndrome    • Hypoglycemia    • POP (persistent occipitoposterior position)    • POTS (postural orthostatic tachycardia syndrome)    • PTSD (post-traumatic stress disorder)        Past Surgical History:   Procedure Laterality Date   • APPENDECTOMY     • SKIN BIOPSY         Family History   Problem Relation Age of Onset   • Diabetes Biological Mother    • Bipolar disorder Biological Father    • JOSE RAUL disease Biological Father    • No Known Problems Biological Sister         gastroparesis unknown    • Kidney disease Maternal  Grandmother    • Hyperlipidemia Maternal Grandmother    • Diabetes Maternal Grandfather    • Heart disease Maternal Grandfather    • Lupus Paternal Grandmother    • Thyroid disease Paternal Grandmother    • Hydrocephalus Paternal Grandmother    • Bipolar disorder Paternal Grandmother    • Atrial fibrillation Paternal Grandfather        Social History     Tobacco Use   • Smoking status: Never   • Smokeless tobacco: Never   Vaping Use   • Vaping Use: Never used   Substance Use Topics   • Alcohol use: Never   • Drug use: Never         Review of Systems   REVIEW OF SYSTEMS     Review of Systems      VITALS     ED Vitals    Date/Time Temp Pulse Resp BP SpO2 Who   02/09/24 0302 -- 102 18 103/69 96 % EK   02/09/24 0254 -- 103 18 103/71 -- DMA   02/09/24 0242 -- 90 20 86/55 99 % DMA   02/09/24 0232 -- 90 26 82/52 98 % DMA   02/09/24 0218 -- 97 28 91/56 97 % DMA   02/09/24 0215 -- 89 16 79/48 99 % DMA   02/09/24 0200 -- 92 20 102/64 99 % DMA   02/09/24 0059 -- 96 16 -- 97 % DMA   02/09/24 0000 -- 118 18 -- 99 % DMA   02/08/24 2300 -- 112 22 114/74 97 % DMA   02/08/24 2200 -- 118 17 -- 97 % DMA   02/08/24 2100 -- 120 16 114/74 96 % DMA   02/08/24 1946 37.8 °C (100.1 °F) -- -- -- -- DMA   02/08/24 1859 38.2 °C (100.7 °F) 135 20 117/70 97 % JLS   02/08/24 0200 -- 130 20 117/69 -- DMA                       Physical Exam   PHYSICAL EXAM     Physical Exam  Vitals and nursing note reviewed.   Constitutional:       Appearance: She is normal weight. She is not ill-appearing, toxic-appearing or diaphoretic.   HENT:      Head: Normocephalic and atraumatic.      Mouth/Throat:      Mouth: Mucous membranes are moist.      Pharynx: Oropharynx is clear. No oropharyngeal exudate or posterior oropharyngeal erythema.   Eyes:      General: Lids are normal. Vision grossly intact. Gaze aligned appropriately. No scleral icterus.     Extraocular Movements: Extraocular movements intact.      Conjunctiva/sclera: Conjunctivae normal.      Pupils:  Pupils are equal, round, and reactive to light.   Cardiovascular:      Rate and Rhythm: Regular rhythm. Tachycardia present.      Pulses: Normal pulses.      Heart sounds: Normal heart sounds. No murmur heard.  Pulmonary:      Effort: Pulmonary effort is normal. No respiratory distress.      Breath sounds: Normal breath sounds.   Chest:      Chest wall: No tenderness.   Abdominal:      General: Abdomen is protuberant. Bowel sounds are normal. There is no distension.      Palpations: Abdomen is soft.      Tenderness: There is abdominal tenderness in the left lower quadrant. There is no guarding or rebound.   Musculoskeletal:         General: No tenderness.      Cervical back: Full passive range of motion without pain, normal range of motion and neck supple. No rigidity or crepitus. No pain with movement, spinous process tenderness or muscular tenderness. Normal range of motion.      Right lower leg: No edema.      Left lower leg: No edema.   Skin:     General: Skin is warm and dry.      Capillary Refill: Capillary refill takes less than 2 seconds.   Neurological:      General: No focal deficit present.      Mental Status: She is alert and oriented to person, place, and time.      GCS: GCS eye subscore is 4. GCS verbal subscore is 5. GCS motor subscore is 6.      Cranial Nerves: Cranial nerves 2-12 are intact.      Sensory: Sensation is intact.      Motor: Motor function is intact.      Coordination: Coordination is intact.      Gait: Gait is intact.   Psychiatric:         Mood and Affect: Mood is anxious.           PROCEDURES     Procedures     DATA     Results     Procedure Component Value Units Date/Time    Heterophile AB Reflex EBV [[id number]]  (Abnormal) Collected: 02/08/24 1911    Specimen: Blood, Venous Updated: 02/09/24 0736     Heterophile Antibody Positive     Comment: Andres Barr Antibody Evaluation Not Indicated.       Narrative:      Manual Entry Confirmed     [place] Draw Panel [[id number]] Collected:  02/08/24 1911    Specimen: Blood, Venous Updated: 02/09/24 0400    Narrative:      The following orders were created for panel order Max Draw Panel.  Procedure                               Abnormality         Status                     ---------                               -----------         ------                     RAINBOW RED[[id number]]                                      In process                 RAINBOW LT BLUE[[id number]]                                  In process                 RAINBOW SONIA[[id number]]                                     Final result                 Please view results for these tests on the individual orders.    MICHAEL GOLD [[id number]] Collected: 02/08/24 1911    Specimen: Blood, Venous Updated: 02/09/24 0400    SARS-COV-2 (COVID-19)/ FLU A/B, AND RSV, PCR Nasopharynx [[id number]]  (Abnormal) Collected: 02/08/24 1954    Specimen: Nasopharyngeal Swab from Nasopharynx Updated: 02/08/24 2129     SARS-CoV-2 (COVID-19) Negative     Influenza A Positive     Influenza B Negative     Respiratory Syncytial Virus Negative    Narrative:      Testing performed using real-time PCR for detection of COVID-19. EUA approved validation studies performed on site.     TSH w reflex FT4 [[id number]]  (Normal) Collected: 02/08/24 1911    Specimen: Blood, Venous Updated: 02/08/24 2025     TSH 0.54 mIU/L     D-dimer, quantitative [[id number]]  (Abnormal) Collected: 02/08/24 1911    Specimen: Blood, Venous Updated: 02/08/24 2015     D-Dimer, Quant 0.79 ug/mL FEU      Comment: The D-Dimer assay can be used as an aid in the diagnosis of DVT or PE. The test can not be used by itself to exclude DVT or PE. When used as a diagnostic aid, the cutoff value is the same as the reference range: <0.5 ug/ml FEU.       Magnesium [[id number]]  (Abnormal) Collected: 02/08/24 1911    Specimen: Blood, Venous Updated: 02/08/24 2008     Magnesium 1.7 mg/dL     Lipase [[id number]]  (Normal) Collected: 02/08/24 1911    Specimen:  Blood, Venous Updated: 02/08/24 2008     Lipase 11 U/L     BhCG, Serum, Quant [[id number]]  (Normal) Collected: 02/08/24 1911    Specimen: Blood, Venous Updated: 02/08/24 2005     hCG Quant <0.6 IU/L (mIU/mL)     Basic metabolic panel [[id number]]  (Abnormal) Collected: 02/08/24 1911    Specimen: Blood, Venous Updated: 02/08/24 1952     Sodium 137 mEQ/L      Potassium 3.5 mEQ/L      Comment: Results obtained on plasma. Plasma Potassium values may be up to 0.4 mEQ/L less than serum values. The differences may be greater for patients with high platelet or white cell counts.        Chloride 106 mEQ/L      CO2 22 mEQ/L      BUN 11 mg/dL      Creatinine 0.7 mg/dL      Glucose 116 mg/dL      Calcium 9.2 mg/dL      eGFR >60.0 mL/min/1.73m*2      Comment: Calculation based on the Chronic Kidney Disease Epidemiology Collaboration (CKD-EPI) equation refit without adjustment for race.        Anion Gap 9 mEQ/L     UA w/ reflex culture (ED Only) [[id number]]  (Normal) Collected: 02/08/24 1921    Specimen: Urine, Clean Catch Updated: 02/08/24 1932    Narrative:      The following orders were created for panel order UA w/ reflex culture (ED Only).  Procedure                               Abnormality         Status                     ---------                               -----------         ------                     UA Reflex to Culture (Ma...[[id number]]  Normal              Final result                 Please view results for these tests on the individual orders.    UA Reflex to Culture (Macroscopic) [[id number]]  (Normal) Collected: 02/08/24 1921    Specimen: Urine, Clean Catch Updated: 02/08/24 1932     Color, Urine Yellow     Clarity, Urine Clear     Specific Gravity, Urine 1.013     pH, Urine 6.5     Leukocyte Esterase Negative     Comment: Results can be falsely negative due to high specific gravity, some antibiotics, glucose >3 g/dl, or WBC other than neutrophils.        Nitrite, Urine Negative     Protein, Urine Negative      Glucose, Urine Negative mg/dL      Ketones, Urine Negative mg/dL      Urobilinogen, Urine 0.2 EU/dL      Bilirubin, Urine Negative mg/dL      Blood, Urine Negative     Comment: The sensitivity of the occult blood test is equivalent to approximately 4 intact RBC/HPF.       CBC and differential [[id number]]  (Abnormal) Collected: 02/08/24 1911    Specimen: Blood, Venous Updated: 02/08/24 1932     WBC 6.85 K/uL      RBC 4.12 M/uL      Hemoglobin 13.0 g/dL      Hematocrit 38.5 %      MCV 93.4 fL      MCH 31.6 pg      MCHC 33.8 g/dL      RDW 12.5 %      Platelets 196 K/uL      MPV 10.2 fL      Differential Type Auto     nRBC 0.0 %      Immature Granulocytes 0.4 %      Neutrophils 85.5 %      Lymphocytes 5.1 %      Monocytes 8.9 %      Eosinophils 0.0 %      Basophils 0.1 %      Immature Granulocytes, Absolute 0.03 K/uL      Neutrophils, Absolute 5.85 K/uL      Lymphocytes, Absolute 0.35 K/uL      Monocytes, Absolute 0.61 K/uL      Eosinophils, Absolute 0.00 K/uL      Basophils, Absolute 0.01 K/uL     RAINBOW RED [[id number]] Collected: 02/08/24 1911    Specimen: Blood, Venous Updated: 02/08/24 1927    RAINBOW LT BLUE [[id number]] Collected: 02/08/24 1911    Specimen: Blood, Venous Updated: 02/08/24 1926          Imaging Results          CT ANGIOGRAPHY CHEST PULMONARY EMBOLISM WITH IV CONTRAST (Final result)  Result time 02/09/24 09:13:57    Final result                 Impression:    IMPRESSION:  No evidence of acute pulmonary arterial embolism.  No acute pathology within the  chest, abdomen or pelvis.                   Narrative:    CLINICAL HISTORY: Abdominal pain chest pain and shortness of breath.    COMPARISON: CT PE 11/28/2022 and pelvic ultrasound 11/3/2022.    COMMENT:    TECHNIQUE: CT of the chest, abdomen and pelvis was performed after the  uneventful administration of intravenous contrast with the patient in the supine  position. Images reconstructed/reformatted in the axial, coronal and  sagittal  planes. 3-D  RECONSTRUCTION: Additional 3-D reconstructions were performed.  Images acquired as per CT angiogram protocol.  CT DOSE:  One or more dose reduction techniques (e.g. automated exposure  control, adjustment of the mA and/or kV according to patient size, use of  iterative reconstruction technique) utilized for this examination.    100mL of iopamidoL (ISOVUE-370) 370 mg iodine /mL (76 %) injection 100 mL          CHEST  CHEST WALL AND LOWER NECK: within normal limits  MEDIASTINUM AND INNA: within normal limits  HEART: Normal in size. No pericardial effusion.  VESSELS: There is no evidence of an acute pulmonary arterial embolism.  The  thoracic and abdominal aorta are unremarkable without aneurysmal dilatation or  dissection.  LUNG , LARGE AIRWAYS AND PLEURA: Trachea and central bronchi are patent.  There  is no focal airspace consolidation, pleural effusion or pneumothorax.  There is  stable appearance of 4 mm solid pulmonary nodules within the left lower lobe  (series 4 image 139) and within the right lower lobe (series 4 image 143) since  the previous exam of 2022, likely benign.    BONES: within normal limits    ABDOMEN AND PELVIS  LIVER: within normal limits  BILE DUCTS: normal caliber  GALLBLADDER: Normal  PANCREAS: within normal limits  SPLEEN: within normal limits  ADRENALS: within normal limits  KIDNEYS/URETERS/BLADDER: within normal limits    REPRODUCTIVE ORGANS: no pelvic masses    BOWEL: There is no bowel wall thickening or adjacent inflammation. Normal bowel  caliber without evidence of obstruction.  The appendix is not seen and is likely  surgically absent as there are surgical clips noted along the wall of the cecum.  There is no inflammation noted adjacent cecum.  no ascites or free air, no fluid collection.  ABDOMINAL LYMPH NODES: within normal limits.  ABDOMINAL WALL: within normal limits                               CT ABDOMEN PELVIS WITH IV CONTRAST (Final result)  Result time 02/09/24 09:13:57     Final result                 Impression:    IMPRESSION:  No evidence of acute pulmonary arterial embolism.  No acute pathology within the  chest, abdomen or pelvis.                   Narrative:    CLINICAL HISTORY: Abdominal pain chest pain and shortness of breath.    COMPARISON: CT PE 11/28/2022 and pelvic ultrasound 11/3/2022.    COMMENT:    TECHNIQUE: CT of the chest, abdomen and pelvis was performed after the  uneventful administration of intravenous contrast with the patient in the supine  position. Images reconstructed/reformatted in the axial, coronal and  sagittal  planes. 3-D RECONSTRUCTION: Additional 3-D reconstructions were performed.  Images acquired as per CT angiogram protocol.  CT DOSE:  One or more dose reduction techniques (e.g. automated exposure  control, adjustment of the mA and/or kV according to patient size, use of  iterative reconstruction technique) utilized for this examination.    100mL of iopamidoL (ISOVUE-370) 370 mg iodine /mL (76 %) injection 100 mL          CHEST  CHEST WALL AND LOWER NECK: within normal limits  MEDIASTINUM AND INNA: within normal limits  HEART: Normal in size. No pericardial effusion.  VESSELS: There is no evidence of an acute pulmonary arterial embolism.  The  thoracic and abdominal aorta are unremarkable without aneurysmal dilatation or  dissection.  LUNG , LARGE AIRWAYS AND PLEURA: Trachea and central bronchi are patent.  There  is no focal airspace consolidation, pleural effusion or pneumothorax.  There is  stable appearance of 4 mm solid pulmonary nodules within the left lower lobe  (series 4 image 139) and within the right lower lobe (series 4 image 143) since  the previous exam of 2022, likely benign.    BONES: within normal limits    ABDOMEN AND PELVIS  LIVER: within normal limits  BILE DUCTS: normal caliber  GALLBLADDER: Normal  PANCREAS: within normal limits  SPLEEN: within normal limits  ADRENALS: within normal limits  KIDNEYS/URETERS/BLADDER: within  normal limits    REPRODUCTIVE ORGANS: no pelvic masses    BOWEL: There is no bowel wall thickening or adjacent inflammation. Normal bowel  caliber without evidence of obstruction.  The appendix is not seen and is likely  surgically absent as there are surgical clips noted along the wall of the cecum.  There is no inflammation noted adjacent cecum.  no ascites or free air, no fluid collection.  ABDOMINAL LYMPH NODES: within normal limits.  ABDOMINAL WALL: within normal limits                                ECG 12 lead   Independent Interpretation by ED Provider   Rhythm: Sinus tachycardia  Rate: 142 bpm  P waves: [normal interval]  QRS: [normal QRS]  Axis: [normal]  ST Segments: [no obvious ST elevation or ischemia]          Scoring tools                                  ED Course & MDM   MDM / ED COURSE / CLINICAL IMPRESSION / DISPO     Medical Decision Making  Patient is a 23-year-old female with past medical history of POTS, PTSD, Margo-Danlos syndrome, ovarian cyst, concussion, anxiety who presents emergency department for evaluation for abdominal pain.  States that she has had a sore throat since yesterday and developed left lower quadrant abdominal pain today.  Saw her PCP outpatient and was tested for COVID/flu/RSV which is still in process and not yet resulted.  Patient reports fevers and chills with a Tmax of 101 and last took Motrin 800 mg few hours ago naproxen this morning.  Reports nausea without any vomiting.  She does report some chest pain and shortness of breath and a cough with yellow sputum.  Patient is vaccinated for COVID but not flu.  Patient denies any known sick contacts, diarrhea, blood in the stool, headache, vision changes.  Patient denies any personal or family history of blood clots or clotting disorders, personal history of cancer, recent surgery/hospitalization, or use of hormonal medications/supplements.  Does report recent long travel with a car ride to [place] and back.   Reports history of appendectomy.  Last bowel movement was yesterday and normal.  Last menstrual period-1/18/2024    Vital Signs Review: Vital signs have been reviewed. The oxygen saturation is SpO2: 97 % which is normal.        Influenza A: acute illness or injury  Lower abdominal pain: acute illness or injury  Amount and/or Complexity of Data Reviewed  Independent Historian: parent and friend  External Data Reviewed: labs.  Labs: ordered. Decision-making details documented in ED Course.  Radiology: ordered.  ECG/medicine tests: ordered and independent interpretation performed.      Risk  OTC drugs.  Prescription drug management.          ED Course as of 02/09/24 2200   Thu Feb 08, 2024 1956 Discussed with ED attending, Dr. Mitchell, who is in agreement with plan. Also saw/evaluated patient [GS]   2025 D-Dimer, Quant(!): 0.79  Patient with history of IV contrast allergy.  States that she developed a rash on her arm and had a tingling sensation in her throat when receiving IV contrast when she previously had no issue.  However since that time she has had no issue with premedication with Benadryl. [GS]   2130 Influenza A(!): Positive [GS]   2341 Patient was reevaluated and is feeling somewhat improved.  Will give some additional IV fluids.  Made aware that we are waiting on the prep.  Patient states that her symptoms began a week ago so would not be a candidate for Tamiflu [GS]   2345 10:55 AM.  Change of shift.  Discussed case with Dr. Fragoso, and provided instructions to follow-up on pending test results, re-evaluate the patient, and make the appropriate disposition accordingly.  They will assume care of the patient at this time.  Awaiting CT imaging [GS]   Fri Feb 09, 2024   0822 + [facility]. I called and left a message with the patient to share the result. She was flu + so this is likely old.  [SB]      ED Course User Index  [GS] Jade Trejo PA C  [SB] Stan Evans MD     Clinical Impression       Influenza A   Lower abdominal pain     _________________     ED Disposition   Discharge                   Jade Trejo PA C  02/09/24 7104

## 2024-02-09 NOTE — ED ATTESTATION NOTE
Procedures  Physical Exam  Review of Systems    2/8/20247:35 PM  I have personally seen and examined the patient.  I personally performed the key   components of the encounter and provided a substantive portion of the care and   medical decision making for this patient.     I have reviewed and agree with the PA/NP/Resident's assessment and ED plan of care, with any exceptions as documented below.  My examination, assessment and plan of care of Kyaw Harrison is as follows:    Patient is a 23-year-old female who presents with lower abdominal discomfort, patient has had URI symptoms over the last several days, tonight she developed left flank discomfort which radiated into her abdomen, patient felt nauseated, the abdominal discomfort has improved somewhat but is still present currently, patient is running a low-grade fever in the emergency department as well, patient has had an appendectomy in the past    Exam:   Vital signs have been reviewed, pulse ox is 97% on room air, normal  Heart: RRR, normal S1/S2  Lungs: CTA bilaterally  Abdo: soft, tender in the left lower quadrant    Plan/Medical Decision Making: Patient is a 23-year-old female who presents with lower abdominal discomfort, checking labs and imaging, reevaluate afterwards      This document was created using Dragon Dictation software. There might be some typographical errors due to this technology.          Godshall, Duane K, MD  02/08/24 1938

## 2024-02-09 NOTE — DISCHARGE INSTRUCTIONS
You were seen and evaluated the emergency department today for abdominal pain.  Your influenza a came back positive.  Due to your symptom onset, you are not a candidate for Tamiflu.    Make sure that you are getting plenty of rest. Make sure that you are staying hydrated and drinking lots of fluids.  Please alternate water and electrolyte solution such as Gatorade or Pedialyte.    You may take Tylenol or Motrin as needed for pain as directed on the bottle.  Please do not exceed 3 g of Tylenol in 24 hours.    Please follow-up with your PCP next few days    Return to the emergency department for any worsening symptoms

## 2024-02-15 ENCOUNTER — TELEPHONE (OUTPATIENT)
Dept: EMERGENCY MEDICINE | Facility: HOSPITAL | Age: 24
End: 2024-02-15
Payer: COMMERCIAL

## 2024-02-27 ENCOUNTER — TELEPHONE (OUTPATIENT)
Dept: FAMILY MEDICINE | Facility: CLINIC | Age: 24
End: 2024-02-27
Payer: COMMERCIAL

## 2024-02-27 NOTE — TELEPHONE ENCOUNTER
Spoke with pt. States she fell forward and hit the top of her head on Sunday. No LOC at time of injury. States she passed out several times after hitting her head and was having low BS in the 50's. Pt has hx of POTS and hypoglycemia. No syncope or low sugar since Sunday but she has been feeling unwell. C/o back/neck pain, nausea, fatigue, dizziness. Her mother noted that her pupils are uneven today. Advised pt that she needs to seek care at ED now. Jaquan ROTHMAN

## 2024-02-27 NOTE — TELEPHONE ENCOUNTER
Patient called stating that on Sunday she fell from a kneeling position and hit her head. Her blood sugar dropped to the 50s. She states she kept passing out. Yesterday and today she has felt nauseous and is sore. Please advise. Nurse unavailable at time of triage.    Patient can be reached at [phone].    Thank you!  Krys

## 2024-03-01 ENCOUNTER — OFFICE VISIT (OUTPATIENT)
Dept: FAMILY MEDICINE | Facility: CLINIC | Age: 24
End: 2024-03-01
Payer: COMMERCIAL

## 2024-03-01 VITALS
TEMPERATURE: 97.9 F | OXYGEN SATURATION: 99 % | RESPIRATION RATE: 14 BRPM | SYSTOLIC BLOOD PRESSURE: 102 MMHG | HEART RATE: 104 BPM | BODY MASS INDEX: 25.57 KG/M2 | HEIGHT: 64 IN | DIASTOLIC BLOOD PRESSURE: 66 MMHG | WEIGHT: 149.8 LBS

## 2024-03-01 DIAGNOSIS — E28.2 PCOS (POLYCYSTIC OVARIAN SYNDROME): ICD-10-CM

## 2024-03-01 DIAGNOSIS — N92.6 MISSED PERIOD: ICD-10-CM

## 2024-03-01 DIAGNOSIS — R53.83 OTHER FATIGUE: ICD-10-CM

## 2024-03-01 DIAGNOSIS — E16.2 HYPOGLYCEMIA: Primary | ICD-10-CM

## 2024-03-01 PROCEDURE — 99214 OFFICE O/P EST MOD 30 MIN: CPT | Performed by: FAMILY MEDICINE

## 2024-03-01 PROCEDURE — 3008F BODY MASS INDEX DOCD: CPT | Performed by: FAMILY MEDICINE

## 2024-03-01 ASSESSMENT — ENCOUNTER SYMPTOMS
DIZZINESS: 1
VOMITING: 0
PALPITATIONS: 0
LIGHT-HEADEDNESS: 0
NAUSEA: 0
DIARRHEA: 0
CHEST TIGHTNESS: 0
FEVER: 0
HEADACHES: 0
CHILLS: 0

## 2024-03-01 NOTE — PROGRESS NOTES
"  Subjective      Patient ID: Kyaw Harrison is a 23 y.o. female.  2000      HPI    Patient with history of POTS presents to the office complaining of fatigue.  Over the weekend, blood sugar dipped down to the 50s and she had a syncopal episode.  She spoke with the office and recommended she go to the emergency room.  Unfortunately, she did not go to the ED. she also has a history of PCOS and currently on metformin.  In addition, she is 2.5 weeks late for her menstrual cycle    The following have been reviewed and updated as appropriate in this visit:   Allergies  Meds  Problems       Review of Systems   Constitutional: Negative for chills and fever.   Respiratory: Negative for chest tightness.    Cardiovascular: Negative for chest pain and palpitations.   Gastrointestinal: Negative for diarrhea, nausea and vomiting.   Neurological: Positive for dizziness. Negative for light-headedness and headaches.       Objective     Vitals:    03/01/24 1346   BP: 102/66   BP Location: Left upper arm   Patient Position: Sitting   Pulse: (!) 104   Resp: 14   Temp: 36.6 °C (97.9 °F)   TempSrc: Temporal   SpO2: 99%   Weight: 67.9 kg (149 lb 12.8 oz)   Height: 1.626 m (5' 4\")     Body mass index is 25.71 kg/m².    Physical Exam  Constitutional:       General: She is not in acute distress.     Appearance: Normal appearance. She is not ill-appearing.   Cardiovascular:      Rate and Rhythm: Normal rate and regular rhythm.      Heart sounds: No murmur heard.     No gallop.   Pulmonary:      Effort: Pulmonary effort is normal. No respiratory distress.      Breath sounds: Normal breath sounds. No wheezing, rhonchi or rales.   Musculoskeletal:      Cervical back: Neck supple.      Right lower leg: No edema.      Left lower leg: No edema.   Skin:     General: Skin is warm and dry.   Neurological:      Mental Status: She is alert and oriented to person, place, and time.   Psychiatric:         Behavior: Behavior normal. "         Assessment/Plan   Diagnoses and all orders for this visit:    Hypoglycemia (Primary)  Comments:  advised to stop metformin and fup with her gyn    Other fatigue  Comments:  likely 2/2 hypoglycemia. discussed measures to prevent hypoglycemic episodes.    Missed period  Comments:  pt states she is not sexually active. possible due to PCOS. will check pregnancy test  Orders:  -     BhCG, Serum, Qual; Future    PCOS (polycystic ovarian syndrome)  Comments:  stop metformin 2/2 side effects. fup with gyn    I spent 30 minutes on this date of service performing the following activities: obtaining history, performing examination, entering orders, documenting, preparing for visit, obtaining / reviewing records and providing counseling and education.

## 2024-05-15 ENCOUNTER — TELEPHONE (OUTPATIENT)
Dept: FAMILY MEDICINE | Facility: CLINIC | Age: 24
End: 2024-05-15
Payer: COMMERCIAL

## 2024-05-15 NOTE — TELEPHONE ENCOUNTER
Patient called in with symptoms of chest pains, sore throat, fatigued & a slight fever. Call transferred to [place].

## 2024-05-15 NOTE — TELEPHONE ENCOUNTER
Spoke with pt. C/o sore throat x 1 week. Had chest tightness and SOB during her work out a few days ago. Has not been SOB since. Having fatigue and dizziness since yesterday. Low grade fever. Appt scheduled for eval

## 2024-05-28 ENCOUNTER — OFFICE VISIT (OUTPATIENT)
Dept: FAMILY MEDICINE | Facility: CLINIC | Age: 24
End: 2024-05-28
Payer: COMMERCIAL

## 2024-05-28 VITALS
HEART RATE: 69 BPM | WEIGHT: 151 LBS | HEIGHT: 64 IN | TEMPERATURE: 97.1 F | OXYGEN SATURATION: 99 % | DIASTOLIC BLOOD PRESSURE: 78 MMHG | BODY MASS INDEX: 25.78 KG/M2 | RESPIRATION RATE: 16 BRPM | SYSTOLIC BLOOD PRESSURE: 119 MMHG

## 2024-05-28 DIAGNOSIS — Z00.00 ENCOUNTER FOR GENERAL ADULT MEDICAL EXAMINATION WITHOUT ABNORMAL FINDINGS: Primary | ICD-10-CM

## 2024-05-28 DIAGNOSIS — Z11.59 NEED FOR HEPATITIS B SCREENING TEST: ICD-10-CM

## 2024-05-28 PROCEDURE — 99395 PREV VISIT EST AGE 18-39: CPT | Performed by: FAMILY MEDICINE

## 2024-05-28 PROCEDURE — 3008F BODY MASS INDEX DOCD: CPT | Performed by: FAMILY MEDICINE

## 2024-05-28 RX ORDER — BUPROPION HYDROCHLORIDE 150 MG/1
150 TABLET ORAL
COMMUNITY
Start: 2024-05-15 | End: 2024-07-28

## 2024-05-28 ASSESSMENT — ENCOUNTER SYMPTOMS
DIFFICULTY URINATING: 0
DIZZINESS: 0
HEADACHES: 0
FATIGUE: 0
VOMITING: 0
NAUSEA: 0
BACK PAIN: 0
APPETITE CHANGE: 0
SHORTNESS OF BREATH: 0
WHEEZING: 0
ARTHRALGIAS: 0
COLOR CHANGE: 0
FEVER: 0
RHINORRHEA: 0
SORE THROAT: 0
LIGHT-HEADEDNESS: 0
DIARRHEA: 0
ABDOMINAL PAIN: 0
CHEST TIGHTNESS: 0
SINUS PRESSURE: 0

## 2024-05-28 ASSESSMENT — PATIENT HEALTH QUESTIONNAIRE - PHQ9: SUM OF ALL RESPONSES TO PHQ9 QUESTIONS 1 & 2: 0

## 2024-05-28 NOTE — PROGRESS NOTES
[facility]  [address] [place] [address]  [phone]     Reason for visit:   Chief Complaint   Patient presents with    Annual Exam    Chest Pain      HPI    Kyaw Harrison is a 24 y.o. female who presents for a routine well visit.        Employed- pre-  Fun activities - staying active, cooking, hanging out with friends  Ever feel down/depressed - no   SI/HI - no  Diet - 3 well balanced meals a day including fruits, vegetables, carbohydrates, and protein.  Menses - regular  Sleep - sleeping well  Last dental visit - routine  Ophthalmology - n/a  Mammogram - No risk factors. Screening starts at 41yo.  Colonoscopy - No risk factors. Screening starts at 46yo.  Cervical Cancer Screening - 01/2023    Social History     Socioeconomic History    Marital status: Single     Spouse name: Not on file    Number of children: Not on file    Years of education: Not on file    Highest education level: Not on file   Occupational History    Not on file   Tobacco Use    Smoking status: Never    Smokeless tobacco: Never   Vaping Use    Vaping Use: Never used   Substance and Sexual Activity    Alcohol use: Yes     Comment: Light    Drug use: Never    Sexual activity: Never   Other Topics Concern    Not on file   Social History Narrative    Not on file     Social Determinants of Health     Financial Resource Strain: Not on file   Food Insecurity: No Food Insecurity (2/8/2024)    Hunger Vital Sign     Worried About Running Out of Food in the Last Year: Never true     Ran Out of Food in the Last Year: Never true   Transportation Needs: Not on file   Physical Activity: Not on file   Stress: Not on file   Social Connections: Not on file   Intimate Partner Violence: Not on file   Housing Stability: Not on file        Medical History:  Past Medical History:   Diagnosis Date    Anxiety     Concussion     Margo-Danlos syndrome     Hypoglycemia     POP (persistent occipitoposterior position)      POTS (postural orthostatic tachycardia syndrome)     PTSD (post-traumatic stress disorder)        Surgical History:  Past Surgical History:   Procedure Laterality Date    APPENDECTOMY      SKIN BIOPSY           Family History:  Family History   Problem Relation Age of Onset    Diabetes Biological Mother     Bipolar disorder Biological Father     JOSE RAUL disease Biological Father     No Known Problems Biological Sister         gastroparesis unknown     Kidney disease Maternal Grandmother     Hyperlipidemia Maternal Grandmother     Diabetes Maternal Grandfather     Heart disease Maternal Grandfather     Lupus Paternal Grandmother     Thyroid disease Paternal Grandmother     Hydrocephalus Paternal Grandmother     Bipolar disorder Paternal Grandmother     Atrial fibrillation Paternal Grandfather        Allergies:  Iodinated contrast media, Aripiprazole, Iodine, Metoclopramide hcl, and Sertraline    Current Medications:  Current Outpatient Medications   Medication Sig Dispense Refill    AIMOVIG AUTOINJECTOR 140 mg/mL auto-injector       AIMOVIG AUTOINJECTOR 70 mg/mL auto-injector subcutaneous injection       buPROPion XL (WELLBUTRIN XL) 150 mg 24 hr tablet Take 150 mg by mouth.      clonazePAM (klonoPIN) 0.5 mg tablet Take 0.5 mg by mouth daily as needed.      fludrocortisone (FLORINEF) 0.1 mg tablet Take 0.1 mg by mouth daily.      meclizine (ANTIVERT) 25 mg tablet TAKE 1 TABLET BY MOUTH 3-4 TIMES DAILY AS NEEDED FOR DIZZINESS      metFORMIN (GLUCOPHAGE) 500 mg tablet       midodrine (PROAMATINE) 5 mg tablet Take 5 mg by mouth.      naproxen (NAPROSYN) 500 mg tablet Take 1 tablet (500 mg total) by mouth 2 (two) times a day with meals. 30 tablet 0    naproxen sodium (ANAPROX) 550 mg tablet TAKE 1 TABLET (550 MG) BY MOUTH EVERY 12 HOURS WITH FOOD OR MILK AS NEEDED      nortriptyline (PAMELOR) 50 mg capsule       ondansetron ODT (ZOFRAN-ODT) 4 mg disintegrating tablet Take 1 tablet (4 mg total) by mouth every 8 (eight) hours  as needed for nausea or vomiting for up to 5 days. 10 tablet 0    propranoloL (INDERAL) 10 mg tablet 1 TAB(S) ORALLY TWICE A DAY AS NEEDED 90 DAYS       No current facility-administered medications for this visit.       Review of Systems:  Review of Systems   Constitutional:  Negative for appetite change, fatigue and fever.   HENT:  Negative for hearing loss, rhinorrhea, sinus pressure and sore throat.    Eyes:  Negative for visual disturbance.   Respiratory:  Negative for chest tightness, shortness of breath and wheezing.    Cardiovascular:  Negative for chest pain.   Gastrointestinal:  Negative for abdominal pain, diarrhea, nausea and vomiting.   Genitourinary:  Negative for difficulty urinating.   Musculoskeletal:  Negative for arthralgias and back pain.   Skin:  Negative for color change.   Neurological:  Negative for dizziness, light-headedness and headaches.   Psychiatric/Behavioral:  Negative for behavioral problems.        Objective    Vital Signs:  Vitals:    05/28/24 1335   BP: 119/78   Pulse: 69   Resp: 16   Temp: 36.2 °C (97.1 °F)   SpO2: 99%       BMI:  Body mass index is 25.92 kg/m².     Physical Exam  Vitals and nursing note reviewed.   Constitutional:       Appearance: She is well-developed.   HENT:      Head: Normocephalic and atraumatic.      Right Ear: Tympanic membrane, ear canal and external ear normal.      Left Ear: Tympanic membrane, ear canal and external ear normal.      Mouth/Throat:      Mouth: Mucous membranes are moist.      Pharynx: Oropharynx is clear. No oropharyngeal exudate or posterior oropharyngeal erythema.   Eyes:      Pupils: Pupils are equal, round, and reactive to light.   Cardiovascular:      Rate and Rhythm: Normal rate and regular rhythm.      Heart sounds: Normal heart sounds. No murmur heard.     No friction rub.   Pulmonary:      Effort: Pulmonary effort is normal. No respiratory distress.      Breath sounds: Normal breath sounds. No wheezing or rales.   Abdominal:       General: Bowel sounds are normal.      Palpations: Abdomen is soft.      Tenderness: There is no abdominal tenderness. There is no guarding.   Musculoskeletal:         General: Normal range of motion.      Cervical back: Normal range of motion and neck supple. No tenderness.      Right lower leg: No edema.      Left lower leg: No edema.   Lymphadenopathy:      Cervical: No cervical adenopathy.   Skin:     General: Skin is warm and dry.   Neurological:      Mental Status: She is alert and oriented to person, place, and time.   Psychiatric:         Behavior: Behavior normal.         Recent labs before today:     Lab Results   Component Value Date    WBC 6.85 02/08/2024    HGB 13.0 02/08/2024    HCT 38.5 02/08/2024     02/08/2024    CHOL 192 06/09/2023    TRIG 132 06/09/2023    HDL 64 06/09/2023    ALT 10 06/09/2023    AST 15 06/09/2023     02/08/2024    K 3.5 02/08/2024     02/08/2024    CREATININE 0.7 02/08/2024    BUN 11 02/08/2024    CO2 22 02/08/2024    TSH 0.54 02/08/2024    HGBA1C 4.8 12/05/2022        Procedures   Assessment     There are no Patient Instructions on file for this visit.    Problem List Items Addressed This Visit    None  Visit Diagnoses       Encounter for general adult medical examination without abnormal findings    -  Primary    Relevant Orders    TSH w reflex FT4    Comprehensive metabolic panel    CBC and Differential    Lipid panel    Hepatitis B Surface Antibody Immunity, Quantitative    Need for hepatitis B screening test        Relevant Orders    Hepatitis B Surface Antibody Immunity, Quantitative                Today we discussed a healthy diet with fruits, vegetables, and low-fat items with a focus on complex carbohydrates. Regular physical activity is encouraged with a goal of 30 minutes of cardio most days of the week with some muscle strengthening.    Try to limit caffeine to less than 24 ounces per day, replenish with plenty of water. Hydration has a bigger  impact on your health than you think!    Reviewed safe alcohol habits. If you drink while out of the house, plan for a designated . Never drink and drive. Avoid THC use, smoking, or vaping.    Reviewed safe sexual practices, if sexually active always use a condom to discourage STD transmission.     Routine assessments by specialists such as OBGYN, cardiology, GI, etc. were encouraged if applicable.     Watch for changes in bowel habits, especially black or bloody stools.     Patient is UTD with immunizations.  Encouraged yearly flu shot in Oct/Nov.     Fasting labs, follow up One year for annual check up.          Phu Vega, DO  5/28/2024

## 2024-05-31 LAB
ALBUMIN SERPL-MCNC: 4.4 G/DL (ref 4–5)
ALBUMIN/GLOB SERPL: 1.4 {RATIO} (ref 1.2–2.2)
ALP SERPL-CCNC: 77 IU/L (ref 44–121)
ALT SERPL-CCNC: 28 IU/L (ref 0–32)
AST SERPL-CCNC: 19 IU/L (ref 0–40)
BASOPHILS # BLD AUTO: 0 X10E3/UL (ref 0–0.2)
BASOPHILS NFR BLD AUTO: 0 %
BILIRUB SERPL-MCNC: 0.3 MG/DL (ref 0–1.2)
BUN SERPL-MCNC: 14 MG/DL (ref 6–20)
BUN/CREAT SERPL: 18 (ref 9–23)
CALCIUM SERPL-MCNC: 9.7 MG/DL (ref 8.7–10.2)
CHLORIDE SERPL-SCNC: 100 MMOL/L (ref 96–106)
CHOLEST SERPL-MCNC: 155 MG/DL (ref 100–199)
CO2 SERPL-SCNC: 25 MMOL/L (ref 20–29)
CREAT SERPL-MCNC: 0.79 MG/DL (ref 0.57–1)
EGFRCR SERPLBLD CKD-EPI 2021: 108 ML/MIN/1.73
EOSINOPHIL # BLD AUTO: 0.1 X10E3/UL (ref 0–0.4)
EOSINOPHIL NFR BLD AUTO: 1 %
ERYTHROCYTE [DISTWIDTH] IN BLOOD BY AUTOMATED COUNT: 12.1 % (ref 11.7–15.4)
GLOBULIN SER CALC-MCNC: 3.2 G/DL (ref 1.5–4.5)
GLUCOSE SERPL-MCNC: 81 MG/DL (ref 70–99)
HBV SURFACE AB SER-ACNC: <3.1 MIU/ML
HCT VFR BLD AUTO: 41 % (ref 34–46.6)
HDLC SERPL-MCNC: 48 MG/DL
HGB BLD-MCNC: 14 G/DL (ref 11.1–15.9)
IMM GRANULOCYTES # BLD AUTO: 0 X10E3/UL (ref 0–0.1)
IMM GRANULOCYTES NFR BLD AUTO: 0 %
LDLC SERPL CALC-MCNC: 83 MG/DL (ref 0–99)
LYMPHOCYTES # BLD AUTO: 2.1 X10E3/UL (ref 0.7–3.1)
LYMPHOCYTES NFR BLD AUTO: 38 %
MCH RBC QN AUTO: 31.3 PG (ref 26.6–33)
MCHC RBC AUTO-ENTMCNC: 34.1 G/DL (ref 31.5–35.7)
MCV RBC AUTO: 92 FL (ref 79–97)
MONOCYTES # BLD AUTO: 0.3 X10E3/UL (ref 0.1–0.9)
MONOCYTES NFR BLD AUTO: 6 %
NEUTROPHILS # BLD AUTO: 3 X10E3/UL (ref 1.4–7)
NEUTROPHILS NFR BLD AUTO: 55 %
PLATELET # BLD AUTO: 274 X10E3/UL (ref 150–450)
POTASSIUM SERPL-SCNC: 4.3 MMOL/L (ref 3.5–5.2)
PROT SERPL-MCNC: 7.6 G/DL (ref 6–8.5)
RBC # BLD AUTO: 4.48 X10E6/UL (ref 3.77–5.28)
SODIUM SERPL-SCNC: 138 MMOL/L (ref 134–144)
T4 FREE SERPL-MCNC: 1.23 NG/DL (ref 0.82–1.77)
TRIGL SERPL-MCNC: 139 MG/DL (ref 0–149)
TSH SERPL DL<=0.005 MIU/L-ACNC: 0.77 UIU/ML (ref 0.45–4.5)
VLDLC SERPL CALC-MCNC: 24 MG/DL (ref 5–40)
WBC # BLD AUTO: 5.4 X10E3/UL (ref 3.4–10.8)

## 2024-07-06 DIAGNOSIS — M79.10 MYALGIA: ICD-10-CM

## 2024-07-08 RX ORDER — NAPROXEN 500 MG/1
500 TABLET ORAL 2 TIMES DAILY WITH MEALS
Qty: 30 TABLET | Refills: 0 | Status: SHIPPED | OUTPATIENT
Start: 2024-07-08

## 2024-07-12 ENCOUNTER — TELEPHONE (OUTPATIENT)
Dept: FAMILY MEDICINE | Facility: CLINIC | Age: 24
End: 2024-07-12
Payer: COMMERCIAL

## 2024-07-23 ENCOUNTER — TELEPHONE (OUTPATIENT)
Dept: FAMILY MEDICINE | Facility: CLINIC | Age: 24
End: 2024-07-23
Payer: COMMERCIAL

## 2024-07-23 NOTE — TELEPHONE ENCOUNTER
[facility] Appointment Request   Provider: Silvia  Appointment Type: Diagnostic Appointment   Reason for Visit: TB Test   Available Day and Time: ASAP  Best Contact Number: [phone]    The practice will reach out to schedule your appointment within the next 2 business days.

## 2024-07-24 ENCOUNTER — TELEPHONE (OUTPATIENT)
Dept: FAMILY MEDICINE | Facility: CLINIC | Age: 24
End: 2024-07-24

## 2024-07-24 ENCOUNTER — OFFICE VISIT (OUTPATIENT)
Dept: FAMILY MEDICINE | Facility: CLINIC | Age: 24
End: 2024-07-24
Payer: COMMERCIAL

## 2024-07-24 VITALS
WEIGHT: 150 LBS | OXYGEN SATURATION: 98 % | SYSTOLIC BLOOD PRESSURE: 100 MMHG | RESPIRATION RATE: 16 BRPM | HEART RATE: 75 BPM | BODY MASS INDEX: 25.61 KG/M2 | DIASTOLIC BLOOD PRESSURE: 62 MMHG | TEMPERATURE: 97.7 F | HEIGHT: 64 IN

## 2024-07-24 DIAGNOSIS — E16.2 HYPOGLYCEMIA: Primary | ICD-10-CM

## 2024-07-24 DIAGNOSIS — R21 RASH: ICD-10-CM

## 2024-07-24 DIAGNOSIS — K13.0 ANGULAR CHEILITIS: ICD-10-CM

## 2024-07-24 PROCEDURE — 3008F BODY MASS INDEX DOCD: CPT | Performed by: NURSE PRACTITIONER

## 2024-07-24 PROCEDURE — 99214 OFFICE O/P EST MOD 30 MIN: CPT | Performed by: NURSE PRACTITIONER

## 2024-07-24 RX ORDER — CEPHALEXIN 500 MG/1
500 CAPSULE ORAL 4 TIMES DAILY
Qty: 28 CAPSULE | Refills: 0 | Status: SHIPPED | OUTPATIENT
Start: 2024-07-24 | End: 2024-07-31

## 2024-07-24 RX ORDER — SULFAMETHOXAZOLE AND TRIMETHOPRIM 800; 160 MG/1; MG/1
TABLET ORAL
COMMUNITY
Start: 2024-07-22 | End: 2024-07-24

## 2024-07-24 RX ORDER — LEVOFLOXACIN 500 MG/1
TABLET, FILM COATED ORAL
COMMUNITY
Start: 2024-07-24 | End: 2024-07-24

## 2024-07-24 RX ORDER — KETOCONAZOLE 20 MG/G
CREAM TOPICAL DAILY
Qty: 15 G | Refills: 1 | Status: SHIPPED | OUTPATIENT
Start: 2024-07-24 | End: 2024-08-23

## 2024-07-24 ASSESSMENT — ENCOUNTER SYMPTOMS
COUGH: 0
FATIGUE: 1
VOMITING: 0
SHORTNESS OF BREATH: 0
ABDOMINAL PAIN: 0
WHEEZING: 0
FEVER: 0
DIARRHEA: 1
NAUSEA: 1
CHILLS: 0
ABDOMINAL DISTENTION: 1
CONSTIPATION: 0
HEADACHES: 0
LIGHT-HEADEDNESS: 0

## 2024-07-24 NOTE — TELEPHONE ENCOUNTER
Spoke with pt. She has not been feeling well recently with c/o nausea, intermittent abd distention and feeling warm. Also has rash on eyelid and around mouth. She was seen at  and they felt rash was bacterial and she was started on Bactrim. Has since completed antibiotic but rash is not improving. Appt scheduled for eval

## 2024-07-24 NOTE — TELEPHONE ENCOUNTER
Pt requesting a phone call regarding staff infection Provello which is spreading. Pt infection spreading throughout body. Please assist.

## 2024-07-24 NOTE — PATIENT INSTRUCTIONS
Multiple issues, concern with possible hypoglycemia, refer to endocrine (Dr. BOBBY), Esa for possible staph, continue Bentyl, Low FODMAP diet information given. Ketoconazole for angular chelitis. Follow up as needed.

## 2024-07-24 NOTE — PROGRESS NOTES
[facility]  [address] [place] [address]  [phone]     Reason for visit:   Chief Complaint   Patient presents with    Rash     Had rash on side of mouth which then moved into back of throat  Went to UC and they started on ABX and also saw bladder infection  Seen new rash on chest, had diarrhea, nausea  Mom is concerned if it was MRSA as pustules have not responded to bactrim   Has recurring staph infection      HPI   Kyaw Harrison is a 24 y.o. female who presents with recurrent staph infection, hypoglycemia, LLQ abd pain, diarrhea, bloating, Hx of POST and Margo Danlos        Medical History:  Past Medical History:   Diagnosis Date    Anxiety     Concussion     Margo-Danlos syndrome     Hypoglycemia     POP (persistent occipitoposterior position)     POTS (postural orthostatic tachycardia syndrome)     PTSD (post-traumatic stress disorder)        Surgical History:  Past Surgical History:   Procedure Laterality Date    APPENDECTOMY      SKIN BIOPSY         Social History:  Social History     Social History Narrative    Not on file       Family History:  Family History   Problem Relation Age of Onset    Diabetes Biological Mother     Bipolar disorder Biological Father     JOSE RAUL disease Biological Father     No Known Problems Biological Sister         gastroparesis unknown     Kidney disease Maternal Grandmother     Hyperlipidemia Maternal Grandmother     Diabetes Maternal Grandfather     Heart disease Maternal Grandfather     Lupus Paternal Grandmother     Thyroid disease Paternal Grandmother     Hydrocephalus Paternal Grandmother     Bipolar disorder Paternal Grandmother     Atrial fibrillation Paternal Grandfather        Allergies:  Iodinated contrast media, Aripiprazole, Iodine, Metoclopramide hcl, and Sertraline    Current Medications:  Current Outpatient Medications   Medication Sig Dispense Refill    AIMOVIG AUTOINJECTOR 140 mg/mL auto-injector       AIMOVIG AUTOINJECTOR 70  mg/mL auto-injector subcutaneous injection       buPROPion XL (WELLBUTRIN XL) 150 mg 24 hr tablet Take 150 mg by mouth.      cephalexin (KEFLEX) 500 mg capsule Take 1 capsule (500 mg total) by mouth 4 (four) times a day for 7 days. 28 capsule 0    clonazePAM (klonoPIN) 0.5 mg tablet Take 0.5 mg by mouth daily as needed.      fludrocortisone (FLORINEF) 0.1 mg tablet Take 0.1 mg by mouth daily.      ketoconazole (NIZORAL) 2 % cream Apply topically daily. Apply a small amount to corner of the mouth 15 g 1    meclizine (ANTIVERT) 25 mg tablet TAKE 1 TABLET BY MOUTH 3-4 TIMES DAILY AS NEEDED FOR DIZZINESS      metFORMIN (GLUCOPHAGE) 500 mg tablet       midodrine (PROAMATINE) 5 mg tablet Take 5 mg by mouth.      naproxen (NAPROSYN) 500 mg tablet TAKE 1 TABLET BY MOUTH TWICE A DAY WITH MEALS 30 tablet 0    naproxen sodium (ANAPROX) 550 mg tablet TAKE 1 TABLET (550 MG) BY MOUTH EVERY 12 HOURS WITH FOOD OR MILK AS NEEDED      nortriptyline (PAMELOR) 50 mg capsule       ondansetron ODT (ZOFRAN-ODT) 4 mg disintegrating tablet Take 1 tablet (4 mg total) by mouth every 8 (eight) hours as needed for nausea or vomiting for up to 5 days. 10 tablet 0    propranoloL (INDERAL) 10 mg tablet 1 TAB(S) ORALLY TWICE A DAY AS NEEDED 90 DAYS       No current facility-administered medications for this visit.       Review of Systems:  Review of Systems   Constitutional:  Positive for fatigue. Negative for chills and fever.   Respiratory:  Negative for cough, shortness of breath and wheezing.    Cardiovascular:  Negative for chest pain.   Gastrointestinal:  Positive for abdominal distention (bloating with certain foods), diarrhea and nausea. Negative for abdominal pain, constipation and vomiting.   Neurological:  Negative for light-headedness and headaches.       Objective     Vital Signs:  Vitals:    07/24/24 1359   BP: 100/62   Pulse: 75   Resp: 16   Temp: 36.5 °C (97.7 °F)   SpO2: 98%       BMI:  Body mass index is 25.75 kg/m².     Physical  Exam  Constitutional:       Appearance: Normal appearance.   HENT:      Head: Normocephalic and atraumatic.   Cardiovascular:      Rate and Rhythm: Normal rate and regular rhythm.      Heart sounds: Normal heart sounds.   Pulmonary:      Effort: Pulmonary effort is normal.      Breath sounds: Normal breath sounds.   Neurological:      General: No focal deficit present.      Mental Status: She is alert and oriented to person, place, and time.   Psychiatric:         Attention and Perception: Attention and perception normal.         Mood and Affect: Mood and affect normal.         Speech: Speech normal.         Behavior: Behavior normal. Behavior is cooperative.         Thought Content: Thought content normal.         Cognition and Memory: Cognition and memory normal.         Judgment: Judgment normal.         Recent labs before today:     Lab Results   Component Value Date    WBC 5.4 05/30/2024    HGB 14.0 05/30/2024    HCT 41.0 05/30/2024     05/30/2024    CHOL 155 05/30/2024    TRIG 139 05/30/2024    HDL 48 05/30/2024    ALT 28 05/30/2024    AST 19 05/30/2024     05/30/2024    K 4.3 05/30/2024     05/30/2024    CREATININE 0.79 05/30/2024    BUN 14 05/30/2024    CO2 25 05/30/2024    TSH 0.766 05/30/2024    HGBA1C 4.8 12/05/2022        Procedures   Assessment     Patient Instructions   Multiple issues, concern with possible hypoglycemia, refer to endocrine (Dr. BOBBY), Keflex for possible staph, continue Bentyl, Low FODMAP diet information given. Ketoconazole for angular chelitis. Follow up as needed.   [unfilled]  Problem List Items Addressed This Visit          Endocrine/Metabolic    Hypoglycemia - Primary    Relevant Orders    Ambulatory referral to Endocrinology       Dermatologic    Rash    Relevant Medications    ketoconazole (NIZORAL) 2 % cream     Other Visit Diagnoses       Angular cheilitis                   The total time spent ON THE DAY OF THE VISIT was 30 minutes, including preparing to  see the patient, obtaining and reviewing separately obtained history, performing medically appropriate examination or evaluation, counseling and educating patient/family/caregiver, ordering medications, tests or procedures, referring and communicating with other health care professionals, documenting clinical information in electronic or other record, independently interpreting and communicating results to patient/family/caregiver, and/or care coordination.             Davy Weaver DNP, GLORIA  7/24/2024      This document was created using Dragon dictation software.  There might be some typographical errors due to this technology.

## 2024-07-25 DIAGNOSIS — Z11.1 SCREENING FOR TUBERCULOSIS: Primary | ICD-10-CM

## 2024-07-28 ENCOUNTER — HOSPITAL ENCOUNTER (EMERGENCY)
Facility: HOSPITAL | Age: 24
Discharge: HOME | End: 2024-07-29
Attending: EMERGENCY MEDICINE
Payer: COMMERCIAL

## 2024-07-28 ENCOUNTER — APPOINTMENT (EMERGENCY)
Dept: RADIOLOGY | Facility: HOSPITAL | Age: 24
End: 2024-07-28
Payer: COMMERCIAL

## 2024-07-28 DIAGNOSIS — R53.1 WEAKNESS: Primary | ICD-10-CM

## 2024-07-28 DIAGNOSIS — R55 SYNCOPE, UNSPECIFIED SYNCOPE TYPE: ICD-10-CM

## 2024-07-28 LAB
ALBUMIN SERPL-MCNC: 4.2 G/DL (ref 3.5–5.7)
ALP SERPL-CCNC: 48 IU/L (ref 34–125)
ALT SERPL-CCNC: 11 IU/L (ref 7–52)
AMORPH CRY #/AREA URNS HPF: 1 /HPF
ANION GAP SERPL CALC-SCNC: 6 MEQ/L (ref 3–15)
AST SERPL-CCNC: 16 IU/L (ref 13–39)
BACTERIA URNS QL MICRO: ABNORMAL /HPF
BASOPHILS # BLD: 0.03 K/UL (ref 0.01–0.1)
BASOPHILS NFR BLD: 0.6 %
BILIRUB SERPL-MCNC: 0.3 MG/DL (ref 0.3–1.2)
BILIRUB UR QL STRIP.AUTO: NEGATIVE MG/DL
BUN SERPL-MCNC: 10 MG/DL (ref 7–25)
CALCIUM SERPL-MCNC: 9.1 MG/DL (ref 8.6–10.3)
CHLORIDE SERPL-SCNC: 107 MEQ/L (ref 98–107)
CLARITY UR REFRACT.AUTO: ABNORMAL
CO2 SERPL-SCNC: 25 MEQ/L (ref 21–31)
COLOR UR AUTO: YELLOW
CREAT SERPL-MCNC: 0.7 MG/DL (ref 0.6–1.2)
DIFFERENTIAL METHOD BLD: NORMAL
EGFRCR SERPLBLD CKD-EPI 2021: >60 ML/MIN/1.73M*2
EOSINOPHIL # BLD: 0.07 K/UL (ref 0.04–0.36)
EOSINOPHIL NFR BLD: 1.4 %
ERYTHROCYTE [DISTWIDTH] IN BLOOD BY AUTOMATED COUNT: 12.1 % (ref 11.7–14.4)
FLUAV RNA SPEC QL NAA+PROBE: NEGATIVE
FLUBV RNA SPEC QL NAA+PROBE: NEGATIVE
GLUCOSE SERPL-MCNC: 89 MG/DL (ref 70–99)
GLUCOSE UR STRIP.AUTO-MCNC: NEGATIVE MG/DL
HCG UR QL: NEGATIVE
HCT VFR BLD AUTO: 42.3 % (ref 35–45)
HGB BLD-MCNC: 14.4 G/DL (ref 11.8–15.7)
HGB UR QL STRIP.AUTO: NEGATIVE
HYALINE CASTS #/AREA URNS LPF: ABNORMAL /LPF
IMM GRANULOCYTES # BLD AUTO: 0.02 K/UL (ref 0–0.08)
IMM GRANULOCYTES NFR BLD AUTO: 0.4 %
KETONES UR STRIP.AUTO-MCNC: NEGATIVE MG/DL
LEUKOCYTE ESTERASE UR QL STRIP.AUTO: NEGATIVE
LYMPHOCYTES # BLD: 2 K/UL (ref 1.2–3.5)
LYMPHOCYTES NFR BLD: 39.1 %
MCH RBC QN AUTO: 30.6 PG (ref 28–33.2)
MCHC RBC AUTO-ENTMCNC: 34 G/DL (ref 32.2–35.5)
MCV RBC AUTO: 90 FL (ref 83–98)
MONOCYTES # BLD: 0.35 K/UL (ref 0.28–0.8)
MONOCYTES NFR BLD: 6.8 %
MUCOUS THREADS URNS QL MICRO: ABNORMAL /LPF
NEUTROPHILS # BLD: 2.64 K/UL (ref 1.7–7)
NEUTS SEG NFR BLD: 51.7 %
NITRITE UR QL STRIP.AUTO: NEGATIVE
NRBC BLD-RTO: 0 %
PDW BLD AUTO: 9.6 FL (ref 9.4–12.3)
PH UR STRIP.AUTO: 7.5 [PH]
PLATELET # BLD AUTO: 264 K/UL (ref 150–369)
POTASSIUM SERPL-SCNC: 4.1 MEQ/L (ref 3.5–5.1)
PROT SERPL-MCNC: 7.2 G/DL (ref 6–8.2)
PROT UR QL STRIP.AUTO: NEGATIVE
RBC # BLD AUTO: 4.7 M/UL (ref 3.93–5.22)
RBC #/AREA URNS HPF: ABNORMAL /HPF
RSV RNA SPEC QL NAA+PROBE: NEGATIVE
SARS-COV-2 RNA RESP QL NAA+PROBE: NEGATIVE
SODIUM SERPL-SCNC: 138 MEQ/L (ref 136–145)
SP GR UR REFRACT.AUTO: 1.02
SQUAMOUS URNS QL MICRO: ABNORMAL /HPF
TROPONIN I SERPL HS-MCNC: <2 PG/ML
UROBILINOGEN UR STRIP-ACNC: 0.2 EU/DL
WBC # BLD AUTO: 5.11 K/UL (ref 3.8–10.5)
WBC #/AREA URNS HPF: ABNORMAL /HPF

## 2024-07-28 PROCEDURE — 80053 COMPREHEN METABOLIC PANEL: CPT | Performed by: STUDENT IN AN ORGANIZED HEALTH CARE EDUCATION/TRAINING PROGRAM

## 2024-07-28 PROCEDURE — 96361 HYDRATE IV INFUSION ADD-ON: CPT

## 2024-07-28 PROCEDURE — 87637 SARSCOV2&INF A&B&RSV AMP PRB: CPT | Performed by: STUDENT IN AN ORGANIZED HEALTH CARE EDUCATION/TRAINING PROGRAM

## 2024-07-28 PROCEDURE — 63600000 HC DRUGS/DETAIL CODE: Mod: JZ | Performed by: STUDENT IN AN ORGANIZED HEALTH CARE EDUCATION/TRAINING PROGRAM

## 2024-07-28 PROCEDURE — 84484 ASSAY OF TROPONIN QUANT: CPT

## 2024-07-28 PROCEDURE — 63600000 HC DRUGS/DETAIL CODE: Mod: JZ

## 2024-07-28 PROCEDURE — 85025 COMPLETE CBC W/AUTO DIFF WBC: CPT

## 2024-07-28 PROCEDURE — 3E0337Z INTRODUCTION OF ELECTROLYTIC AND WATER BALANCE SUBSTANCE INTO PERIPHERAL VEIN, PERCUTANEOUS APPROACH: ICD-10-PCS | Performed by: EMERGENCY MEDICINE

## 2024-07-28 PROCEDURE — 71046 X-RAY EXAM CHEST 2 VIEWS: CPT

## 2024-07-28 PROCEDURE — 93005 ELECTROCARDIOGRAM TRACING: CPT

## 2024-07-28 PROCEDURE — 99284 EMERGENCY DEPT VISIT MOD MDM: CPT | Mod: U5,25

## 2024-07-28 PROCEDURE — 63700000 HC SELF-ADMINISTRABLE DRUG: Performed by: STUDENT IN AN ORGANIZED HEALTH CARE EDUCATION/TRAINING PROGRAM

## 2024-07-28 PROCEDURE — 36415 COLL VENOUS BLD VENIPUNCTURE: CPT | Performed by: STUDENT IN AN ORGANIZED HEALTH CARE EDUCATION/TRAINING PROGRAM

## 2024-07-28 PROCEDURE — 84703 CHORIONIC GONADOTROPIN ASSAY: CPT | Performed by: STUDENT IN AN ORGANIZED HEALTH CARE EDUCATION/TRAINING PROGRAM

## 2024-07-28 PROCEDURE — 96360 HYDRATION IV INFUSION INIT: CPT

## 2024-07-28 PROCEDURE — 81003 URINALYSIS AUTO W/O SCOPE: CPT | Performed by: STUDENT IN AN ORGANIZED HEALTH CARE EDUCATION/TRAINING PROGRAM

## 2024-07-28 RX ORDER — CEPHALEXIN 500 MG/1
500 CAPSULE ORAL ONCE
Status: COMPLETED | OUTPATIENT
Start: 2024-07-28 | End: 2024-07-28

## 2024-07-28 RX ADMIN — CEPHALEXIN 500 MG: 500 CAPSULE ORAL at 22:32

## 2024-07-28 RX ADMIN — SODIUM CHLORIDE, POTASSIUM CHLORIDE, SODIUM LACTATE AND CALCIUM CHLORIDE 500 ML: 600; 310; 30; 20 INJECTION, SOLUTION INTRAVENOUS at 21:42

## 2024-07-28 RX ADMIN — SODIUM CHLORIDE, POTASSIUM CHLORIDE, SODIUM LACTATE AND CALCIUM CHLORIDE 500 ML: 600; 310; 30; 20 INJECTION, SOLUTION INTRAVENOUS at 22:33

## 2024-07-29 VITALS
WEIGHT: 157.41 LBS | HEIGHT: 60 IN | DIASTOLIC BLOOD PRESSURE: 77 MMHG | OXYGEN SATURATION: 99 % | TEMPERATURE: 97.9 F | RESPIRATION RATE: 17 BRPM | SYSTOLIC BLOOD PRESSURE: 117 MMHG | BODY MASS INDEX: 30.9 KG/M2 | HEART RATE: 90 BPM

## 2024-07-29 LAB
ATRIAL RATE: 90
P AXIS: 74
PR INTERVAL: 152
QRS DURATION: 64
QT INTERVAL: 354
QTC CALCULATION(BAZETT): 433
R AXIS: 82
T WAVE AXIS: 60
VENTRICULAR RATE: 90

## 2024-07-29 NOTE — DISCHARGE INSTRUCTIONS
Please follow-up with your primary care physician within a week.  Return for any new or worsening symptoms of concern.

## 2024-07-29 NOTE — ED ATTESTATION NOTE
I have personally seen and examined Kyaw Harrison.  I was involved in the care and medical decision making for this patient.     I reviewed and agree with resident / physician assistant / nurse practitioner’s assessment and plan of care, with any exceptions as documented below.      My focused history, examination, assessment, and plan of care of is as follows:    Kyaw Harrison is a 24 y.o. female with POTS syndrome and Margo-Danlos syndrome who is presenting for evaluation.  Patient is, accompanied by mother presents from urgent care.  She had a recent UTI and concern for recent staph infection.  She was changed from Bactrim to Keflex after 2-day course of Bactrim by PCP last week.  She had a recent CT of the abdomen pelvis and ultrasound of her pelvis that was unremarkable.  She notes that she has had continued weakness and fatigue.  At urgent care she had 2 episodes of syncope with standing today.  This is consistent with prior episodes of syncope with POTS.  No chest pain or shortness of breath at this point in time.    ED Triage Vitals   Temp Heart Rate Resp BP SpO2   07/28/24 2055 07/28/24 2053 07/28/24 2053 07/28/24 2053 07/28/24 2055   36.6 °C (97.9 °F) 85 17 120/85 99 %      Temp Source Heart Rate Source Patient Position BP Location FiO2 (%) (Set)   07/28/24 2055 07/28/24 2053 07/28/24 2053 07/28/24 2053 --   Oral Monitor Sitting Left upper arm        Vital Signs Review: Vital signs have been reviewed. The oxygen saturation is SpO2: 99 % which is adequate.     Physical Exam: Soft, nontender, nonperitoneal abdomen.  Lungs clear to auscultation bilaterally.  Heart with no murmur, regular rhythm.  No rash.    Assessment/Plan/MDM: 24-year-old female presenting for evaluation.  Patient is hemodynamically stable and afebrile.  1 L of lactated Ringer's ordered.  Patient requesting her home dose of Keflex which was ordered.  Workup is reassuring.  Patient discharged with plans to follow-up with PCP.       Charanjit  DO [place]  07/29/24 0103

## 2024-07-29 NOTE — ED TRIAGE NOTES
Patient arrives today from [facility] after a syncopal episode. She reports that the past week she has been having low grade fevers, weakness, diarrhea, generalized abdominal pain, and feelings of weakness. All symptoms started from a UTI- placed on Keflex and Bactrim to help however has developed what doctors believe to be a staph infection. She reports a rash over chest and face that has not improved along with the above listed symptoms.  Today she went to [facility] to be evaluated due to increased in above symptoms however when she stood up she had a syncopal episode. HX of POTS; has been taking medications as prescribed.

## 2024-07-29 NOTE — ED PROVIDER NOTES
Emergency Medicine Note  HPI   HISTORY OF PRESENT ILLNESS     24-year-old female with history of Margo-Danlos syndrome, PCOS, POTS, anxiety, PTSD presenting with 2 episodes of syncope that occurred PTA. Mom witnessed second episode while in UC office. Patient also reports left lower quadrant pain for last few weeks. As per EMR, CT abdomen pelvis performed on 7/12/2024 was unremarkable, as was transvaginal and transabdominal pelvic ultrasound performed for concern of ovarian torsion.    Recently diagnosed with UTI for which she has been taking Keflex and Bactrim.       As per EMR,  Triage note: Patient arrives today from [facility] after a syncopal episode. She reports that the past week she has been having low grade fevers, weakness, diarrhea, generalized abdominal pain, and feelings of weakness. All symptoms started from a UTI- placed on Keflex and Bactrim to help however has developed what doctors believe to be a staph infection. She reports a rash over chest and face that has not improved along with the above listed symptoms.  Today she went to [facility] to be evaluated due to increased in above symptoms however when she stood up she had a syncopal episode. HX of POTS; has been taking medications as prescribed.         Patient History   PAST HISTORY     Reviewed from Nursing Triage:  Tobacco  Allergies  Meds  Problems  Med Hx  Surg Hx  Fam Hx  Soc   Hx      Past Medical History:   Diagnosis Date    Anxiety     Concussion     Margo-Danlos syndrome     Hypoglycemia     PCOS (polycystic ovarian syndrome)     POP (persistent occipitoposterior position)     POTS (postural orthostatic tachycardia syndrome)     PTSD (post-traumatic stress disorder)        Past Surgical History:   Procedure Laterality Date    APPENDECTOMY      SKIN BIOPSY         Family History   Problem Relation Age of Onset    Diabetes Biological Mother     Bipolar disorder Biological Father     JOSE RAUL disease Biological Father     No  Known Problems Biological Sister         gastroparesis unknown     Kidney disease Maternal Grandmother     Hyperlipidemia Maternal Grandmother     Diabetes Maternal Grandfather     Heart disease Maternal Grandfather     Lupus Paternal Grandmother     Thyroid disease Paternal Grandmother     Hydrocephalus Paternal Grandmother     Bipolar disorder Paternal Grandmother     Atrial fibrillation Paternal Grandfather        Social History     Tobacco Use    Smoking status: Never    Smokeless tobacco: Never   Vaping Use    Vaping Use: Never used   Substance Use Topics    Alcohol use: Yes     Comment: Light    Drug use: Never         Review of Systems   REVIEW OF SYSTEMS     Review of Systems   All other systems reviewed and are negative.        VITALS     ED Vitals      Date/Time Temp Pulse Resp BP SpO2 [facility]   07/29/24 0055 -- 90 17 117/77 99 % KM   07/28/24 2250 -- 95 15 125/83 99 % KM   07/28/24 2154 -- 100 15 125/83 -- KM   07/28/24 2055 36.6 °C (97.9 °F) -- -- -- 99 % KM   07/28/24 2053 -- 85 17 120/85 -- KM          Pulse Ox %: 99 % (07/28/24 2114)  Pulse Ox Interpretation: Normal (07/28/24 2114)  Heart Rate: 85 (07/28/24 2114)  Rhythm Strip Interpretation: Normal Sinus Rhythm (07/28/24 2114)     Physical Exam   PHYSICAL EXAM     Physical Exam  Vitals and nursing note reviewed.   Constitutional:       Appearance: She is not ill-appearing, toxic-appearing or diaphoretic.   HENT:      Head: Normocephalic and atraumatic.      Right Ear: External ear normal.      Left Ear: External ear normal.      Nose: Nose normal.      Mouth/Throat:      Lips: Pink.      Mouth: Mucous membranes are dry. No injury or oral lesions.      Tongue: No lesions. Tongue does not deviate from midline.      Palate: No mass and lesions.      Pharynx: Oropharynx is clear. Uvula midline. No pharyngeal swelling, oropharyngeal exudate, posterior oropharyngeal erythema or uvula swelling.      Tonsils: No tonsillar exudate or tonsillar abscesses.   Eyes:       Conjunctiva/sclera: Conjunctivae normal.      Pupils: Pupils are equal, round, and reactive to light.   Cardiovascular:      Rate and Rhythm: Normal rate and regular rhythm.      Pulses: Normal pulses.      Heart sounds: Normal heart sounds. No murmur heard.     No friction rub.   Pulmonary:      Effort: Pulmonary effort is normal. No respiratory distress.      Breath sounds: Normal breath sounds. No wheezing or rales.   Abdominal:      General: Abdomen is flat. There is no distension.      Tenderness: There is no abdominal tenderness. There is no right CVA tenderness, left CVA tenderness or guarding.   Musculoskeletal:         General: No swelling, tenderness, deformity or signs of injury. Normal range of motion.      Cervical back: Normal range of motion and neck supple. No rigidity or tenderness.      Right lower leg: No edema.      Left lower leg: No edema.   Lymphadenopathy:      Cervical: No cervical adenopathy.   Skin:     General: Skin is warm and dry.      Capillary Refill: Capillary refill takes less than 2 seconds.      Coloration: Skin is not jaundiced or pale.   Neurological:      General: No focal deficit present.      Mental Status: She is alert and oriented to person, place, and time.      Cranial Nerves: No cranial nerve deficit.      Motor: No weakness.      Gait: Gait normal.   Psychiatric:         Mood and Affect: Mood normal.         Behavior: Behavior normal.           PROCEDURES     Procedures     DATA     Results       Procedure Component Value Units Date/Time    SARS-COV-2 (COVID-19)/ FLU A/B, AND RSV, PCR Nasopharynx [[id number]]  (Normal) Collected: 07/28/24 2232    Specimen: Nasopharyngeal Swab from Nasopharynx Updated: 07/28/24 2319     SARS-CoV-2 (COVID-19) Negative     Influenza A Negative     Influenza B Negative     Respiratory Syncytial Virus Negative    Narrative:      Testing performed using real-time PCR for detection of COVID-19. EUA approved validation studies performed on  site.     HS Troponin I (with 2 hour reflex) [[id number]]  (Normal) Collected: 07/28/24 2142    Specimen: Blood, Venous Updated: 07/28/24 2254     High Sens Troponin I <2.0 pg/mL     Urinalysis with Reflex Culture [[id number]]  (Abnormal) Collected: 07/28/24 2232    Specimen: Urine, Clean Catch Updated: 07/28/24 2252    Narrative:      The following orders were created for panel order Urinalysis with Reflex Culture.  Procedure                               Abnormality         Status                     ---------                               -----------         ------                     UA Reflex to Culture (Ma...[[id number]]  Abnormal            Final result               UA Microscopic[[id number]]               Abnormal            Final result                 Please view results for these tests on the individual orders.    UA Microscopic [[id number]]  (Abnormal) Collected: 07/28/24 2232    Specimen: Urine, Clean Catch Updated: 07/28/24 2252     RBC, Urine 0 TO 4 /HPF      WBC, Urine 0 TO 3 /HPF      Squamous Epithelial Rare /hpf      Hyaline Cast None Seen /lpf      Bacteria, Urine Rare /HPF      AMORPHOUS CRYSTALS +1 /hpf      Mucus Rare /LPF     UA Reflex to Culture (Macroscopic) [[id number]]  (Abnormal) Collected: 07/28/24 2232    Specimen: Urine, Clean Catch Updated: 07/28/24 2247     Color, Urine Yellow     Clarity, Urine Cloudy     Specific Gravity, Urine 1.019     pH, Urine 7.5     Leukocyte Esterase Negative     Comment: Results can be falsely negative due to high specific gravity, some antibiotics, glucose >3 g/dl, or WBC other than neutrophils.        Nitrite, Urine Negative     Protein, Urine Negative     Glucose, Urine Negative mg/dL      Ketones, Urine Negative mg/dL      Urobilinogen, Urine 0.2 EU/dL      Bilirubin, Urine Negative mg/dL      Blood, Urine Negative     Comment: The sensitivity of the occult blood test is equivalent to approximately 4 intact RBC/HPF.       hCG, serum, qualitative [[id number]]   (Normal) Collected: 07/28/24 2142    Specimen: Blood, Venous Updated: 07/28/24 2229     Preg Test, Serum Negative    Comprehensive metabolic panel [[id number]]  (Normal) Collected: 07/28/24 2142    Specimen: Blood, Venous Updated: 07/28/24 2214     Sodium 138 mEQ/L      Potassium 4.1 mEQ/L      Comment: Results obtained on plasma. Plasma Potassium values may be up to 0.4 mEQ/L less than serum values. The differences may be greater for patients with high platelet or white cell counts.        Chloride 107 mEQ/L      CO2 25 mEQ/L      BUN 10 mg/dL      Creatinine 0.7 mg/dL      Glucose 89 mg/dL      Calcium 9.1 mg/dL      AST (SGOT) 16 IU/L      ALT (SGPT) 11 IU/L      Alkaline Phosphatase 48 IU/L      Total Protein 7.2 g/dL      Comment: Test performed on plasma which typically contains approximately 0.4 g/dL more protein than serum.        Albumin 4.2 g/dL      Bilirubin, Total 0.3 mg/dL      eGFR >60.0 mL/min/1.73m*2      Comment: Calculation based on the Chronic Kidney Disease Epidemiology Collaboration (CKD-EPI) equation refit without adjustment for race.        Anion Gap 6 mEQ/L     CBC and differential [[id number]] Collected: 07/28/24 2142    Specimen: Blood, Venous Updated: 07/28/24 2159     WBC 5.11 K/uL      RBC 4.70 M/uL      Hemoglobin 14.4 g/dL      Hematocrit 42.3 %      MCV 90.0 fL      MCH 30.6 pg      MCHC 34.0 g/dL      RDW 12.1 %      Platelets 264 K/uL      MPV 9.6 fL      Differential Type Auto     nRBC 0.0 %      Immature Granulocytes 0.4 %      Neutrophils 51.7 %      Lymphocytes 39.1 %      Monocytes 6.8 %      Eosinophils 1.4 %      Basophils 0.6 %      Immature Granulocytes, Absolute 0.02 K/uL      Neutrophils, Absolute 2.64 K/uL      Lymphocytes, Absolute 2.00 K/uL      Monocytes, Absolute 0.35 K/uL      Eosinophils, Absolute 0.07 K/uL      Basophils, Absolute 0.03 K/uL     Extra Tube Red [[id number]] Collected: 07/28/24 2142    Specimen: Blood, Venous Updated: 07/28/24 2150    Extra Tubes  [[id number]] Collected: 07/28/24 2142    Specimen: Blood, Venous Updated: 07/28/24 2150    Narrative:      The following orders were created for panel order Extra Tubes.  Procedure                               Abnormality         Status                     ---------                               -----------         ------                     Extra Tube Lt Blue[[id number]]                               In process                 Extra Tube Red[[id number]]                                   In process                 Extra Tube Gold[[id number]]                                  In process                   Please view results for these tests on the individual orders.    Extra Tube Lt Blue [[id number]] Collected: 07/28/24 2142    Specimen: Blood, Venous Updated: 07/28/24 2150    Extra Tube Gold [[id number]] Collected: 07/28/24 2142    Specimen: Blood, Venous Updated: 07/28/24 2150            Imaging Results              X-RAY CHEST 2 VIEWS (Final result)  Result time 07/29/24 07:11:08      Final result                   Impression:    IMPRESSION:  Normal chest x-ray.    COMMENT:    Comparison: Chest x-ray 2/8/2024.    Technique: PA frontal and lateral views of the chest were obtained.    FINDINGS:    The lungs are clear without focal airspace consolidation, pleural effusion or  pneumothorax. The cardiomediastinal silhouette is within normal limits. The  upper abdomen is unremarkable. There is no acute osseous abnormality.                 Narrative:    CLINICAL HISTORY: Dizziness, dehydration or hypotension  Syncope, recurrent                        Preliminary Interpretation    NAD                                    ECG 12 lead          Scoring tools                                  ED Course & MDM   MDM / ED COURSE / CLINICAL IMPRESSION / DISPO     Medical Decision Making  ED Workup: CBC, CMP, Troponin, ECG, CXR, Upreg    Findings: EKG: No e/o STEMI. No evidence of Brugada's sign, delta wave, epsilon wave, significantly  prolonged QTc, or malignant arrhythmia.    Given history, exam and workup, low suspicion for HF, ICH, seizure, stroke, HOCM, ACS, aortic dissection, malignant arrhythmia, or GI bleed.    Disposition: Discharge. Patient is overall well-appearing and workup is reassuring. Return precautions expressed and understood in person. Advised follow up with primary care provider in 2 to 3 days.    Advised patient to follow-up with neurologist as previously planned.  Also advised to follow-up with cardiologist to ensure no further workup needed    See ED course for further details.     Amount and/or Complexity of Data Reviewed  Labs: ordered.  Radiology: ordered and independent interpretation performed.  ECG/medicine tests: ordered.    Risk  Prescription drug management.        ED Course as of 08/08/24 1022   Sun Jul 28, 2024   2302 Blood work is reassuring.  Pregnancy test is negative.  UA is not consistent with UTI.  Chest x-ray is negative.  COVID and flu testing are currently pending. [RR]      ED Course User Index  [RR] Mendel Donohue DO     Clinical Impression      Weakness   Syncope, unspecified syncope type     _________________       ED Disposition   Discharge                       Liz Wilson MD  Resident  08/08/24 1022

## 2024-07-29 NOTE — ED NOTES
Patient discharged home with mother. Patient is alert and oriented,no cardiac or respiratory distress. Able to ambulate without any dizziness. IV was removed with no bleeding. Education provided, teach back performed. Name and  verified on the discharge instructions. No other concerns.      Neda Elias, RN  24 [phone]

## 2024-07-30 ENCOUNTER — CLINICAL SUPPORT (OUTPATIENT)
Dept: FAMILY MEDICINE | Facility: CLINIC | Age: 24
End: 2024-07-30
Payer: COMMERCIAL

## 2024-07-30 DIAGNOSIS — Z23 NEED FOR TUBERCULOSIS VACCINATION: Primary | ICD-10-CM

## 2024-07-30 PROCEDURE — 86580 TB INTRADERMAL TEST: CPT | Performed by: FAMILY MEDICINE

## 2024-08-01 ENCOUNTER — CLINICAL SUPPORT (OUTPATIENT)
Dept: FAMILY MEDICINE | Facility: CLINIC | Age: 24
End: 2024-08-01
Payer: COMMERCIAL

## 2024-08-16 PROCEDURE — 96360 HYDRATION IV INFUSION INIT: CPT

## 2024-08-16 PROCEDURE — 3E0337Z INTRODUCTION OF ELECTROLYTIC AND WATER BALANCE SUBSTANCE INTO PERIPHERAL VEIN, PERCUTANEOUS APPROACH: ICD-10-PCS | Performed by: EMERGENCY MEDICINE

## 2024-08-16 PROCEDURE — 99284 EMERGENCY DEPT VISIT MOD MDM: CPT | Mod: U5,25

## 2024-08-17 ENCOUNTER — HOSPITAL ENCOUNTER (EMERGENCY)
Facility: HOSPITAL | Age: 24
Discharge: HOME | End: 2024-08-17
Attending: EMERGENCY MEDICINE
Payer: COMMERCIAL

## 2024-08-17 ENCOUNTER — HOSPITAL ENCOUNTER (EMERGENCY)
Facility: HOSPITAL | Age: 24
Discharge: HOME | End: 2024-08-17
Attending: STUDENT IN AN ORGANIZED HEALTH CARE EDUCATION/TRAINING PROGRAM
Payer: COMMERCIAL

## 2024-08-17 ENCOUNTER — APPOINTMENT (EMERGENCY)
Dept: RADIOLOGY | Facility: HOSPITAL | Age: 24
End: 2024-08-17
Payer: COMMERCIAL

## 2024-08-17 VITALS
TEMPERATURE: 98.4 F | DIASTOLIC BLOOD PRESSURE: 60 MMHG | HEART RATE: 104 BPM | OXYGEN SATURATION: 98 % | SYSTOLIC BLOOD PRESSURE: 102 MMHG | RESPIRATION RATE: 17 BRPM

## 2024-08-17 VITALS
SYSTOLIC BLOOD PRESSURE: 124 MMHG | TEMPERATURE: 97.8 F | HEART RATE: 91 BPM | RESPIRATION RATE: 16 BRPM | DIASTOLIC BLOOD PRESSURE: 86 MMHG | BODY MASS INDEX: 24.75 KG/M2 | WEIGHT: 145 LBS | HEIGHT: 64 IN | OXYGEN SATURATION: 100 %

## 2024-08-17 DIAGNOSIS — L08.9 LEFT FOOT INFECTION: Primary | ICD-10-CM

## 2024-08-17 DIAGNOSIS — R53.83 FATIGUE, UNSPECIFIED TYPE: ICD-10-CM

## 2024-08-17 DIAGNOSIS — L03.116 CELLULITIS OF LEFT LOWER EXTREMITY: Primary | ICD-10-CM

## 2024-08-17 DIAGNOSIS — N39.0 ACUTE UTI: ICD-10-CM

## 2024-08-17 LAB
ALBUMIN SERPL-MCNC: 4.1 G/DL (ref 3.5–5.7)
ALBUMIN SERPL-MCNC: 4.2 G/DL (ref 3.5–5.7)
ALP SERPL-CCNC: 54 IU/L (ref 34–125)
ALP SERPL-CCNC: 61 IU/L (ref 34–125)
ALT SERPL-CCNC: 13 IU/L (ref 7–52)
ALT SERPL-CCNC: 14 IU/L (ref 7–52)
ANION GAP SERPL CALC-SCNC: 6 MEQ/L (ref 3–15)
ANION GAP SERPL CALC-SCNC: 7 MEQ/L (ref 3–15)
AST SERPL-CCNC: 15 IU/L (ref 13–39)
AST SERPL-CCNC: 15 IU/L (ref 13–39)
BACTERIA URNS QL MICRO: 1 /HPF
BASOPHILS # BLD: 0.02 K/UL (ref 0.01–0.1)
BASOPHILS # BLD: 0.02 K/UL (ref 0.01–0.1)
BASOPHILS NFR BLD: 0.2 %
BASOPHILS NFR BLD: 0.3 %
BILIRUB SERPL-MCNC: 0.3 MG/DL (ref 0.3–1.2)
BILIRUB SERPL-MCNC: 0.6 MG/DL (ref 0.3–1.2)
BILIRUB UR QL STRIP.AUTO: NEGATIVE MG/DL
BUN SERPL-MCNC: 11 MG/DL (ref 7–25)
BUN SERPL-MCNC: 13 MG/DL (ref 7–25)
CALCIUM SERPL-MCNC: 8.9 MG/DL (ref 8.6–10.3)
CALCIUM SERPL-MCNC: 9.1 MG/DL (ref 8.6–10.3)
CHLORIDE SERPL-SCNC: 105 MEQ/L (ref 98–107)
CHLORIDE SERPL-SCNC: 105 MEQ/L (ref 98–107)
CLARITY UR REFRACT.AUTO: ABNORMAL
CO2 SERPL-SCNC: 24 MEQ/L (ref 21–31)
CO2 SERPL-SCNC: 26 MEQ/L (ref 21–31)
COLOR UR AUTO: YELLOW
CREAT SERPL-MCNC: 0.6 MG/DL (ref 0.6–1.2)
CREAT SERPL-MCNC: 0.7 MG/DL (ref 0.6–1.2)
DIFFERENTIAL METHOD BLD: ABNORMAL
DIFFERENTIAL METHOD BLD: NORMAL
EGFRCR SERPLBLD CKD-EPI 2021: >60 ML/MIN/1.73M*2
EGFRCR SERPLBLD CKD-EPI 2021: >60 ML/MIN/1.73M*2
EOSINOPHIL # BLD: 0.02 K/UL (ref 0.04–0.36)
EOSINOPHIL # BLD: 0.04 K/UL (ref 0.04–0.36)
EOSINOPHIL NFR BLD: 0.2 %
EOSINOPHIL NFR BLD: 0.6 %
ERYTHROCYTE [DISTWIDTH] IN BLOOD BY AUTOMATED COUNT: 12.3 % (ref 11.7–14.4)
ERYTHROCYTE [DISTWIDTH] IN BLOOD BY AUTOMATED COUNT: 12.4 % (ref 11.7–14.4)
FLUAV RNA SPEC QL NAA+PROBE: NEGATIVE
FLUBV RNA SPEC QL NAA+PROBE: NEGATIVE
GLUCOSE SERPL-MCNC: 79 MG/DL (ref 70–99)
GLUCOSE SERPL-MCNC: 94 MG/DL (ref 70–99)
GLUCOSE UR STRIP.AUTO-MCNC: NEGATIVE MG/DL
HCG UR QL: NEGATIVE
HCT VFR BLD AUTO: 40.5 % (ref 35–45)
HCT VFR BLD AUTO: 42.4 % (ref 35–45)
HGB BLD-MCNC: 13.9 G/DL (ref 11.8–15.7)
HGB BLD-MCNC: 14.4 G/DL (ref 11.8–15.7)
HGB UR QL STRIP.AUTO: NEGATIVE
HYALINE CASTS #/AREA URNS LPF: ABNORMAL /LPF
IMM GRANULOCYTES # BLD AUTO: 0.03 K/UL (ref 0–0.08)
IMM GRANULOCYTES # BLD AUTO: 0.04 K/UL (ref 0–0.08)
IMM GRANULOCYTES NFR BLD AUTO: 0.4 %
IMM GRANULOCYTES NFR BLD AUTO: 0.4 %
KETONES UR STRIP.AUTO-MCNC: NEGATIVE MG/DL
LEUKOCYTE ESTERASE UR QL STRIP.AUTO: 1
LYMPHOCYTES # BLD: 0.48 K/UL (ref 1.2–3.5)
LYMPHOCYTES # BLD: 1.98 K/UL (ref 1.2–3.5)
LYMPHOCYTES NFR BLD: 27.7 %
LYMPHOCYTES NFR BLD: 4.6 %
MCH RBC QN AUTO: 31 PG (ref 28–33.2)
MCH RBC QN AUTO: 31.6 PG (ref 28–33.2)
MCHC RBC AUTO-ENTMCNC: 34 G/DL (ref 32.2–35.5)
MCHC RBC AUTO-ENTMCNC: 34.3 G/DL (ref 32.2–35.5)
MCV RBC AUTO: 90.4 FL (ref 83–98)
MCV RBC AUTO: 93 FL (ref 83–98)
MONOCYTES # BLD: 0.37 K/UL (ref 0.28–0.8)
MONOCYTES # BLD: 0.42 K/UL (ref 0.28–0.8)
MONOCYTES NFR BLD: 3.6 %
MONOCYTES NFR BLD: 5.9 %
MUCOUS THREADS URNS QL MICRO: 3 /LPF
NEUTROPHILS # BLD: 4.66 K/UL (ref 1.7–7)
NEUTROPHILS # BLD: 9.49 K/UL (ref 1.7–7)
NEUTS SEG NFR BLD: 65.1 %
NEUTS SEG NFR BLD: 91 %
NITRITE UR QL STRIP.AUTO: NEGATIVE
NRBC BLD-RTO: 0 %
NRBC BLD-RTO: 0 %
PDW BLD AUTO: 9.7 FL (ref 9.4–12.3)
PDW BLD AUTO: 9.8 FL (ref 9.4–12.3)
PH UR STRIP.AUTO: 6 [PH]
PLATELET # BLD AUTO: 196 K/UL (ref 150–369)
PLATELET # BLD AUTO: 270 K/UL (ref 150–369)
POTASSIUM SERPL-SCNC: 3.8 MEQ/L (ref 3.5–5.1)
POTASSIUM SERPL-SCNC: 4.1 MEQ/L (ref 3.5–5.1)
PROT SERPL-MCNC: 7.1 G/DL (ref 6–8.2)
PROT SERPL-MCNC: 7.1 G/DL (ref 6–8.2)
PROT UR QL STRIP.AUTO: ABNORMAL
RBC # BLD AUTO: 4.48 M/UL (ref 3.93–5.22)
RBC # BLD AUTO: 4.56 M/UL (ref 3.93–5.22)
RBC #/AREA URNS HPF: ABNORMAL /HPF
RSV RNA SPEC QL NAA+PROBE: NEGATIVE
SARS-COV-2 RNA RESP QL NAA+PROBE: NEGATIVE
SODIUM SERPL-SCNC: 135 MEQ/L (ref 136–145)
SODIUM SERPL-SCNC: 138 MEQ/L (ref 136–145)
SP GR UR REFRACT.AUTO: 1.02
SQUAMOUS URNS QL MICRO: ABNORMAL /HPF
UROBILINOGEN UR STRIP-ACNC: 0.2 EU/DL
WBC # BLD AUTO: 10.42 K/UL (ref 3.8–10.5)
WBC # BLD AUTO: 7.15 K/UL (ref 3.8–10.5)
WBC #/AREA URNS HPF: ABNORMAL /HPF

## 2024-08-17 PROCEDURE — 85025 COMPLETE CBC W/AUTO DIFF WBC: CPT | Performed by: EMERGENCY MEDICINE

## 2024-08-17 PROCEDURE — 96365 THER/PROPH/DIAG IV INF INIT: CPT

## 2024-08-17 PROCEDURE — 85025 COMPLETE CBC W/AUTO DIFF WBC: CPT

## 2024-08-17 PROCEDURE — 99284 EMERGENCY DEPT VISIT MOD MDM: CPT | Mod: U5,25

## 2024-08-17 PROCEDURE — 87086 URINE CULTURE/COLONY COUNT: CPT | Performed by: PHYSICIAN ASSISTANT

## 2024-08-17 PROCEDURE — 73630 X-RAY EXAM OF FOOT: CPT | Mod: LT

## 2024-08-17 PROCEDURE — 3E03329 INTRODUCTION OF OTHER ANTI-INFECTIVE INTO PERIPHERAL VEIN, PERCUTANEOUS APPROACH: ICD-10-PCS | Performed by: STUDENT IN AN ORGANIZED HEALTH CARE EDUCATION/TRAINING PROGRAM

## 2024-08-17 PROCEDURE — 36415 COLL VENOUS BLD VENIPUNCTURE: CPT | Performed by: EMERGENCY MEDICINE

## 2024-08-17 PROCEDURE — 87637 SARSCOV2&INF A&B&RSV AMP PRB: CPT | Performed by: STUDENT IN AN ORGANIZED HEALTH CARE EDUCATION/TRAINING PROGRAM

## 2024-08-17 PROCEDURE — 96375 TX/PRO/DX INJ NEW DRUG ADDON: CPT

## 2024-08-17 PROCEDURE — 63600000 HC DRUGS/DETAIL CODE: Mod: JZ | Performed by: PHYSICIAN ASSISTANT

## 2024-08-17 PROCEDURE — 3E033GC INTRODUCTION OF OTHER THERAPEUTIC SUBSTANCE INTO PERIPHERAL VEIN, PERCUTANEOUS APPROACH: ICD-10-PCS | Performed by: STUDENT IN AN ORGANIZED HEALTH CARE EDUCATION/TRAINING PROGRAM

## 2024-08-17 PROCEDURE — 3E0333Z INTRODUCTION OF ANTI-INFLAMMATORY INTO PERIPHERAL VEIN, PERCUTANEOUS APPROACH: ICD-10-PCS | Performed by: STUDENT IN AN ORGANIZED HEALTH CARE EDUCATION/TRAINING PROGRAM

## 2024-08-17 PROCEDURE — 85025 COMPLETE CBC W/AUTO DIFF WBC: CPT | Performed by: STUDENT IN AN ORGANIZED HEALTH CARE EDUCATION/TRAINING PROGRAM

## 2024-08-17 PROCEDURE — 81001 URINALYSIS AUTO W/SCOPE: CPT | Performed by: PHYSICIAN ASSISTANT

## 2024-08-17 PROCEDURE — 84703 CHORIONIC GONADOTROPIN ASSAY: CPT | Performed by: EMERGENCY MEDICINE

## 2024-08-17 PROCEDURE — 87070 CULTURE OTHR SPECIMN AEROBIC: CPT | Mod: 59 | Performed by: PHYSICIAN ASSISTANT

## 2024-08-17 PROCEDURE — 63700000 HC SELF-ADMINISTRABLE DRUG: Performed by: PHYSICIAN ASSISTANT

## 2024-08-17 PROCEDURE — 25800000 HC PHARMACY IV SOLUTIONS: Performed by: PHYSICIAN ASSISTANT

## 2024-08-17 PROCEDURE — 25800000 HC PHARMACY IV SOLUTIONS: Performed by: STUDENT IN AN ORGANIZED HEALTH CARE EDUCATION/TRAINING PROGRAM

## 2024-08-17 PROCEDURE — 80053 COMPREHEN METABOLIC PANEL: CPT | Performed by: EMERGENCY MEDICINE

## 2024-08-17 PROCEDURE — 87205 SMEAR GRAM STAIN: CPT | Performed by: PHYSICIAN ASSISTANT

## 2024-08-17 PROCEDURE — 93005 ELECTROCARDIOGRAM TRACING: CPT | Performed by: EMERGENCY MEDICINE

## 2024-08-17 PROCEDURE — 71046 X-RAY EXAM CHEST 2 VIEWS: CPT

## 2024-08-17 RX ORDER — SULFAMETHOXAZOLE AND TRIMETHOPRIM 800; 160 MG/1; MG/1
1 TABLET ORAL 2 TIMES DAILY
Qty: 14 TABLET | Refills: 0 | Status: SHIPPED | OUTPATIENT
Start: 2024-08-17 | End: 2024-08-24

## 2024-08-17 RX ORDER — KETOROLAC TROMETHAMINE 15 MG/ML
15 INJECTION, SOLUTION INTRAMUSCULAR; INTRAVENOUS ONCE
Status: COMPLETED | OUTPATIENT
Start: 2024-08-17 | End: 2024-08-17

## 2024-08-17 RX ORDER — TRAMADOL HYDROCHLORIDE 50 MG/1
50 TABLET ORAL ONCE
Status: COMPLETED | OUTPATIENT
Start: 2024-08-17 | End: 2024-08-17

## 2024-08-17 RX ORDER — TRAMADOL HYDROCHLORIDE 50 MG/1
50 TABLET ORAL EVERY 6 HOURS PRN
Qty: 7 TABLET | Refills: 0 | Status: SHIPPED | OUTPATIENT
Start: 2024-08-17

## 2024-08-17 RX ORDER — ONDANSETRON HYDROCHLORIDE 2 MG/ML
4 INJECTION, SOLUTION INTRAVENOUS ONCE
Status: COMPLETED | OUTPATIENT
Start: 2024-08-17 | End: 2024-08-17

## 2024-08-17 RX ORDER — CEPHALEXIN 500 MG/1
500 CAPSULE ORAL 4 TIMES DAILY
Qty: 28 CAPSULE | Refills: 0 | Status: SHIPPED | OUTPATIENT
Start: 2024-08-17 | End: 2024-08-24

## 2024-08-17 RX ADMIN — CEFAZOLIN 2 G: 2 INJECTION, POWDER, FOR SOLUTION INTRAMUSCULAR; INTRAVENOUS at 16:17

## 2024-08-17 RX ADMIN — ONDANSETRON HYDROCHLORIDE 4 MG: 2 SOLUTION INTRAMUSCULAR; INTRAVENOUS at 16:17

## 2024-08-17 RX ADMIN — KETOROLAC TROMETHAMINE 15 MG: 15 INJECTION, SOLUTION INTRAMUSCULAR; INTRAVENOUS at 16:15

## 2024-08-17 RX ADMIN — TRAMADOL HYDROCHLORIDE 50 MG: 50 TABLET, COATED ORAL at 17:21

## 2024-08-17 RX ADMIN — SODIUM CHLORIDE 1000 ML: 9 INJECTION, SOLUTION INTRAVENOUS at 00:45

## 2024-08-17 RX ADMIN — SODIUM CHLORIDE 1000 ML: 9 INJECTION, SOLUTION INTRAVENOUS at 16:13

## 2024-08-17 ASSESSMENT — ENCOUNTER SYMPTOMS
FATIGUE: 1
SHORTNESS OF BREATH: 0
BACK PAIN: 0
DIZZINESS: 1
ARTHRALGIAS: 0
ACTIVITY CHANGE: 1
ABDOMINAL PAIN: 1
FATIGUE: 1
DYSURIA: 0
NAUSEA: 0
HEADACHES: 0
TREMORS: 0
FEVER: 0
VOMITING: 0
APPETITE CHANGE: 1
COLOR CHANGE: 1
COUGH: 0
DIFFICULTY URINATING: 0
LIGHT-HEADEDNESS: 1
WEAKNESS: 0
PALPITATIONS: 0
CHEST TIGHTNESS: 0
WOUND: 1
NUMBNESS: 0
SHORTNESS OF BREATH: 1
FLANK PAIN: 0
FREQUENCY: 0
WHEEZING: 0

## 2024-08-17 NOTE — ED PROVIDER NOTES
Emergency Medicine Note  HPI   HISTORY OF PRESENT ILLNESS     The patient is a 24-year-old female with history of ovarian cyst, hypothyroidism, POTS, seizure, migraine, chronic UTI, PTSD, Margo-Danlos, gastroparesis, fatigue presents emergency department for evaluation of generalized weakness and concerns for cellulitis.  She notes that she noticed a red area to the lateral aspect of the forefoot that spread today into the midfoot.  She gave herself bacitracin ointment which is improved, however she notes continued pain and redness at the area.  She also notes other generalized symptoms but is most concerned about her foot.  She notes generalized weakness over the past few days, however today while traveling in the airport she felt very weak and had a presyncopal episode.  She notes shortness of breath at baseline but is increased over the past week.  Denies history of DVT/PE, calf pain or swelling, OCPs.  She also notes left lower quadrant abdominal pain that seems to be chronic as she was seen in the emergency department in July 2024 with similar symptoms.  Denies diarrhea, constipation.          Patient History   PAST HISTORY     Reviewed from Nursing Triage:       Past Medical History:   Diagnosis Date    Anxiety     Concussion     Margo-Danlos syndrome     Hypoglycemia     PCOS (polycystic ovarian syndrome)     POP (persistent occipitoposterior position)     POTS (postural orthostatic tachycardia syndrome)     PTSD (post-traumatic stress disorder)        Past Surgical History:   Procedure Laterality Date    APPENDECTOMY      SKIN BIOPSY         Family History   Problem Relation Age of Onset    Diabetes Biological Mother     Bipolar disorder Biological Father     JOSE RAUL disease Biological Father     No Known Problems Biological Sister         gastroparesis unknown     Kidney disease Maternal Grandmother     Hyperlipidemia Maternal Grandmother     Diabetes Maternal Grandfather     Heart disease Maternal  Grandfather     Lupus Paternal Grandmother     Thyroid disease Paternal Grandmother     Hydrocephalus Paternal Grandmother     Bipolar disorder Paternal Grandmother     Atrial fibrillation Paternal Grandfather        Social History     Tobacco Use    Smoking status: Never    Smokeless tobacco: Never   Vaping Use    Vaping Use: Never used   Substance Use Topics    Alcohol use: Yes     Comment: Light    Drug use: Never         Review of Systems   REVIEW OF SYSTEMS     Review of Systems   Constitutional:  Positive for activity change, appetite change and fatigue. Negative for fever.   Respiratory:  Positive for shortness of breath. Negative for cough, chest tightness and wheezing.    Cardiovascular:  Negative for chest pain and palpitations.   Gastrointestinal:  Positive for abdominal pain. Negative for nausea and vomiting.   Genitourinary:  Negative for difficulty urinating, dysuria, flank pain, frequency, pelvic pain, urgency and vaginal bleeding.   Musculoskeletal:  Negative for arthralgias and back pain.   Neurological:  Positive for dizziness and light-headedness. Negative for tremors, syncope, weakness and headaches.         VITALS     ED Vitals      Date/Time Temp Pulse Resp BP SpO2 [facility]   08/17/24 0003 36.6 °C (97.8 °F) 91 16 124/86 100 % JL                         Physical Exam   PHYSICAL EXAM     Physical Exam  Vitals and nursing note reviewed.   Constitutional:       Appearance: Normal appearance. She is normal weight.   HENT:      Head: Normocephalic and atraumatic.      Nose: Nose normal.      Mouth/Throat:      Mouth: Mucous membranes are dry.      Pharynx: Oropharynx is clear.   Eyes:      Extraocular Movements: Extraocular movements intact.      Conjunctiva/sclera: Conjunctivae normal.   Cardiovascular:      Rate and Rhythm: Normal rate and regular rhythm.      Pulses: Normal pulses.      Heart sounds: Normal heart sounds. No murmur heard.  Pulmonary:      Effort: Pulmonary effort is normal.      Breath  sounds: Examination of the right-lower field reveals decreased breath sounds. Decreased breath sounds present.      Comments: Decreased breath sounds at the right lower lobe.  Otherwise no adventitious breath sounds on exam.  Abdominal:      General: Abdomen is flat. There is no distension.      Tenderness: There is no abdominal tenderness.      Comments: Abdomen is soft, nontender, nondistended   Musculoskeletal:         General: No swelling or tenderness. Normal range of motion.      Cervical back: Normal range of motion.   Skin:     General: Skin is warm and dry.   Neurological:      General: No focal deficit present.      Mental Status: She is alert and oriented to person, place, and time.   Psychiatric:         Mood and Affect: Mood normal.         Behavior: Behavior normal.         Thought Content: Thought content normal.         Judgment: Judgment normal.           PROCEDURES     Procedures     DATA     Results       None            Imaging Results    None         ECG 12 lead   Independent Interpretation by ED Provider   Normal sinus rhythm, no ST elevation or significant t wave changes          Scoring tools                                  ED Course & MDM   MDM / ED COURSE / CLINICAL IMPRESSION / DISPO     Medical Decision Making  Patient is a 24-year-old female presenting to the emergency department for evaluation of generalized weakness, presyncopal episode, and left foot infection.  On exam she is well-appearing in no acute distress.  There is an area of erythema at the left lateral forefoot that was mildly tender to palpation but without appreciable warmth.  She was neurovascularly intact at the left lower extremity.  She also noted shortness of breath, urinary frequency and my evaluation.  Lungs clear to auscultation bilaterally, no signs of respiratory distress.  Abdomen is soft, nontender, nondistended.Lab work today reassuring.  Chest x-ray without acute disease.  Patient was started on fluids.   Patient requesting discharge and the ED.  Will start her on Keflex outpatient for suspected cellulitic infection.  She is to follow-up with her PCP outpatient for further evaluation and management.  Patient eloped prior to getting her discharge paperwork    Amount and/or Complexity of Data Reviewed  Labs: ordered. Decision-making details documented in ED Course.  Radiology: ordered.  ECG/medicine tests: ordered and independent interpretation performed.    Risk  Prescription drug management.        ED Course as of 08/17/24 0046   Sat Aug 17, 2024   0045 Pression: Left foot redness, erythema started today associate presyncopal episode and generalized weakness    Plan: Labs, UA, beta-hCG, viral panel, fluids, chest x-ray, EKG, reeval [MO]   0046 Sodium: 138 [MO]   0046 WBC: 7.15 [MO]   0046 Preg Test, Serum: Negative [MO]      ED Course User Index  [MO] Robert Saeed PA C     Clinical Impression      None                 Robert Saeed PA C  08/17/24 0536

## 2024-08-17 NOTE — ED NOTES
Discharge instructions reviewed.  Pt encouraged to follow up with PCP.  Medications to be picked up at pharmacy reviewed and education provided.  Pt encouraged to complete ATB and not skip dose.  She verbalized understanding of discharge instructions and she exited ED.       Lavinia Jo RN  08/17/24 8637

## 2024-08-17 NOTE — ED ATTESTATION NOTE
Procedures  Physical Exam  Review of Systems  Medical Decision Making  Amount and/or Complexity of Data Reviewed  Labs: ordered. Decision-making details documented in ED Course.  Radiology: ordered. Decision-making details documented in ED Course.    Risk  Prescription drug management.        46:22 PM  I have personally seen and examined the patient. I was involved in the care and medical decision making for this patient.    I reviewed and agree with the PA/NP/Resident's assessment and plan of care, with any exceptions as documented below.    My focused history, examination, assessment, and plan of care of Kyaw Harrison is as follows:  The patient presents with left foot cellulitis. Patient with redness and pain over the lateral aspect of the left foot, redness tracking up the foot today. Has history of multiple recent cellulitis/abscess. No fever, chills, nausea/vomiting.    Exam: Extremities: Erythema over the lateral left midfoot with area of tracking erythema up the left foot and ankle.  No bony tenderness.  DP/PT 2+ bilaterally, sensation light touch intact in BLE.    Impression/Plan/Medical Decision Makin-year-old female who presents for left foot cellulitis, no abscess.  Neurovascular intact.  No signs of sepsis.    Plan for 1 dose of IV Ancef, discharged with p.o. antibiotics      Vital Signs Review: Vital signs have been reviewed. The oxygen saturation is  SpO2: 97 % which is normal.    I was physically present for the key/critical portions of the following procedures: None    This document was created using Dragon dictation software.  There might be some typographical errors due to this technology.    We are in a period of time with increased volumes and decreased capacity.      Garrett Marques MD  [id number]

## 2024-08-17 NOTE — LETTER
August 17, 2024    Patient: Kyaw Harrison   YOB: 2000   Date of Visit: 8/17/2024       To Whom It May Concern:    Kyaw Harrison was seen and treated in our emergency department on 8/17/2024. She may return to work 8/20/2024.    If you have any questions or concerns, please don't hesitate to call.

## 2024-08-17 NOTE — ED PROVIDER NOTES
Emergency Medicine Note  HPI   HISTORY OF PRESENT ILLNESS     24-year-old female with a history of POTS, Margo-Danlos syndrome, anxiety who presents to the emergency department with complaints of pain in the left foot.  She states she was in [place] with her grandmother on the beach and is unsure whether or not she may have stepped on something.  She states she noticed some redness and swelling last night and came to the emergency department to be seen.  She was evaluated and was going to be placed on antibiotics but states she eloped from the ER because she did not want to wait any longer.  She states she noticed some increased redness and a small pustule.  She also states she has been feeling fatigued.      History provided by:  Patient   used: No    Abscess  Location:  Foot  Foot abscess location:  L foot  Abscess quality: painful, redness and warmth    Red streaking: yes    Duration:  2 days  Pain details:     Quality:  Aching    Severity:  Mild    Timing:  Constant    Progression:  Worsening  Chronicity:  New  Context: not diabetes    Relieved by:  Nothing  Associated symptoms: fatigue          Patient History   PAST HISTORY     Reviewed from Nursing Triage:       Past Medical History:   Diagnosis Date    Anxiety     Concussion     Margo-Danlos syndrome     Hypoglycemia     PCOS (polycystic ovarian syndrome)     POP (persistent occipitoposterior position)     POTS (postural orthostatic tachycardia syndrome)     PTSD (post-traumatic stress disorder)        Past Surgical History:   Procedure Laterality Date    APPENDECTOMY      SKIN BIOPSY         Family History   Problem Relation Age of Onset    Diabetes Biological Mother     Bipolar disorder Biological Father     JOSE RAUL disease Biological Father     No Known Problems Biological Sister         gastroparesis unknown     Kidney disease Maternal Grandmother     Hyperlipidemia Maternal Grandmother     Diabetes Maternal Grandfather     Heart  disease Maternal Grandfather     Lupus Paternal Grandmother     Thyroid disease Paternal Grandmother     Hydrocephalus Paternal Grandmother     Bipolar disorder Paternal Grandmother     Atrial fibrillation Paternal Grandfather        Social History     Tobacco Use    Smoking status: Never    Smokeless tobacco: Never   Vaping Use    Vaping Use: Never used   Substance Use Topics    Alcohol use: Yes     Comment: Light    Drug use: Never         Review of Systems   REVIEW OF SYSTEMS     Review of Systems   Constitutional:  Positive for fatigue.   Respiratory:  Negative for shortness of breath.    Skin:  Positive for color change and wound.   Neurological:  Negative for numbness.   All other systems reviewed and are negative.        VITALS     ED Vitals      Date/Time Temp Pulse Resp BP SpO2 [facility]   08/17/24 1703 36.9 °C (98.4 °F) 104 17 102/60 98 % MG   08/17/24 1359 37.2 °C (99 °F) 110 18 114/56 97 % VNB          Pulse Ox %: 97 % (08/17/24 1621)  Pulse Ox Interpretation: Normal (08/17/24 1621)           Physical Exam   PHYSICAL EXAM     Physical Exam  Vitals and nursing note reviewed.   Constitutional:       Appearance: Normal appearance.   HENT:      Head: Normocephalic and atraumatic.   Cardiovascular:      Rate and Rhythm: Regular rhythm. Tachycardia present.      Pulses: Normal pulses.      Heart sounds: Normal heart sounds.   Pulmonary:      Effort: Pulmonary effort is normal.      Breath sounds: Normal breath sounds.   Skin:     Capillary Refill: Capillary refill takes less than 2 seconds.      Findings: Erythema present.   Neurological:      General: No focal deficit present.      Mental Status: She is alert and oriented to person, place, and time.           PROCEDURES     Procedures     DATA     Results       Procedure Component Value Units Date/Time    UA w/ reflex culture (ED Only) [[id number]]  (Abnormal) Collected: 08/17/24 1715    Specimen: Urine, Clean Catch Updated: 08/17/24 1738    Narrative:      The  following orders were created for panel order UA w/ reflex culture (ED Only).  Procedure                               Abnormality         Status                     ---------                               -----------         ------                     UA Reflex to Culture (Ma...[[id number]]  Abnormal            Final result               UA Microscopic[[id number]]               Abnormal            Final result                 Please view results for these tests on the individual orders.    UA Microscopic [[id number]]  (Abnormal) Collected: 08/17/24 1715    Specimen: Urine, Clean Catch Updated: 08/17/24 1738     RBC, Urine 0 TO 4 /HPF      WBC, Urine 4 TO 10 /HPF      Squamous Epithelial Rare /hpf      Hyaline Cast None Seen /lpf      Bacteria, Urine +1 /HPF      Mucus +3 /LPF     UA Reflex to Culture (Macroscopic) [[id number]]  (Abnormal) Collected: 08/17/24 1715    Specimen: Urine, Clean Catch Updated: 08/17/24 1729     Color, Urine Yellow     Clarity, Urine Cloudy     Specific Gravity, Urine 1.022     pH, Urine 6.0     Leukocyte Esterase +1     Nitrite, Urine Negative     Protein, Urine Trace     Comment: Trace False Positive Protein can be seen with alkaline or highly buffered urines or urine with high specific gravity. Suggest clinical correlation.        Glucose, Urine Negative mg/dL      Ketones, Urine Negative mg/dL      Urobilinogen, Urine 0.2 EU/dL      Bilirubin, Urine Negative mg/dL      Blood, Urine Negative     Comment: The sensitivity of the occult blood test is equivalent to approximately 4 intact RBC/HPF.       Aerobic Bacterial Culture/Smear Foot, Left [[id number]] Collected: 08/17/24 1620    Specimen: Wound Swab from Foot, Left Updated: 08/17/24 1625    Comprehensive metabolic panel [[id number]]  (Abnormal) Collected: 08/17/24 1404    Specimen: Blood, Venous Updated: 08/17/24 1448     Sodium 135 mEQ/L      Potassium 4.1 mEQ/L      Comment: Results obtained on plasma. Plasma Potassium values may be up to  0.4 mEQ/L less than serum values. The differences may be greater for patients with high platelet or white cell counts.        Chloride 105 mEQ/L      CO2 24 mEQ/L      BUN 11 mg/dL      Creatinine 0.6 mg/dL      Glucose 94 mg/dL      Calcium 8.9 mg/dL      AST (SGOT) 15 IU/L      ALT (SGPT) 14 IU/L      Alkaline Phosphatase 61 IU/L      Total Protein 7.1 g/dL      Comment: Test performed on plasma which typically contains approximately 0.4 g/dL more protein than serum.        Albumin 4.1 g/dL      Bilirubin, Total 0.6 mg/dL      eGFR >60.0 mL/min/1.73m*2      Comment: Calculation based on the Chronic Kidney Disease Epidemiology Collaboration (CKD-EPI) equation refit without adjustment for race.        Anion Gap 6 mEQ/L     CBC and differential [[id number]]  (Abnormal) Collected: 08/17/24 1404    Specimen: Blood, Venous Updated: 08/17/24 1425     WBC 10.42 K/uL      RBC 4.48 M/uL      Hemoglobin 13.9 g/dL      Hematocrit 40.5 %      MCV 90.4 fL      MCH 31.0 pg      MCHC 34.3 g/dL      RDW 12.4 %      Platelets 196 K/uL      MPV 9.7 fL      Differential Type Auto     nRBC 0.0 %      Immature Granulocytes 0.4 %      Neutrophils 91.0 %      Lymphocytes 4.6 %      Monocytes 3.6 %      Eosinophils 0.2 %      Basophils 0.2 %      Immature Granulocytes, Absolute 0.04 K/uL      Neutrophils, Absolute 9.49 K/uL      Lymphocytes, Absolute 0.48 K/uL      Monocytes, Absolute 0.37 K/uL      Eosinophils, Absolute 0.02 K/uL      Basophils, Absolute 0.02 K/uL     RAINBOW RED [[id number]] Collected: 08/17/24 1404    Specimen: Blood, Venous Updated: 08/17/24 1411    [place] Draw Panel [[id number]] Collected: 08/17/24 1404    Specimen: Blood, Venous Updated: 08/17/24 1411    Narrative:      The following orders were created for panel order [place] Draw Panel.  Procedure                               Abnormality         Status                     ---------                               -----------         ------                     RAINBOW  RED[[id number]]                                      In process                 RAINBOW LT BLUE[[id number]]                                  In process                 RAINBOW GOLD[[id number]]                                     In process                   Please view results for these tests on the individual orders.    RAINBOW LT BLUE [[id number]] Collected: 08/17/24 1404    Specimen: Blood, Venous Updated: 08/17/24 1411    MICHAEL GOLD [[id number]] Collected: 08/17/24 1404    Specimen: Blood, Venous Updated: 08/17/24 1411            Imaging Results              X-RAY FOOT LEFT 3+ VIEWS (Final result)  Result time 08/17/24 16:05:52      Final result                   Impression:    IMPRESSION:  No radiopaque foreign body               Narrative:      CLINICAL HISTORY: r/o fb  COMMENT:  4  view examination of the left foot shows no fracture or dislocation of bone  structures.  No other significant bone abnormality is noted.  The joint spaces  are intact. No radiopaque foreign body.                                      No orders to display       Scoring tools                                  ED Course & MDM   MDM / ED COURSE / CLINICAL IMPRESSION / DISPO     Medical Decision Making  24-year-old female who presents to the emergency department with complaints of pain and redness to the left foot for the past 2 days.  She was evaluated here earlier this morning but eloped prior to being seen.  A prescription was sent for Keflex to the pharmacy but she states she cannot take Keflex because it does not work.  She states Bactrim has helped her previously.  I unroofed a small pustule and sent it for culture.  X-ray showing no evidence of foreign body.  She also complained of some mild dysuria which shows 4-10 whites and positive leukocyte esterase.  1+ bacteria.  Advised that the antibiotic should cover both the urine and the foot.  Keep her leg elevated and follow-up with her family doctor.  Return for worsening symptoms.   Patient comfortable with plan.    Problems Addressed:  Acute UTI: acute illness or injury  Cellulitis of left lower extremity: acute illness or injury    Amount and/or Complexity of Data Reviewed  Labs: ordered. Decision-making details documented in ED Course.  Radiology: ordered. Decision-making details documented in ED Course.    Risk  OTC drugs.  Prescription drug management.        ED Course as of 08/17/24 1803   Sat Aug 17, 2024   1451 WBC: 10.42 [CB]   1612 X-RAY FOOT LEFT 3+ VIEWS  4  view examination of the left foot shows no fracture or dislocation of bone  structures.  No other significant bone abnormality is noted.  The joint spaces  are intact. No radiopaque foreign body.     --  IMPRESSION:  No radiopaque foreign body   [CB]   1735 Clarity, Urine(!): Cloudy [CB]   1735 Leukocyte Esterase(!): +1 [CB]   1738 WBC, Urine(!): 4 TO 10 [CB]   1738 Squamous Epithelial(!): Rare [CB]   1738 Bacteria, Urine(!): +1 [CB]      ED Course User Index  [CB] Pretty Gregory PA     Clinical Impression      Cellulitis of left lower extremity   Acute UTI     _________________       ED Disposition   Discharge                       Pretty Gregory PA  08/17/24 1803

## 2024-08-17 NOTE — DISCHARGE INSTRUCTIONS
Keep leg elevated.  Take antibiotic as prescribed.  This should cover both the cellulitis and the urinary tract infection.  Follow-up for the result of the wound culture and the urine culture.  Take medication as needed for pain.  Follow-up with your family doctor for further evaluation.  Return to the emergency department for any worsening symptoms.

## 2024-08-18 ENCOUNTER — NURSE TRIAGE (OUTPATIENT)
Dept: PRIMARY CARE | Facility: CLINIC | Age: 24
End: 2024-08-18
Payer: COMMERCIAL

## 2024-08-18 ENCOUNTER — NURSE TRIAGE (OUTPATIENT)
Dept: FAMILY MEDICINE | Facility: CLINIC | Age: 24
End: 2024-08-18
Payer: COMMERCIAL

## 2024-08-18 LAB
ATRIAL RATE: 82
P AXIS: 70
PR INTERVAL: 146
QRS DURATION: 78
QT INTERVAL: 372
QTC CALCULATION(BAZETT): 434
R AXIS: 85
T WAVE AXIS: 66
VENTRICULAR RATE: 82

## 2024-08-18 PROCEDURE — 93010 ELECTROCARDIOGRAM REPORT: CPT | Performed by: STUDENT IN AN ORGANIZED HEALTH CARE EDUCATION/TRAINING PROGRAM

## 2024-08-18 NOTE — TELEPHONE ENCOUNTER
"Patient was in ED yesterday due to left foot swelling, redness. Was given antibiotics and told infection. Today her temp is 101, Tylenol/Advil helps reduce temp. She wanted to know if she should go back to the ED \"it is really not any better today.\"     Aware antibiotics may take 24-48 hours to work. If Tylenol/Advil are not controlling temp, she return to ED. If not having any relief tomorrow or worsens today, to back to ED. Otherwise can fu in office this week for evaluation if needed. Agrees with plan.   "

## 2024-08-18 NOTE — ED ATTESTATION NOTE
Procedures  Physical Exam  Review of Systems  Medical Decision Making  Amount and/or Complexity of Data Reviewed  Labs: ordered. Decision-making details documented in ED Course.  Radiology: ordered.  ECG/medicine tests: ordered and independent interpretation performed.    Risk  Prescription drug management.        8/18/20248:46 AM  I have personally seen and examined the patient.  I personally performed the key components of the encounter and provided a substantive portion of the care and medical decision making for this patient. I reviewed and agree with the PA/NP/Resident's assessment and plan of care, with any exceptions as documented below    My focused history, examination, assessment, and plan of care of Kyaw Harrison is as follows:  The history is provided by Patient  The patient is a 24 y.o. who comes to the ED for pain in the foot, patient also with some lightheadedness, patient concerned about the possibility of infection there, patient otherwise denies any recent fevers or chills, denies any clear trauma,      Pertinent past medical history includes:   Past Medical History:   Diagnosis Date    Anxiety     Concussion     Margo-Danlos syndrome     Hypoglycemia     PCOS (polycystic ovarian syndrome)     POP (persistent occipitoposterior position)     POTS (postural orthostatic tachycardia syndrome)     PTSD (post-traumatic stress disorder)          Past Surgical History:   Procedure Laterality Date    APPENDECTOMY      SKIN BIOPSY         Exam: Patient ambulatory, slightly tachycardic, otherwise well-appearing      Impression/Plan/Medical decision making/ED course: 24-year-old presenting with above symptoms, concern for possibility of cellulitis, would also consider exacerbation of her pots disease, doubt more deep space infection or progression.  Patient subsequently evaluated with labs, did have a elevated white blood count, viral testing negative, discussed need for antibiotics, while waiting for the  antibiotics the patient became inpatient, subsequently left without her paperwork.     After my initial evaluation, the patient requires testing, treatment and/or serial reevaluations to rule in or out the need for admission or emergency surgery.   The patient The patient was discharged after careful consideration for any admission needs    Medications   sodium chloride 0.9 % bolus 1,000 mL (0 mL intravenous Stopped 8/17/24 0127)         ECG review: ECG read/interpreted by me: As Below   ECG 12 lead   Independent Interpretation by ED Provider   Normal sinus rhythm, no ST elevation or significant t wave changes          While here I have   Reviewed previous records in our system:  Reviewed prior outpatient and ER visits  Reviewed care everywhere for outside records: Reviewed prior outpatient and ER visits  Reviewed and interpreted lab results: agree with, independently interpreted, and reviewed below  Labs Reviewed   SARS-COV-2 (COVID-19)/ FLU A/B, AND RSV, PCR - Normal       Result Value    SARS-CoV-2 (COVID-19) Negative      Influenza A Negative      Influenza B Negative      Respiratory Syncytial Virus Negative      Narrative:     Testing performed using real-time PCR for detection of COVID-19. EUA approved validation studies performed on site.    COMPREHENSIVE METABOLIC PANEL - Normal    Sodium 138      Potassium 3.8      Chloride 105      CO2 26      BUN 13      Creatinine 0.7      Glucose 79      Calcium 9.1      AST (SGOT) 15      ALT (SGPT) 13      Alkaline Phosphatase 54      Total Protein 7.1      Albumin 4.2      Bilirubin, Total 0.3      eGFR >60.0      Anion Gap 7     BHCG, SERUM, QUAL - Normal    Preg Test, Serum Negative     CBC AND DIFF    WBC 7.15      RBC 4.56      Hemoglobin 14.4      Hematocrit 42.4      MCV 93.0      MCH 31.6      MCHC 34.0      RDW 12.3      Platelets 270      MPV 9.8      Differential Type Auto      nRBC 0.0      Immature Granulocytes 0.4      Neutrophils 65.1      Lymphocytes  27.7      Monocytes 5.9      Eosinophils 0.6      Basophils 0.3      Immature Granulocytes, Absolute 0.03      Neutrophils, Absolute 4.66      Lymphocytes, Absolute 1.98      Monocytes, Absolute 0.42      Eosinophils, Absolute 0.04      Basophils, Absolute 0.02     RAINBOW DRAW PANEL    Narrative:     The following orders were created for panel order [place] Draw Panel.  Procedure                               Abnormality         Status                     ---------                               -----------         ------                     RAINBOW RED[[id number]]                                      In process                 RAINBOW LT BLUE[[id number]]                                  In process                 RAINBOW LT GREEN[[id number]]                                 In process                   Please view results for these tests on the individual orders.   RAINBOW RED   RAINBOW LT BLUE   RAINBOW LT GREEN     Reviewed and interpreted xrays, CT, MRI, US:  agree with, independently interpreted, and reviewed below  X-RAY CHEST 2 VIEWS   Final Result   IMPRESSION: Normal study.            ECG 12 lead   ED Interpretation   Normal sinus rhythm, no ST elevation or significant t wave changes              Compared lab results to previous lab results: .  Compared image results to previous results: .  Called and spoke with a consultant or physician about this case: N/A    Vital Signs Review: Vital signs have been reviewed. The oxygen saturation is SpO2: SpO2: 100 %   which is Normal      I was physically present for the key/critical portions of the following procedures: none     Luis Fragoso MD  08/18/24 9232

## 2024-08-18 NOTE — TELEPHONE ENCOUNTER
Synopsis:  Patient's grandmother calling w/ question regarding her ED visit yesterday when she was discharged on abx. Call dropped. Patient called back and spoke w/ colleague.  Disposition:  No disposition on file.  Care Advice:  Care Advice Given       No Care Advice given for this encounter.          Orders Placed This Encounter:  No orders of the defined types were placed in this encounter.

## 2024-08-20 LAB
BACTERIA UR CULT: ABNORMAL
GRAM STN SPEC: ABNORMAL
GRAM STN SPEC: ABNORMAL
MICROORGANISM SPEC CULT: ABNORMAL
MICROORGANISM SPEC CULT: ABNORMAL

## 2024-08-20 RX ORDER — NITROFURANTOIN 25; 75 MG/1; MG/1
100 CAPSULE ORAL 2 TIMES DAILY
Qty: 10 CAPSULE | Refills: 0 | Status: SHIPPED | OUTPATIENT
Start: 2024-08-20 | End: 2024-08-25

## 2024-09-04 ENCOUNTER — OFFICE VISIT (OUTPATIENT)
Dept: FAMILY MEDICINE | Facility: CLINIC | Age: 24
End: 2024-09-04
Payer: COMMERCIAL

## 2024-09-04 VITALS
HEART RATE: 41 BPM | RESPIRATION RATE: 16 BRPM | HEIGHT: 62 IN | TEMPERATURE: 97.2 F | SYSTOLIC BLOOD PRESSURE: 104 MMHG | BODY MASS INDEX: 27.75 KG/M2 | WEIGHT: 150.8 LBS | OXYGEN SATURATION: 94 % | DIASTOLIC BLOOD PRESSURE: 64 MMHG

## 2024-09-04 DIAGNOSIS — S06.0XAA CONCUSSION WITH UNKNOWN LOSS OF CONSCIOUSNESS STATUS, INITIAL ENCOUNTER: Primary | ICD-10-CM

## 2024-09-04 DIAGNOSIS — Z87.440 RECENT URINARY TRACT INFECTION: ICD-10-CM

## 2024-09-04 DIAGNOSIS — L03.116 CELLULITIS OF LEFT FOOT: ICD-10-CM

## 2024-09-04 PROBLEM — R21 RASH: Status: RESOLVED | Noted: 2023-12-07 | Resolved: 2024-09-04

## 2024-09-04 PROBLEM — N92.6 LATE MENSTRUATION: Status: RESOLVED | Noted: 2023-12-07 | Resolved: 2024-09-04

## 2024-09-04 PROBLEM — R10.2 CHRONIC PELVIC PAIN IN FEMALE: Status: ACTIVE | Noted: 2024-09-04

## 2024-09-04 PROBLEM — J06.9 UPPER RESPIRATORY TRACT INFECTION: Status: RESOLVED | Noted: 2023-12-07 | Resolved: 2024-09-04

## 2024-09-04 PROBLEM — Z83.3 FAMILY HISTORY OF DIABETES MELLITUS (DM): Status: RESOLVED | Noted: 2021-08-06 | Resolved: 2024-09-04

## 2024-09-04 PROBLEM — G89.29 CHRONIC PELVIC PAIN IN FEMALE: Status: ACTIVE | Noted: 2024-09-04

## 2024-09-04 PROBLEM — Z86.39 HISTORY OF HYPOTHYROIDISM: Status: RESOLVED | Noted: 2021-08-06 | Resolved: 2024-09-04

## 2024-09-04 PROBLEM — Z62.810 HISTORY OF SEXUAL ABUSE IN CHILDHOOD: Status: RESOLVED | Noted: 2021-08-06 | Resolved: 2024-09-04

## 2024-09-04 PROBLEM — R53.83 OTHER FATIGUE: Status: RESOLVED | Noted: 2023-06-05 | Resolved: 2024-09-04

## 2024-09-04 PROBLEM — Z87.898 HISTORY OF SEIZURE: Status: RESOLVED | Noted: 2021-08-06 | Resolved: 2024-09-04

## 2024-09-04 PROBLEM — Z87.42 HISTORY OF OVARIAN CYST: Status: RESOLVED | Noted: 2021-08-06 | Resolved: 2024-09-04

## 2024-09-04 PROBLEM — E16.2 HYPOGLYCEMIA: Status: RESOLVED | Noted: 2021-08-06 | Resolved: 2024-09-04

## 2024-09-04 PROBLEM — R55 SYNCOPE AND COLLAPSE: Status: RESOLVED | Noted: 2022-11-29 | Resolved: 2024-09-04

## 2024-09-04 PROBLEM — Z81.8 FAMILY HISTORY OF BIPOLAR DISORDER: Status: RESOLVED | Noted: 2021-08-06 | Resolved: 2024-09-04

## 2024-09-04 LAB
BILIRUBIN, POC: NEGATIVE
BLOOD URINE, POC: POSITIVE
CLARITY, POC: CLEAR
COLOR, POC: YELLOW
EXPIRATION DATE: ABNORMAL
GLUCOSE URINE, POC: NEGATIVE
KETONES, POC: NEGATIVE
LEUKOCYTE EST, POC: ABNORMAL
Lab: ABNORMAL
NITRITE, POC: NEGATIVE
PH, POC: 5
POCT MANUFACTURER: ABNORMAL
PROTEIN, POC: ABNORMAL
SPECIFIC GRAVITY, POC: 1
UROBILINOGEN, POC: 0.2

## 2024-09-04 PROCEDURE — 3008F BODY MASS INDEX DOCD: CPT | Performed by: NURSE PRACTITIONER

## 2024-09-04 PROCEDURE — 81002 URINALYSIS NONAUTO W/O SCOPE: CPT | Performed by: NURSE PRACTITIONER

## 2024-09-04 PROCEDURE — 99215 OFFICE O/P EST HI 40 MIN: CPT | Performed by: NURSE PRACTITIONER

## 2024-09-04 ASSESSMENT — ENCOUNTER SYMPTOMS
DECREASED CONCENTRATION: 0
SHORTNESS OF BREATH: 0
WOUND: 0
PHOTOPHOBIA: 1
CONFUSION: 0
VOMITING: 0
EYE PAIN: 0
BACK PAIN: 1
FATIGUE: 1
DIFFICULTY URINATING: 0
ABDOMINAL PAIN: 1
BLOOD IN STOOL: 0
HEADACHES: 1
FEVER: 1

## 2024-09-04 NOTE — ASSESSMENT & PLAN NOTE
Continue brain rest.  I gave her a work note to allow light duty / more frequent breaks for the next 2 weeks.  She's established with neurology, Dr. Cook, if needs further workup.

## 2024-09-04 NOTE — LETTER
September 4, 2024     Patient: Kyaw Harrison  YOB: 2000  Date of Visit: 9/4/2024    To Whom it May Concern:    Kyaw Harrison was seen in my clinic on 9/4/2024 at 2:00 pm. Please excuse Kyaw for her absence from work on this day to make the appointment.    She is allowed to work half days for the next 2 weeks if needed given her recent concussion.  Light duty recommended (ie: limit screen time, computer work, bright lights, loud noises) when her symptoms worsen.    If you have any questions or concerns, please don't hesitate to call.         Sincerely,         GLORIA Mckeon

## 2024-09-04 NOTE — ASSESSMENT & PLAN NOTE
Dipstick +blood +leuks +protein.  Recent menses.  I prefer to repeat UC to see if her recent UTI has resolved prior to starting more antibiotics.  She's agreeable to this plan.  Keep f/u with GYN for chronic lower abd pain.
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
## 2024-09-06 LAB
BACTERIA UR CULT: NORMAL
BACTERIA UR CULT: NORMAL

## 2024-09-09 ENCOUNTER — TRANSCRIBE ORDERS (OUTPATIENT)
Dept: SCHEDULING | Age: 24
End: 2024-09-09

## 2024-09-09 DIAGNOSIS — R19.09 OTHER INTRA-ABDOMINAL AND PELVIC SWELLING, MASS AND LUMP: Primary | ICD-10-CM

## 2024-10-01 ENCOUNTER — HOSPITAL ENCOUNTER (OUTPATIENT)
Dept: RADIOLOGY | Age: 24
Discharge: HOME | End: 2024-10-01
Attending: OBSTETRICS & GYNECOLOGY
Payer: COMMERCIAL

## 2024-10-01 DIAGNOSIS — R19.09 OTHER INTRA-ABDOMINAL AND PELVIC SWELLING, MASS AND LUMP: ICD-10-CM

## 2024-10-01 RX ORDER — GADOBUTROL 604.72 MG/ML
0.1 INJECTION INTRAVENOUS ONCE
Status: COMPLETED | OUTPATIENT
Start: 2024-10-01 | End: 2024-10-01

## 2024-10-01 RX ADMIN — GADOBUTROL 6.6 ML: 604.72 INJECTION INTRAVENOUS at 13:27

## 2024-10-16 ENCOUNTER — OFFICE VISIT (OUTPATIENT)
Dept: FAMILY MEDICINE | Facility: CLINIC | Age: 24
End: 2024-10-16
Payer: COMMERCIAL

## 2024-10-16 VITALS
TEMPERATURE: 97 F | WEIGHT: 155 LBS | RESPIRATION RATE: 16 BRPM | BODY MASS INDEX: 26.46 KG/M2 | DIASTOLIC BLOOD PRESSURE: 68 MMHG | OXYGEN SATURATION: 96 % | HEIGHT: 64 IN | SYSTOLIC BLOOD PRESSURE: 106 MMHG | HEART RATE: 98 BPM

## 2024-10-16 DIAGNOSIS — L08.9 RECURRENT INFECTION OF SKIN: Primary | ICD-10-CM

## 2024-10-16 PROCEDURE — 3008F BODY MASS INDEX DOCD: CPT | Performed by: FAMILY MEDICINE

## 2024-10-16 PROCEDURE — 99213 OFFICE O/P EST LOW 20 MIN: CPT | Performed by: FAMILY MEDICINE

## 2024-10-16 NOTE — PROGRESS NOTES
"        Consent obtained from patient and all parties present in the room? yes    I have obtained the consent of everyone present in the room to make an audio recording of this visit to assist me in documenting the encounter in the EMR.     Subjective     Patient ID: Kyaw Harrison, : 2000 is a 24 y.o. female who presents for Rash    History of Present Illness  The patient is a 24-year-old female here for an acute concern. She is accompanied by her mother.    She has been experiencing intermittent rashes, which she suspects may be due to a Staph infection. These rashes, which start with a sore throat followed by small bumps, have not responded to Neosporin. The rashes have since spread, with a significant one on the back of her leg. They cause itchiness, pain, and a burning sensation. She also reports a headache and fatigue. Her mother notes that she has had staph infections twice a year, which were previously treated by a pediatrician at [facility]. She was previously prescribed antibiotics for her sore throat and fatigue.    The following have been reviewed and updated as appropriate in this visit:   Allergies  Meds  Problems         Objective   Vitals:    10/16/24 0910   BP: 106/68   BP Location: Right upper arm   Patient Position: Sitting   Pulse: 98   Resp: 16   Temp: 36.1 °C (97 °F)   TempSrc: Temporal   SpO2: 96%   Weight: 70.3 kg (155 lb)   Height: 1.626 m (5' 4\")     Body mass index is 26.61 kg/m².    Physical Exam    Physical Exam  Vitals and nursing note reviewed.   Constitutional:       General: She is not in acute distress.     Appearance: Normal appearance. She is not ill-appearing.   Cardiovascular:      Rate and Rhythm: Normal rate and regular rhythm.      Heart sounds: No murmur heard.     No gallop.   Pulmonary:      Effort: Pulmonary effort is normal. No respiratory distress.      Breath sounds: Normal breath sounds. No wheezing, rhonchi or rales.   Musculoskeletal:      Cervical back: Neck " supple.   Skin:     General: Skin is warm and dry.      Findings: Rash present. Rash is papular.          Neurological:      Mental Status: She is alert and oriented to person, place, and time.   Psychiatric:         Behavior: Behavior normal.         Results         Assessment & Plan  1. Rash.  Rash of unknown origin. Recurrent every year. The possibility of an early onset shingles was discussed. A referral to an infectious disease specialist will be made.         Problem List Items Addressed This Visit    None  Visit Diagnoses       Recurrent infection of skin    -  Primary    Relevant Orders    Ambulatory referral to Infectious Disease               I spent 20 minutes on this date of service performing the following activities: obtaining history, performing examination, entering orders, documenting, preparing for visit, obtaining / reviewing records, and providing counseling and education.

## 2024-10-17 ENCOUNTER — OFFICE VISIT (OUTPATIENT)
Dept: FAMILY MEDICINE | Facility: CLINIC | Age: 24
End: 2024-10-17
Payer: COMMERCIAL

## 2024-10-17 VITALS
RESPIRATION RATE: 16 BRPM | DIASTOLIC BLOOD PRESSURE: 72 MMHG | HEART RATE: 64 BPM | BODY MASS INDEX: 29.63 KG/M2 | WEIGHT: 161 LBS | HEIGHT: 62 IN | SYSTOLIC BLOOD PRESSURE: 124 MMHG | OXYGEN SATURATION: 98 % | TEMPERATURE: 97.5 F

## 2024-10-17 DIAGNOSIS — L30.9 DERMATITIS: ICD-10-CM

## 2024-10-17 DIAGNOSIS — R21 RASH: Primary | ICD-10-CM

## 2024-10-17 PROCEDURE — 3008F BODY MASS INDEX DOCD: CPT | Performed by: FAMILY MEDICINE

## 2024-10-17 PROCEDURE — 99213 OFFICE O/P EST LOW 20 MIN: CPT | Performed by: FAMILY MEDICINE

## 2024-10-17 RX ORDER — PREDNISONE 10 MG/1
TABLET ORAL
Qty: 21 TABLET | Refills: 0 | Status: SHIPPED | OUTPATIENT
Start: 2024-10-17 | End: 2025-02-05

## 2024-10-17 NOTE — PROGRESS NOTES
"Subjective      Patient ID: Kyaw Harrison is a 24 y.o. female.  2000      Patient reports office with complaint of skin lesions.  Patient reports to office with mother.    Patient has a history of Margo-Danlos syndrome, POTS, with recurrent episodes of dermatitis that have been diagnosed as cellulitis or dermatitis.  Patient seems to get similar rash about the same time each year.      This episode occurred a few days ago.  Patient noticed that she had small bumps that were itchy and mildly painful on her bilateral inner thighs.  Patient then also noticed she had similar lesions that were symmetric on bilateral intergluteal area.  Denies any fluctuance or discharge.  Patient has attempted to see a dermatologist about these rashes but has not yet been able to get in.    Patient use topical steroid cream without very much benefit.        The following have been reviewed and updated as appropriate in this visit:   Tobacco  Allergies  Meds  Problems  Med Hx  Surg Hx  Fam Hx       Review of Systems   All other systems reviewed and are negative.      Objective     Vitals:    10/17/24 1255   BP: 124/72   BP Location: Right upper arm   Patient Position: Sitting   Pulse: 64   Resp: 16   Temp: 36.4 °C (97.5 °F)   TempSrc: Temporal   SpO2: 98%   Weight: 73 kg (161 lb)   Height: 1.575 m (5' 2\")     Body mass index is 29.45 kg/m².    Physical Exam  Vitals and nursing note reviewed. Chaperone present: Patient's mother room for exam.  Declined additional chaperone..   Constitutional:       Appearance: Normal appearance. She is well-developed.   Cardiovascular:      Rate and Rhythm: Normal rate.   Pulmonary:      Effort: Pulmonary effort is normal.   Skin:     General: Skin is warm and dry.      Comments: Patient with small light pink macular symmetric areas of inner thighs and intergluteal areas of bilateral buttocks with minimal skin irritation.  No fluctuance noted.   Neurological:      Mental Status: She is alert. "         Assessment & Plan  Rash  Patient's rash appears to be in symmetric areas of her inner thigh intergluteal areas.  It does not appear to be fungal but does appear to be in areas that are in contact.  Patient has been using topical steroid.  This does not appear to be bacterial/infectious.  Does not appear to be fungal either at the this time.  Margo-Danlos patients are at increased risk for mast cell activation syndrome which in this case would be very mild.  We will attempt a short taper of prednisone as patient has had some success with this in the past.  Hold off on antibiotics at this time especially given patient's history of ESBL.  Orders:    Ambulatory referral to Immunology; Future    Dermatitis    Orders:    Ambulatory referral to Immunology; Future

## 2024-10-17 NOTE — ASSESSMENT & PLAN NOTE
Patient's rash appears to be in symmetric areas of her inner thigh intergluteal areas.  It does not appear to be fungal but does appear to be in areas that are in contact.  Patient has been using topical steroid.  This does not appear to be bacterial/infectious.  Does not appear to be fungal either at the this time.  Margo-Danlos patients are at increased risk for mast cell activation syndrome which in this case would be very mild.  We will attempt a short taper of prednisone as patient has had some success with this in the past.  Hold off on antibiotics at this time especially given patient's history of ESBL.  Orders:    Ambulatory referral to Immunology; Future

## 2024-10-21 ENCOUNTER — APPOINTMENT (EMERGENCY)
Dept: RADIOLOGY | Facility: HOSPITAL | Age: 24
End: 2024-10-21
Payer: COMMERCIAL

## 2024-10-21 ENCOUNTER — HOSPITAL ENCOUNTER (EMERGENCY)
Facility: HOSPITAL | Age: 24
Discharge: HOME | End: 2024-10-22
Attending: EMERGENCY MEDICINE | Admitting: EMERGENCY MEDICINE
Payer: COMMERCIAL

## 2024-10-21 VITALS
HEART RATE: 96 BPM | SYSTOLIC BLOOD PRESSURE: 118 MMHG | HEIGHT: 64 IN | OXYGEN SATURATION: 100 % | WEIGHT: 155 LBS | BODY MASS INDEX: 26.46 KG/M2 | TEMPERATURE: 97.8 F | DIASTOLIC BLOOD PRESSURE: 73 MMHG | RESPIRATION RATE: 18 BRPM

## 2024-10-21 DIAGNOSIS — R11.0 NAUSEA: ICD-10-CM

## 2024-10-21 DIAGNOSIS — R10.32 LEFT LOWER QUADRANT ABDOMINAL PAIN: ICD-10-CM

## 2024-10-21 DIAGNOSIS — R10.2 PELVIC PAIN: Primary | ICD-10-CM

## 2024-10-21 DIAGNOSIS — R35.0 URINARY FREQUENCY: ICD-10-CM

## 2024-10-21 DIAGNOSIS — R19.7 DIARRHEA, UNSPECIFIED TYPE: ICD-10-CM

## 2024-10-21 LAB
ALBUMIN SERPL-MCNC: 4.5 G/DL (ref 3.5–5.7)
ALP SERPL-CCNC: 56 IU/L (ref 34–125)
ALT SERPL-CCNC: 16 IU/L (ref 7–52)
ANION GAP SERPL CALC-SCNC: 8 MEQ/L (ref 3–15)
AST SERPL-CCNC: 17 IU/L (ref 13–39)
BASOPHILS # BLD: 0.03 K/UL (ref 0.01–0.1)
BASOPHILS NFR BLD: 0.2 %
BILIRUB SERPL-MCNC: 0.3 MG/DL (ref 0.3–1.2)
BILIRUB UR QL STRIP.AUTO: NEGATIVE MG/DL
BUN SERPL-MCNC: 13 MG/DL (ref 7–25)
CALCIUM SERPL-MCNC: 10.1 MG/DL (ref 8.6–10.3)
CHLORIDE SERPL-SCNC: 101 MEQ/L (ref 98–107)
CLARITY UR REFRACT.AUTO: CLEAR
CO2 SERPL-SCNC: 28 MEQ/L (ref 21–31)
COLOR UR AUTO: YELLOW
CREAT SERPL-MCNC: 0.7 MG/DL (ref 0.6–1.2)
DIFFERENTIAL METHOD BLD: ABNORMAL
EGFRCR SERPLBLD CKD-EPI 2021: >60 ML/MIN/1.73M*2
EOSINOPHIL # BLD: 0.01 K/UL (ref 0.04–0.36)
EOSINOPHIL NFR BLD: 0.1 %
ERYTHROCYTE [DISTWIDTH] IN BLOOD BY AUTOMATED COUNT: 12.3 % (ref 11.7–14.4)
GLUCOSE SERPL-MCNC: 111 MG/DL (ref 70–99)
GLUCOSE UR STRIP.AUTO-MCNC: NEGATIVE MG/DL
HCG UR QL: NEGATIVE
HCT VFR BLD AUTO: 41.5 % (ref 35–45)
HGB BLD-MCNC: 14.1 G/DL (ref 11.8–15.7)
HGB UR QL STRIP.AUTO: NEGATIVE
IMM GRANULOCYTES # BLD AUTO: 0.08 K/UL (ref 0–0.08)
IMM GRANULOCYTES NFR BLD AUTO: 0.6 %
KETONES UR STRIP.AUTO-MCNC: NEGATIVE MG/DL
LEUKOCYTE ESTERASE UR QL STRIP.AUTO: NEGATIVE
LYMPHOCYTES # BLD: 1.28 K/UL (ref 1.2–3.5)
LYMPHOCYTES NFR BLD: 9.1 %
MCH RBC QN AUTO: 31.1 PG (ref 28–33.2)
MCHC RBC AUTO-ENTMCNC: 34 G/DL (ref 32.2–35.5)
MCV RBC AUTO: 91.6 FL (ref 83–98)
MONOCYTES # BLD: 0.55 K/UL (ref 0.28–0.8)
MONOCYTES NFR BLD: 3.9 %
NEUTROPHILS # BLD: 12.14 K/UL (ref 1.7–7)
NEUTS SEG NFR BLD: 86.1 %
NITRITE UR QL STRIP.AUTO: NEGATIVE
NRBC BLD-RTO: 0 %
PH UR STRIP.AUTO: 7.5 [PH]
PLATELET # BLD AUTO: 312 K/UL (ref 150–369)
PMV BLD AUTO: 9.6 FL (ref 9.4–12.3)
POTASSIUM SERPL-SCNC: 4 MEQ/L (ref 3.5–5.1)
PROT SERPL-MCNC: 7.6 G/DL (ref 6–8.2)
PROT UR QL STRIP.AUTO: NEGATIVE
RBC # BLD AUTO: 4.53 M/UL (ref 3.93–5.22)
SODIUM SERPL-SCNC: 137 MEQ/L (ref 136–145)
SP GR UR REFRACT.AUTO: 1.01
UROBILINOGEN UR STRIP-ACNC: 0.2 EU/DL
WBC # BLD AUTO: 14.09 K/UL (ref 3.8–10.5)

## 2024-10-21 PROCEDURE — 80053 COMPREHEN METABOLIC PANEL: CPT | Performed by: EMERGENCY MEDICINE

## 2024-10-21 PROCEDURE — 76830 TRANSVAGINAL US NON-OB: CPT

## 2024-10-21 PROCEDURE — 99284 EMERGENCY DEPT VISIT MOD MDM: CPT | Mod: U5,25

## 2024-10-21 PROCEDURE — 93975 VASCULAR STUDY: CPT

## 2024-10-21 PROCEDURE — 81003 URINALYSIS AUTO W/O SCOPE: CPT

## 2024-10-21 PROCEDURE — 3E033GC INTRODUCTION OF OTHER THERAPEUTIC SUBSTANCE INTO PERIPHERAL VEIN, PERCUTANEOUS APPROACH: ICD-10-PCS | Performed by: EMERGENCY MEDICINE

## 2024-10-21 PROCEDURE — 76857 US EXAM PELVIC LIMITED: CPT

## 2024-10-21 PROCEDURE — 85025 COMPLETE CBC W/AUTO DIFF WBC: CPT

## 2024-10-21 PROCEDURE — 63600000 HC DRUGS/DETAIL CODE: Mod: JZ | Performed by: EMERGENCY MEDICINE

## 2024-10-21 PROCEDURE — 3E0333Z INTRODUCTION OF ANTI-INFLAMMATORY INTO PERIPHERAL VEIN, PERCUTANEOUS APPROACH: ICD-10-PCS | Performed by: EMERGENCY MEDICINE

## 2024-10-21 PROCEDURE — 80053 COMPREHEN METABOLIC PANEL: CPT

## 2024-10-21 PROCEDURE — 85025 COMPLETE CBC W/AUTO DIFF WBC: CPT | Performed by: EMERGENCY MEDICINE

## 2024-10-21 PROCEDURE — 96374 THER/PROPH/DIAG INJ IV PUSH: CPT

## 2024-10-21 PROCEDURE — 84703 CHORIONIC GONADOTROPIN ASSAY: CPT

## 2024-10-21 PROCEDURE — 84703 CHORIONIC GONADOTROPIN ASSAY: CPT | Performed by: EMERGENCY MEDICINE

## 2024-10-21 PROCEDURE — 96375 TX/PRO/DX INJ NEW DRUG ADDON: CPT

## 2024-10-21 PROCEDURE — 36415 COLL VENOUS BLD VENIPUNCTURE: CPT

## 2024-10-21 PROCEDURE — 74176 CT ABD & PELVIS W/O CONTRAST: CPT

## 2024-10-21 RX ORDER — KETOROLAC TROMETHAMINE 15 MG/ML
15 INJECTION, SOLUTION INTRAMUSCULAR; INTRAVENOUS ONCE
Status: COMPLETED | OUTPATIENT
Start: 2024-10-21 | End: 2024-10-21

## 2024-10-21 RX ORDER — ONDANSETRON HYDROCHLORIDE 2 MG/ML
4 INJECTION, SOLUTION INTRAVENOUS ONCE
Status: COMPLETED | OUTPATIENT
Start: 2024-10-21 | End: 2024-10-21

## 2024-10-21 RX ADMIN — KETOROLAC TROMETHAMINE 15 MG: 15 INJECTION, SOLUTION INTRAMUSCULAR; INTRAVENOUS at 20:42

## 2024-10-21 RX ADMIN — ONDANSETRON 4 MG: 2 INJECTION INTRAMUSCULAR; INTRAVENOUS at 20:41

## 2024-10-21 ASSESSMENT — ENCOUNTER SYMPTOMS
DYSURIA: 0
CONSTIPATION: 0
VOMITING: 0
FREQUENCY: 1
DIAPHORESIS: 0
DIZZINESS: 0
FEVER: 0
DIFFICULTY URINATING: 0
CHILLS: 0
PALPITATIONS: 0
HEADACHES: 0
HEMATURIA: 0
ABDOMINAL DISTENTION: 0
ABDOMINAL PAIN: 1
WEAKNESS: 0
NUMBNESS: 0
NAUSEA: 1
COUGH: 0
DIARRHEA: 1
BACK PAIN: 0
TROUBLE SWALLOWING: 0
WHEEZING: 0
CONFUSION: 0
LIGHT-HEADEDNESS: 0
SEIZURES: 0
FATIGUE: 0
CHEST TIGHTNESS: 0
FLANK PAIN: 1
VOICE CHANGE: 0
SHORTNESS OF BREATH: 0
EYE PAIN: 0
ARTHRALGIAS: 0
SORE THROAT: 0
COLOR CHANGE: 0
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
## 2024-10-22 NOTE — ED ATTESTATION NOTE
I reviewed and agree with physician assistant / nurse practitioner’s assessment and plan of care.  We discussed the treatment plan for the patient.  I was immediately available for any questions regarding the care of the patient.     Jemma Raygoza MD  10/21/24 9532

## 2024-10-22 NOTE — DISCHARGE INSTRUCTIONS
You have been evaluated in the emergency department today for abdominal and pelvic pain.  Your evaluation did not show evidence of medical conditions requiring emergent intervention at this time.  Please schedule an appointment with your primary care physician and OB/GYN within 2 to 3 days for reevaluation.  Return to the emergency department if you experience worsening or uncontrolled pain, fevers 100.4 or greater, recurrent vomiting, inability to tolerate food or fluids by mouth, bloody stools or vomit, dark or tarry stools, or any other concerning symptoms.

## 2024-11-13 ENCOUNTER — TRANSCRIBE ORDERS (OUTPATIENT)
Dept: SCHEDULING | Age: 24
End: 2024-11-13

## 2024-11-13 DIAGNOSIS — N60.01 SOLITARY CYST OF RIGHT BREAST: Primary | ICD-10-CM

## 2024-11-19 ENCOUNTER — HOSPITAL ENCOUNTER (OUTPATIENT)
Dept: RADIOLOGY | Age: 24
Discharge: HOME | End: 2024-11-19
Attending: OBSTETRICS & GYNECOLOGY
Payer: COMMERCIAL

## 2024-11-19 DIAGNOSIS — N60.01 SOLITARY CYST OF RIGHT BREAST: ICD-10-CM

## 2024-11-19 PROCEDURE — 76642 ULTRASOUND BREAST LIMITED: CPT | Mod: RT

## 2025-02-03 ENCOUNTER — TELEPHONE (OUTPATIENT)
Dept: FAMILY MEDICINE | Facility: CLINIC | Age: 25
End: 2025-02-03

## 2025-02-03 NOTE — TELEPHONE ENCOUNTER
[facility] Appointment Request   Provider:   Appointment Type: TB shot   Reason for Visit: TB for work  Available Day and Time: As soon as possible after 11am.  Best Contact Number: [phone]    The practice will reach out to schedule your appointment within the next 2 business days.

## 2025-02-04 ENCOUNTER — TELEPHONE (OUTPATIENT)
Dept: FAMILY MEDICINE | Facility: CLINIC | Age: 25
End: 2025-02-04

## 2025-02-04 ENCOUNTER — NURSE TRIAGE (OUTPATIENT)
Dept: FAMILY MEDICINE | Facility: CLINIC | Age: 25
End: 2025-02-04
Payer: COMMERCIAL

## 2025-02-04 NOTE — TELEPHONE ENCOUNTER
Test Order Request   Patient PCP: Phu Vega, DO  Next Office Visit: Visit date not found  Preferred Lab: [id number]  [address] [facility] [facility] [id number]  Tests Requested: TB test for employment  , MyChart, or US Mail?: n/a    The practice will reach out to discuss your request within 2 business days.

## 2025-02-04 NOTE — TELEPHONE ENCOUNTER
Regarding: Red Flag Severe knee pain/seizure  ----- Message from Quita PENA sent at 2/4/2025 12:56 PM EST -----  .Patient called in today for red flag complaint of.severe pain and swelling in knees also had seizure recently Call transferred to the Primary Care Nurse Triage Line.

## 2025-02-04 NOTE — TELEPHONE ENCOUNTER
"Patient transferred to the Primary Care Telephonic Nurse Triage Line with complaints of pain/swelling of bilateral knees.     HPI:  Patient verbalized that her knee pain/swelling started about a month ago. She states \"I think I fried my nervous system.\" When asked for further explanation, patient verbalized that she has been traveling in the car with her boyfriend recently to different states. She spent a long time standing/walking about a week ago at a game. The next day she found a tiny black tick on her buttock. She had a slight fever and had a passed out/had a seizure (she hasn't had one in years). Her boyfriend took her for an evaluation while in [place]. Patient forgot to mention the tick bite at that visit.    PMH: POTS, migraines, Margo-Danlos syndrome.  She gets numbness/tingling in her legs more above her baseline if she sits \"wrong\" for too long it lasts for up to a minute. She verbalizes that her knees \"look different\" but denies redness/discoloration. Today, left knee is swollen today. She states the \"Top of my knee is squishy\" \"maybe half a golf ball above my knee\".   Patient also verbalizes having fatigue and headaches for the past week as well. She said that her headache is worse than it has been before. Currently sees neuro for migraines and is prescribed Aimovig.   Patient declined ER and UC. Reviewed ER precautions for severe/worsening symptoms. Patient verbalized understanding.   Patient requesting appt with PCP office. No appts in office today or via telemed.     Disposition:  See Within 3 Days in Office  Appt scheduled with GLORIA tomorrow at 10:30am.   Care Advice:  Care Advice Given       Given By Given At Modified    Liya Linda 2/4/2025  2:14 PM Yes           Disposition and First Aid    SEE IN OFFICE WITHIN 3 DAYS:   * You need to be examined.   * Appt made with GLORIA tomorrow at 10:30am.     Liya Linda 2/4/2025  2:14 PM No           Knee Pain - General Care Advice    REASSURANCE AND " EDUCATION - KNEE PAIN:  * The symptoms you describe do not sound serious. You have said that there is no redness, swelling, or fever. You have said that there has been no recent major injury.  * Knee pain can be caused by arthritis, joint irritation, and strained muscles.  * Here is some care advice that should help.    Liya Linda 2/4/2025  2:14 PM No           Knee Pain - General Care Advice    PAIN MEDICINES:  * For pain relief, you can take either acetaminophen, ibuprofen, or naproxen.  * They are over-the-counter (OTC) pain drugs. You can buy them at the drugstore.  * ACETAMINOPHEN - REGULAR STRENGTH TYLENOL: Take 650 mg (two 325 mg pills) by mouth every 4 to 6 hours as needed. Each Regular Strength Tylenol pill has 325 mg of acetaminophen. The most you should take is 10 pills a day (3,250 mg total). Note: In [place], the maximum is 12 pills a day (3,900 mg total).  * ACETAMINOPHEN - EXTRA STRENGTH TYLENOL: Take 1,000 mg (two 500 mg pills) every 6 to 8 hours as needed. Each Extra Strength Tylenol pill has 500 mg of acetaminophen. The most you should take is 6 pills a day (3,000 mg total). Note: In [place], the maximum is 8 pills a day (4,000 mg total).  * IBUPROFEN (SUCH AS MOTRIN, ADVIL): Take 400 mg (two 200 mg pills) by mouth every 6 hours. The most you should take is 6 pills a day (1,200 mg total).  * NAPROXEN (SUCH AS ALEVE): Take 220 mg (one 220 mg pill) by mouth every 8 to 12 hours as needed. You may take 440 mg (two 220 mg pills) for your first dose. The most you should take is 3 pills a day (660 mg total). Note: In [place], the maximum is 2 pills a day (one every 12 hours; 440 mg total).  * Use the lowest amount of medicine that makes your pain better.    Liya Linda 2/4/2025  2:14 PM No           Knee Pain - General Care Advice    USING HEAT FOR PAIN:  * Using heat can sometimes help decrease pain.  * Use a heat pack, heating pad, or warm wet washcloth.  * Do this for 10 minutes three times a  "day.  * This will help increase blood flow and improve healing.  * Caution: Avoid burn. Do not sleep on a heating pad.    Liya Linda 2/4/2025  2:14 PM Yes           Knee Pain From Overuse    GO TO ER IF:  * Pain is severe/excruciating  * You have chest pain, difficulty breathing, redness/warmth to knee, increased swelling, loss of sensation, fever, visual changes, loss of consciousness, worsening headache  * You become worse           Reason for Disposition   MODERATE pain (e.g., symptoms interfere with work or school, limping) and present > 3 days   Swollen knee joint (no fever or redness)   Patient wants to be seen    Answer Assessment - Initial Assessment Questions  1. LOCATION and RADIATION: \"Where is the pain located?\"       Both knees  2. QUALITY: \"What does the pain feel like?\"  (e.g., sharp, dull, aching, burning)      Patient verbalizes that pain aching most of the time.  Feels like bones rubbing together at its worst.   Sitting makes them feel aching.      She also verbalized that it feels like there is something hard in her right knee that she able to move around.  3. SEVERITY: \"How bad is the pain?\" \"What does it keep you from doing?\"   (Scale 1-10; or mild, moderate, severe)      Pain rated at a 4 now. Keeps her from going to the gym.  4. ONSET: \"When did the pain start?\" \"Does it come and go, or is it there all the time?\"      About a month ago  Pain is constant-sometimes pain and swelling is worse at the end of the day. Sometimes changes sides-sometimes occurs on both sides  5. RECURRENT: \"Have you had this pain before?\" If Yes, ask: \"When, and what happened then?\"      No this has not happened before.   6. SETTING: \"Has there been any recent work, exercise or other activity that involved that part of the body?\"       She was recently very active at a sports game- walking/standing- about a week ago in [place]  7. AGGRAVATING FACTORS: \"What makes the knee pain worse?\" (e.g., walking, climbing stairs, " "running)      Certain movements make it worse  8. ASSOCIATED SYMPTOMS: \"Is there any swelling or redness of the knee?\"      Swelling to both knees but mostly left knee today  Denies redness or warmth  9. OTHER SYMPTOMS: \"Do you have any other symptoms?\" (e.g., chest pain, difficulty breathing, fever, calf pain)      Had a \"slight fever\" around the same time she found the tick. None at this time.   Patient also verbalizes having fatigue and headaches for the past week as well. She said that her headache is worse than it has been before. Currently sees neuro for migraines and is prescribed Aimovig.   Denies chest pain, difficulty breathing, calf pain at this time.  10. PREGNANCY: \"Is there any chance you are pregnant?\" \"When was your last menstrual period?\"        No- LMP 31 days ago    Protocols used: Knee Pain-Adult-[place]    "

## 2025-02-05 ENCOUNTER — OFFICE VISIT (OUTPATIENT)
Dept: FAMILY MEDICINE | Facility: CLINIC | Age: 25
End: 2025-02-05
Payer: COMMERCIAL

## 2025-02-05 VITALS
HEIGHT: 64 IN | SYSTOLIC BLOOD PRESSURE: 120 MMHG | OXYGEN SATURATION: 98 % | WEIGHT: 168 LBS | TEMPERATURE: 97.8 F | HEART RATE: 88 BPM | DIASTOLIC BLOOD PRESSURE: 80 MMHG | RESPIRATION RATE: 16 BRPM | BODY MASS INDEX: 28.68 KG/M2

## 2025-02-05 DIAGNOSIS — M25.462 BILATERAL KNEE SWELLING: Primary | ICD-10-CM

## 2025-02-05 DIAGNOSIS — M25.461 BILATERAL KNEE SWELLING: Primary | ICD-10-CM

## 2025-02-05 DIAGNOSIS — Q79.60 EHLERS-DANLOS SYNDROME: ICD-10-CM

## 2025-02-05 DIAGNOSIS — L65.9 HAIR LOSS: ICD-10-CM

## 2025-02-05 DIAGNOSIS — Z11.1 TUBERCULOSIS SCREENING: ICD-10-CM

## 2025-02-05 DIAGNOSIS — R63.5 WEIGHT GAIN: ICD-10-CM

## 2025-02-05 PROBLEM — E28.2 PCOS (POLYCYSTIC OVARIAN SYNDROME): Status: RESOLVED | Noted: 2025-02-05 | Resolved: 2025-02-05

## 2025-02-05 PROBLEM — R21 RASH: Status: RESOLVED | Noted: 2023-12-07 | Resolved: 2025-02-05

## 2025-02-05 PROBLEM — Z87.440 RECENT URINARY TRACT INFECTION: Status: RESOLVED | Noted: 2024-09-04 | Resolved: 2025-02-05

## 2025-02-05 PROBLEM — L03.116 CELLULITIS OF LEFT FOOT: Status: RESOLVED | Noted: 2024-09-04 | Resolved: 2025-02-05

## 2025-02-05 PROBLEM — E28.2 PCOS (POLYCYSTIC OVARIAN SYNDROME): Status: ACTIVE | Noted: 2025-02-05

## 2025-02-05 PROCEDURE — 3008F BODY MASS INDEX DOCD: CPT | Performed by: NURSE PRACTITIONER

## 2025-02-05 PROCEDURE — 86580 TB INTRADERMAL TEST: CPT | Performed by: NURSE PRACTITIONER

## 2025-02-05 PROCEDURE — 99214 OFFICE O/P EST MOD 30 MIN: CPT | Mod: 25 | Performed by: NURSE PRACTITIONER

## 2025-02-05 RX ORDER — ESZOPICLONE 2 MG/1
TABLET, FILM COATED ORAL
COMMUNITY
Start: 2025-01-28

## 2025-02-05 ASSESSMENT — ENCOUNTER SYMPTOMS
ARTHRALGIAS: 1
UNEXPECTED WEIGHT CHANGE: 1
CHILLS: 0
JOINT SWELLING: 1
HEADACHES: 1
FEVER: 0

## 2025-02-05 NOTE — ASSESSMENT & PLAN NOTE
Checking extensive labs including inflammatory markers, Lyme, anemia studies.  Will refer to specialist if indicated.
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
## 2025-02-06 LAB
25(OH)D3+25(OH)D2 SERPL-MCNC: 32.3 NG/ML (ref 30–100)
ANA SER QL IF: NEGATIVE
B BURGDOR IGG+IGM SER QL IA: NEGATIVE
CRP SERPL-MCNC: 1 MG/L (ref 0–10)
ERYTHROCYTE [SEDIMENTATION RATE] IN BLOOD BY WESTERGREN METHOD: 10 MM/HR (ref 0–32)
FERRITIN SERPL-MCNC: 46 NG/ML (ref 15–150)
FOLATE SERPL-MCNC: 14.5 NG/ML
IRON SATN MFR SERPL: 26 % (ref 15–55)
IRON SERPL-MCNC: 90 UG/DL (ref 27–159)
RHEUMATOID FACT SERPL-ACNC: <10 IU/ML
T4 FREE SERPL-MCNC: 1.32 NG/DL (ref 0.82–1.77)
TIBC SERPL-MCNC: 349 UG/DL (ref 250–450)
TSH SERPL DL<=0.005 MIU/L-ACNC: 0.97 UIU/ML (ref 0.45–4.5)
UIBC SERPL-MCNC: 259 UG/DL (ref 131–425)
VIT B12 SERPL-MCNC: 938 PG/ML (ref 232–1245)

## 2025-02-07 ENCOUNTER — CLINICAL SUPPORT (OUTPATIENT)
Dept: FAMILY MEDICINE | Facility: CLINIC | Age: 25
End: 2025-02-07
Payer: COMMERCIAL

## 2025-02-07 LAB
INDURATION: 0 MM
TB SKIN TEST: NEGATIVE

## 2025-03-18 LAB — HM COLONOSCOPY: NORMAL

## 2025-03-19 ENCOUNTER — TRANSCRIBE ORDERS (OUTPATIENT)
Dept: SCHEDULING | Age: 25
End: 2025-03-19

## 2025-03-19 DIAGNOSIS — R20.2 PARESTHESIA OF SKIN: Primary | ICD-10-CM

## 2025-04-16 NOTE — PROGRESS NOTES
Verification of Patient Location:  The patient affirms they are currently located in the following state: {AMB Telemed Pt State:[id number]}    Request for Consent:    {[facility] Virtual Tele Aud/Video Temp:23674}    Patient Response to Request for Consent:  {[facility] Telemedicine Consent:[id number]}    I spent {[facility] Visit Time:2102345001} on this date of service performing the following activities: {Time based coding activities:46250}.     Subjective   Kyaw Harrison is a 24 y.o. female being called via telemedicine for No chief complaint on file.   ***        #    #  ?    Review of Systems      The following have been reviewed and updated as appropriate in this visit:        ?  ?  Objective         Assessment/Plan   Kyaw Harrison is a 24 y.o. female being called via telemedicine.  No diagnosis found.                No follow-ups on file.
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
## 2025-04-17 ENCOUNTER — OFFICE VISIT (OUTPATIENT)
Dept: FAMILY MEDICINE | Facility: CLINIC | Age: 25
End: 2025-04-17
Payer: COMMERCIAL

## 2025-04-17 VITALS
DIASTOLIC BLOOD PRESSURE: 70 MMHG | HEART RATE: 78 BPM | TEMPERATURE: 98.5 F | OXYGEN SATURATION: 98 % | WEIGHT: 162 LBS | RESPIRATION RATE: 16 BRPM | SYSTOLIC BLOOD PRESSURE: 120 MMHG | BODY MASS INDEX: 27.66 KG/M2 | HEIGHT: 64 IN

## 2025-04-17 DIAGNOSIS — S06.0XAS CONCUSSION WITH UNKNOWN LOSS OF CONSCIOUSNESS STATUS, SEQUELA (CMS/HCC): ICD-10-CM

## 2025-04-17 DIAGNOSIS — R21 RASH AND NONSPECIFIC SKIN ERUPTION: ICD-10-CM

## 2025-04-17 DIAGNOSIS — J06.9 VIRAL URI: Primary | ICD-10-CM

## 2025-04-17 LAB
EXPIRATION DATE: NORMAL
Lab: NORMAL
POCT MANUFACTURER: NORMAL
S PYO AG THROAT QL: NEGATIVE

## 2025-04-17 PROCEDURE — 3008F BODY MASS INDEX DOCD: CPT | Performed by: FAMILY MEDICINE

## 2025-04-17 PROCEDURE — 87880 STREP A ASSAY W/OPTIC: CPT | Performed by: FAMILY MEDICINE

## 2025-04-17 PROCEDURE — 99214 OFFICE O/P EST MOD 30 MIN: CPT | Performed by: FAMILY MEDICINE

## 2025-04-18 LAB
FLUAV RNA NPH QL NAA+PROBE: NOT DETECTED
FLUBV RNA NPH QL NAA+PROBE: NOT DETECTED
LC TEST INFORMATION:: NORMAL
RSV RNA NPH QL NAA+PROBE: NOT DETECTED
SARS-COV-2 RNA NPH QL NAA+PROBE: NOT DETECTED

## 2025-06-10 ENCOUNTER — OFFICE VISIT (OUTPATIENT)
Dept: FAMILY MEDICINE | Facility: CLINIC | Age: 25
End: 2025-06-10
Payer: COMMERCIAL

## 2025-06-10 VITALS
RESPIRATION RATE: 19 BRPM | TEMPERATURE: 98.4 F | DIASTOLIC BLOOD PRESSURE: 64 MMHG | HEIGHT: 64 IN | OXYGEN SATURATION: 99 % | HEART RATE: 64 BPM | WEIGHT: 163 LBS | SYSTOLIC BLOOD PRESSURE: 110 MMHG | BODY MASS INDEX: 27.83 KG/M2

## 2025-06-10 DIAGNOSIS — R55 VASOVAGAL SYNCOPE: ICD-10-CM

## 2025-06-10 DIAGNOSIS — A69.20 LYME DISEASE: Primary | ICD-10-CM

## 2025-06-10 DIAGNOSIS — R42 VERTIGO: ICD-10-CM

## 2025-06-10 PROCEDURE — 99215 OFFICE O/P EST HI 40 MIN: CPT | Performed by: NURSE PRACTITIONER

## 2025-06-10 PROCEDURE — 3008F BODY MASS INDEX DOCD: CPT | Performed by: NURSE PRACTITIONER

## 2025-06-10 NOTE — ASSESSMENT & PLAN NOTE
Recent ED workups benign.  Presume 2/2 POTS.  Continue hydrating with non-caffeinated fluids.  She may be interested in speaking with an autonomic specialist in the future.
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
## 2025-06-10 NOTE — ASSESSMENT & PLAN NOTE
Per ED titer that resulted today.  Start taking doxycycline as directed.  Reminded of photosensitivity s/e.   Detail Level: Zone Detail Level: Detailed Detail Level: Generalized

## 2025-06-11 ENCOUNTER — HOSPITAL ENCOUNTER (OUTPATIENT)
Dept: CARDIOLOGY | Facility: HOSPITAL | Age: 25
Discharge: HOME | End: 2025-06-11
Attending: PSYCHIATRY & NEUROLOGY
Payer: COMMERCIAL

## 2025-06-11 DIAGNOSIS — R20.2 PARESTHESIA OF SKIN: ICD-10-CM

## 2025-06-11 PROCEDURE — 95886 MUSC TEST DONE W/N TEST COMP: CPT

## 2025-06-27 ENCOUNTER — TELEPHONE (OUTPATIENT)
Dept: FAMILY MEDICINE | Facility: CLINIC | Age: 25
End: 2025-06-27

## 2025-06-27 NOTE — TELEPHONE ENCOUNTER
Request for Encounter, Labs, or Testing Results to be sent to Provider  Patient PCP: Phu Vega DO  Receiving Provider Name: Dr. Vazquez (Infectious control)  Receiving Provider Phone Number: [phone]  Receiving Provider Fax Number: [phone]  Encounter, Labs, or Testing Results?: Any documentation of skin issue that she was referred for.   Date of Service: Will not schedule an appt until they receive the records.    Phone: [phone]     The practice will send your records to the  provider as requested within 2 business days.

## 2025-06-30 RX ORDER — DOXYCYCLINE 100 MG/1
100 CAPSULE ORAL 2 TIMES DAILY
Qty: 28 CAPSULE | Refills: 0 | Status: SHIPPED | OUTPATIENT
Start: 2025-06-30 | End: 2025-07-14

## 2025-06-30 RX ORDER — DOXYCYCLINE 100 MG/1
CAPSULE ORAL
COMMUNITY
Start: 2025-06-10 | End: 2025-06-30 | Stop reason: SDUPTHER

## 2025-06-30 RX ORDER — FLUCONAZOLE 150 MG/1
150 TABLET ORAL DAILY
Qty: 2 TABLET | Refills: 0 | Status: SHIPPED | OUTPATIENT
Start: 2025-06-30